# Patient Record
Sex: MALE | Race: WHITE
[De-identification: names, ages, dates, MRNs, and addresses within clinical notes are randomized per-mention and may not be internally consistent; named-entity substitution may affect disease eponyms.]

---

## 2017-03-14 ENCOUNTER — HOSPITAL ENCOUNTER (INPATIENT)
Dept: HOSPITAL 61 - ER | Age: 67
LOS: 2 days | Discharge: HOME | DRG: 291 | End: 2017-03-16
Attending: FAMILY MEDICINE | Admitting: FAMILY MEDICINE
Payer: COMMERCIAL

## 2017-03-14 VITALS — SYSTOLIC BLOOD PRESSURE: 129 MMHG | DIASTOLIC BLOOD PRESSURE: 54 MMHG

## 2017-03-14 VITALS — HEIGHT: 68 IN | WEIGHT: 230 LBS | BODY MASS INDEX: 34.86 KG/M2

## 2017-03-14 DIAGNOSIS — J45.901: ICD-10-CM

## 2017-03-14 DIAGNOSIS — J96.01: ICD-10-CM

## 2017-03-14 DIAGNOSIS — I42.9: ICD-10-CM

## 2017-03-14 DIAGNOSIS — E78.00: ICD-10-CM

## 2017-03-14 DIAGNOSIS — I11.0: ICD-10-CM

## 2017-03-14 DIAGNOSIS — E78.5: ICD-10-CM

## 2017-03-14 DIAGNOSIS — Z79.4: ICD-10-CM

## 2017-03-14 DIAGNOSIS — E11.9: ICD-10-CM

## 2017-03-14 DIAGNOSIS — I50.21: Primary | ICD-10-CM

## 2017-03-14 DIAGNOSIS — J44.1: ICD-10-CM

## 2017-03-14 LAB
ALBUMIN SERPL-MCNC: 3.9 G/DL (ref 3.4–5)
ALBUMIN/GLOB SERPL: 0.9 {RATIO} (ref 1–1.7)
ALP SERPL-CCNC: 89 U/L (ref 46–116)
ALT SERPL-CCNC: 56 U/L (ref 16–63)
ANION GAP SERPL CALC-SCNC: 7 MMOL/L (ref 6–14)
AST SERPL-CCNC: 38 U/L (ref 15–37)
BASOPHILS # BLD AUTO: 0 X10^3/UL (ref 0–0.2)
BASOPHILS NFR BLD: 0 % (ref 0–3)
BILIRUB SERPL-MCNC: 0.5 MG/DL (ref 0.2–1)
BUN SERPL-MCNC: 19 MG/DL (ref 8–26)
BUN/CREAT SERPL: 16 (ref 6–20)
CALCIUM SERPL-MCNC: 8.9 MG/DL (ref 8.5–10.1)
CHLORIDE SERPL-SCNC: 106 MMOL/L (ref 98–107)
CO2 SERPL-SCNC: 32 MMOL/L (ref 21–32)
CREAT SERPL-MCNC: 1.2 MG/DL (ref 0.7–1.3)
EOSINOPHIL NFR BLD: 3 % (ref 0–3)
ERYTHROCYTE [DISTWIDTH] IN BLOOD BY AUTOMATED COUNT: 15.3 % (ref 11.5–14.5)
GFR SERPLBLD BASED ON 1.73 SQ M-ARVRAT: 60.6 ML/MIN
GLOBULIN SER-MCNC: 4.5 G/DL (ref 2.2–3.8)
GLUCOSE SERPL-MCNC: 142 MG/DL (ref 70–99)
HCO3 BLDA-SCNC: 27 MMOL/L (ref 21–28)
HCT VFR BLD CALC: 49.5 % (ref 39–53)
HGB BLD-MCNC: 15.6 G/DL (ref 13–17.5)
INSPIRATION SETTING TIME VENT: 40
LYMPHOCYTES # BLD: 2.3 X10^3/UL (ref 1–4.8)
LYMPHOCYTES NFR BLD AUTO: 19 % (ref 24–48)
MCH RBC QN AUTO: 26 PG (ref 25–35)
MCHC RBC AUTO-ENTMCNC: 32 G/DL (ref 31–37)
MCV RBC AUTO: 84 FL (ref 79–100)
MONOCYTES NFR BLD: 8 % (ref 0–9)
NEUTROPHILS NFR BLD AUTO: 69 % (ref 31–73)
OBC FLU: (no result)
PCO2 BLDA: 48 MMHG (ref 35–46)
PH ABG: 7.36 (ref 7.35–7.45)
PLATELET # BLD AUTO: 218 X10^3/UL (ref 140–400)
PO2 BLDA: 135 MMHG (ref 65–108)
POTASSIUM SERPL-SCNC: 4.8 MMOL/L (ref 3.5–5.1)
PROT SERPL-MCNC: 8.4 G/DL (ref 6.4–8.2)
RBC # BLD AUTO: 5.91 X10^6/UL (ref 4.3–5.7)
SAO2 % BLDA: 98 % (ref 92–99)
SODIUM SERPL-SCNC: 145 MMOL/L (ref 136–145)
WBC # BLD AUTO: 12 X10^3/UL (ref 4–11)

## 2017-03-14 PROCEDURE — 80053 COMPREHEN METABOLIC PANEL: CPT

## 2017-03-14 PROCEDURE — 85007 BL SMEAR W/DIFF WBC COUNT: CPT

## 2017-03-14 PROCEDURE — 84484 ASSAY OF TROPONIN QUANT: CPT

## 2017-03-14 PROCEDURE — 96375 TX/PRO/DX INJ NEW DRUG ADDON: CPT

## 2017-03-14 PROCEDURE — 94660 CPAP INITIATION&MGMT: CPT

## 2017-03-14 PROCEDURE — 83880 ASSAY OF NATRIURETIC PEPTIDE: CPT

## 2017-03-14 PROCEDURE — 36415 COLL VENOUS BLD VENIPUNCTURE: CPT

## 2017-03-14 PROCEDURE — 87040 BLOOD CULTURE FOR BACTERIA: CPT

## 2017-03-14 PROCEDURE — 36600 WITHDRAWAL OF ARTERIAL BLOOD: CPT

## 2017-03-14 PROCEDURE — 94760 N-INVAS EAR/PLS OXIMETRY 1: CPT

## 2017-03-14 PROCEDURE — 5A09357 ASSISTANCE WITH RESPIRATORY VENTILATION, LESS THAN 24 CONSECUTIVE HOURS, CONTINUOUS POSITIVE AIRWAY PRESSURE: ICD-10-PCS | Performed by: FAMILY MEDICINE

## 2017-03-14 PROCEDURE — 80048 BASIC METABOLIC PNL TOTAL CA: CPT

## 2017-03-14 PROCEDURE — 87804 INFLUENZA ASSAY W/OPTIC: CPT

## 2017-03-14 PROCEDURE — 94640 AIRWAY INHALATION TREATMENT: CPT

## 2017-03-14 PROCEDURE — 85027 COMPLETE CBC AUTOMATED: CPT

## 2017-03-14 PROCEDURE — 93005 ELECTROCARDIOGRAM TRACING: CPT

## 2017-03-14 PROCEDURE — 82947 ASSAY GLUCOSE BLOOD QUANT: CPT

## 2017-03-14 PROCEDURE — 82805 BLOOD GASES W/O2 SATURATION: CPT

## 2017-03-14 PROCEDURE — 71010: CPT

## 2017-03-14 PROCEDURE — 93306 TTE W/DOPPLER COMPLETE: CPT

## 2017-03-14 PROCEDURE — 96374 THER/PROPH/DIAG INJ IV PUSH: CPT

## 2017-03-14 RX ADMIN — IPRATROPIUM BROMIDE AND ALBUTEROL SULFATE SCH ML: .5; 3 SOLUTION RESPIRATORY (INHALATION) at 21:16

## 2017-03-14 NOTE — ED.ADGEN
Past Medical History


Past Medical History:  Asthma, COPD, Diabetes-Type II, High Cholesterol, 

Hypertension


Past Surgical History:  Other


Additional Past Surgical Histo:  Right shoulder


Alcohol Use:  None


Drug Use:  None





Adult General


Chief Complaint


Chief Complaint:  SHORTNESS OF BREATH





HPI


HPI


Patient is a 66  year old man, history of COPD, type 2 diabetes mellitus, 

hypertension, who presents to the emergency department in respiratory failure 

via EMS. Per EMS report, patient with oxygen saturations in the 70s upon arrival

, patient was placed on a nonrebreather, and received a nebulizer treatment en 

route to the ED, with improvement of his oxygen saturation, although his work 

of breathing remains significantly increased. Patient states that he was seen 

his primary care provider's office this morning, and received a prescription 

for steroids. Oxygen saturation had been in the 90s at the time, he states that 

he has worsening symptoms that were triggered by the cold weather, and began 

feeling progressively worse throughout the afternoon into the evening. Patient 

is not previously required intubation or BiPAP usage. No cough or rhinorrhea. 

He denies any chest pain, any nausea or vomiting, any swelling extremities, 

history of DVT or PE, any recent travel or surgery, any weakness numbness or 

tingling, any drugs alcohol or cigarettes. Patient's oxygen saturation is ED 81

% on 10 L nasal cannula upon arrival to the emergency department when DuoNeb 

completed, transitioned to facemask and then to BiPAP emergently in the ED.





Review of Systems


Review of Systems


Constitutional:  Denies fever or chills. []


Eyes:  Denies change in visual acuity. []


HENT:  Denies nasal congestion or sore throat. [] 


Respiratory: Nonproductive cough, shortness of breath worsening over the past 

day.


Cardiovascular:  Denies chest pain or edema. [] 


GI:  Denies abdominal pain, nausea, vomiting, bloody stools or diarrhea. [] 


:  Denies dysuria. [] 


Musculoskeletal:  Denies back pain or joint pain. [] 


Integument:  Denies rash. [] 


Neurologic:  Denies headache, focal weakness or sensory changes. [] 


Endocrine:  Denies polyuria or polydipsia. [] 


Lymphatic:  Denies swollen glands. [] 


Psychiatric:  Denies depression or anxiety. []





Current Medications


Current Medications





 Current Medications








 Medications


  (Trade)  Dose


 Ordered  Sig/Chasity  Start Time


 Stop Time Status Last Admin


Dose Admin


 


 Albuterol/


 Ipratropium


  (Duoneb)  3 ml  1X  ONCE  3/14/17 18:15


 3/14/17 18:16 DC 3/14/17 18:13


3 ML


 


 Methylprednisolone


 Sodium Succinate


  (Solu-Medrol


 125mg Vial)  125 mg  1X  ONCE  3/14/17 18:15


 3/14/17 18:16 DC 3/14/17 18:23


125 MG











Allergies


Allergies





 Allergies








Coded Allergies Type Severity Reaction Last Updated Verified


 


  No Known Drug Allergies    10/3/14 No











Physical Exam


Physical Exam





Constitutional: Well developed, well nourished, in severe respiratory distress, 

with impending respiratory failure.


HENT: Normocephalic, atraumatic, bilateral external ears normal, oropharynx 

moist, no oral exudates, nose normal. []


Eyes: PERRLA, EOMI, conjunctiva normal, no discharge. [] 


Neck: Normal range of motion, no tenderness, supple, no stridor. [] 


Cardiovascular: Tachycardic, S1, S2, soft heart sounds, no rubs or gallops.


Lungs & Thorax: Patient with very poor air movement, a few mild expiratory 

wheezes identified. In pending respiratory failure.


Abdomen: Bowel sounds normal, soft, obese, no rebound, rigidity, no guarding no 

tenderness, no masses, no pulsatile masses. [] 


Skin: Warm, dry, no erythema, no rash. [] 


Back: No tenderness, no CVA tenderness. [] 


Extremities: No tenderness, no cyanosis, no clubbing, ROM intact, no edema. [

Negative Homans sign.] 


Neurologic: Alert and oriented X 3, normal motor function, normal sensory 

function, no focal deficits noted. []


Psychologic: Patient anxious, judgement normal, mood normal. []





Current Patient Data


Vital Signs





 Vital Signs








  Date Time  Temp Pulse Resp B/P Pulse Ox O2 Delivery O2 Flow Rate FiO2


 


3/14/17 19:09  78  162/71 100 BiPAP/CPAP  


 


3/14/17 17:53   26     








Lab Values





 Laboratory Tests








Test


  3/14/17


18:00 3/14/17


18:20


 


White Blood Count


  12.0x10^3/uL


(4.0-11.0)  H 


 


 


Red Blood Count


  5.91x10^6/uL


(4.30-5.70)  H 


 


 


Hemoglobin


  15.6g/dL


(13.0-17.5) 


 


 


Hematocrit


  49.5%


(39.0-53.0) 


 


 


Mean Corpuscular Volume 84fL ()   


 


Mean Corpuscular Hemoglobin 26pg (25-35)   


 


Mean Corpuscular Hemoglobin


Concent 32g/dL (31-37)


  


 


 


Red Cell Distribution Width


  15.3%


(11.5-14.5)  H 


 


 


Platelet Count


  218x10^3/uL


(140-400) 


 


 


Neutrophils (%) (Auto) 69% (31-73)   


 


Lymphocytes (%) (Auto) 19% (24-48)  L 


 


Monocytes (%) (Auto) 8% (0-9)   


 


Eosinophils (%) (Auto) 3% (0-3)   


 


Basophils (%) (Auto) 0% (0-3)   


 


Neutrophils # (Auto)


  8.4x10^3uL


(1.8-7.7)  H 


 


 


Lymphocytes # (Auto)


  2.3x10^3/uL


(1.0-4.8) 


 


 


Monocytes # (Auto)


  0.9x10^3/uL


(0.0-1.1) 


 


 


Eosinophils # (Auto)


  0.4x10^3/uL


(0.0-0.7) 


 


 


Basophils # (Auto)


  0.0x10^3/uL


(0.0-0.2) 


 


 


Sodium Level


  145mmol/L


(136-145) 


 


 


Potassium Level


  4.8mmol/L


(3.5-5.1) 


 


 


Chloride Level


  106mmol/L


() 


 


 


Carbon Dioxide Level


  32mmol/L


(21-32) 


 


 


Anion Gap 7 (6-14)   


 


Blood Urea Nitrogen


  19mg/dL (8-26)


  


 


 


Creatinine


  1.2mg/dL


(0.7-1.3) 


 


 


Estimated GFR


(Cockcroft-Gault) 60.6  


  


 


 


BUN/Creatinine Ratio 16 (6-20)   


 


Glucose Level


  142mg/dL


(70-99)  H 


 


 


Calcium Level


  8.9mg/dL


(8.5-10.1) 


 


 


Total Bilirubin


  0.5mg/dL


(0.2-1.0) 


 


 


Aspartate Amino Transferase


(AST) 38U/L (15-37)


H 


 


 


Alanine Aminotransferase (ALT) 56U/L (16-63)   


 


Alkaline Phosphatase


  89U/L ()


  


 


 


Troponin I Quantitative


  0.060ng/mL


(0.000-0.055) 


 


 


NT-Pro-B-Type Natriuretic


Peptide 1132pg/mL


(0-124)  H 


 


 


Total Protein


  8.4g/dL


(6.4-8.2)  H 


 


 


Albumin


  3.9g/dL


(3.4-5.0) 


 


 


Albumin/Globulin Ratio


  0.9 (1.0-1.7)


L 


 


 


Influenza Type A Antigen


  


  Negative


(NEGATIVE)


 


Influenza Type B Antigen


  


  Negative


(NEGATIVE)





 Laboratory Tests


3/14/17 18:00








 Laboratory Tests


3/14/17 18:00














EKG


EKG


EC: Sinus rhythm, heart rate 96 beats minute, ECG evaluation limited 

secondary to baseline artifact, patient with occasional PVCs noted, left axis 

deviation, with contour abnormality noted in the inferior and lateral leads, 

abnormal ECG, QTC of 459, CT of 200, QRS of 108, does not meet STEMI criteria. 

As interpreted by me. 





EC: Sinus rhythm, occasional APCs noted, with left axis deviation, 

contour normality is noted in the anterior septal and lateral leads, QTc of 464

, , QRS of 116, abnormal ECG, does not meet STEMI criteria. As 

interpreted by me.]





Radiology/Procedures


Radiology/Procedures


Chest x-ray: One view: Patient with evidence of cephalization and fluid in the 

lung fissure, consistent with congestive heart failure with pulmonary edema, 

patient with flattening of the diaphragms consistent with COPD. No infiltrates 

identified. No pneumothorax. No bony abnormalities identified. As interpreted 

by me. []





Course & Med Decision Making


Course & Med Decision Making


Pertinent Labs and Imaging studies reviewed. (See chart for details)





Patient's respiratory status improved on BiPAP, patient much more comfortable, 

breathing with the machine. Chest x-ray concerning for congestive heart failure

, COPD. Patient received DuoNeb's, steroids, and after discussion with patient, 

Lasix in the emergency department. Patient has no known history of congestive 

heart failure. Troponin also noted to be slightly elevated. ECG, laboratory and 

imaging studies along with history and examination reviewed with Dr. Eastman 

of cardiology, who recommends serial troponins, no indication for 

anticoagulation at this time, patient has already refused aspirin in the ED 

stating he has no chest pain, obtaining echocardiogram, as patient's 

presentation is consistent with a respiratory cause as opposed to an occlusive 

cardiac cause. We'll also repeat ECG in the morning. Did discuss this with 

patient, initially was declining to stay, however due to the critical nature of 

his illness, eventually agreed to stay, and to receive evaluation with 

pulmonary and cardiology, along with BiPAP and additional treatment. Patient 

tolerating BiPAP without issue as stated, oxygen saturations in the mid 90s, 

initial ABG reveals support is adequate. Findings as above discussed with Dr. Rodriguez, the patient's primary care provider, who confirms the patient's report 

that he was seen in the office this morning, and at that time had no difficulty 

during ambulatory trial, with adequate oxygen saturation and was discharged 

with prednisone. Patient was accepted to his service as a full admission to the 

cardiac telemetry floor, with consultation and continued evaluation as stated 

above, will also initiate antibiotics for additional coverage. Patient started 

on Levaquin without issue in the ED. Patient remained stable during his ED 

course with interventions as above, transfer to the floor without issue with 

bridge orders entered per discussion.





Dragon Disclaimer


Dragon Disclaimer


This electronic medical record was generated, in whole or in part, using a 

voice recognition dictation system.


Critical Care Time


 Critical care time was 25 minutes exclusive of procedures.





Departure


Impression:  


 Primary Impression:  


 Respiratory failure


 Additional Impressions:  


 CHF (congestive heart failure)


 COPD (chronic obstructive pulmonary disease)


Disposition:   ADMITTED AS INPATIENT


Admitting Physician:  MARIA ELENA Rodriguez


Condition:  IMPROVED





Problem Qualifiers








KARL KNUTSON DO Mar 14, 2017 22:01

## 2017-03-14 NOTE — ACF
Admission Forms Criteria


                                 RESPIRATORY FAILURE [facility]





Clinical Indications for Admission to Inpatient Care





                                                                    (Place 'X' 

for any and all applicable criteria):


                                                                          


Hospital admission is needed for appropriate care of the patient because of 

acute respiratory failure or insufficiency 


as indicated by ANY ONE of the following(1)(2)(3)(4)(5)(6)(7)(8):





[X]I.    Mechanical ventilation needed (acute invasive or noninvasive)


[ ]II.   Severe ventilation deficit as indicated by ANY ONE of the following (9)


        [ ]a)  Respiratory acidosis (pH less than 7.32 and partial pressure of 

carbon dioxide greater 


                than 40 mm Hg (5.3 kPa))


        [ ]b)  Partial pressure of carbon dioxide greater than 44 mm Hg (5.9 kPa

) (new)


        [ ]c)  Airflow measurements less than 25% of predicted (eg, peak 

expiratory flow rate 


                less than 100 L/minute)


        [ ]d)  Forced vital capacity less than 15 mL/kg of ideal body weight, 

or 50% decrease in 


                vital capacity from baseline


[ ]III.  Noncardiac pulmonary edema not resolving with rapid emergency 

treatment (8)


[ ]IV. Severe respiratory distress as indicated by ANY ONE of the following:


        [ ]a)  Severe tachypnea (respiratory rate greater than 30, greater than 

45 for 6-month-old, 


                greater than 60 for )


        [ ]b)  Severe hypoxemia (partial pressure of oxygen less than 50 mm Hg (

6.7 kPa) on greater


                than 50% oxygen or partial pressure of oxygen to FIO2 ratio 

less than 200)


        [ ]c)  Mental status deterioration from respiratory disease


[ ]V.  Airway obstruction or inadequate protection [A](10)(11) 











The original [employer] content created by [employer] has been revised. 


The portions of the content which have been revised are identified through the 

use of italic text or in bold, and [employer] 


has neither  reviewed nor approved the modified material. All other unmodified 

content is copyright  [employer].





Please see references footnoted in the original [employer] edition 

2016


Admission Criteria Met?:  Yes








TARSHA COLÓN Mar 14, 2017 23:33

## 2017-03-15 VITALS — SYSTOLIC BLOOD PRESSURE: 144 MMHG | DIASTOLIC BLOOD PRESSURE: 64 MMHG

## 2017-03-15 VITALS — SYSTOLIC BLOOD PRESSURE: 96 MMHG | DIASTOLIC BLOOD PRESSURE: 72 MMHG

## 2017-03-15 VITALS — DIASTOLIC BLOOD PRESSURE: 52 MMHG | SYSTOLIC BLOOD PRESSURE: 112 MMHG

## 2017-03-15 VITALS — SYSTOLIC BLOOD PRESSURE: 129 MMHG | DIASTOLIC BLOOD PRESSURE: 54 MMHG

## 2017-03-15 VITALS — SYSTOLIC BLOOD PRESSURE: 121 MMHG | DIASTOLIC BLOOD PRESSURE: 61 MMHG

## 2017-03-15 VITALS — DIASTOLIC BLOOD PRESSURE: 55 MMHG | SYSTOLIC BLOOD PRESSURE: 141 MMHG

## 2017-03-15 VITALS — SYSTOLIC BLOOD PRESSURE: 125 MMHG | DIASTOLIC BLOOD PRESSURE: 57 MMHG

## 2017-03-15 LAB
ANION GAP SERPL CALC-SCNC: 9 MMOL/L (ref 6–14)
BASOPHILS # BLD AUTO: 0 X10^3/UL (ref 0–0.2)
BASOPHILS NFR BLD: 0 % (ref 0–3)
BUN SERPL-MCNC: 23 MG/DL (ref 8–26)
CALCIUM SERPL-MCNC: 9 MG/DL (ref 8.5–10.1)
CHLORIDE SERPL-SCNC: 100 MMOL/L (ref 98–107)
CO2 SERPL-SCNC: 29 MMOL/L (ref 21–32)
CREAT SERPL-MCNC: 1.3 MG/DL (ref 0.7–1.3)
EOSINOPHIL NFR BLD: 0 % (ref 0–3)
ERYTHROCYTE [DISTWIDTH] IN BLOOD BY AUTOMATED COUNT: 15.2 % (ref 11.5–14.5)
GFR SERPLBLD BASED ON 1.73 SQ M-ARVRAT: 55.2 ML/MIN
GLUCOSE SERPL-MCNC: 271 MG/DL (ref 70–99)
HCT VFR BLD CALC: 46.5 % (ref 39–53)
HGB BLD-MCNC: 14.8 G/DL (ref 13–17.5)
LYMPHOCYTES # BLD: 0.6 X10^3/UL (ref 1–4.8)
LYMPHOCYTES NFR BLD AUTO: 5 % (ref 24–48)
MCH RBC QN AUTO: 26 PG (ref 25–35)
MCHC RBC AUTO-ENTMCNC: 32 G/DL (ref 31–37)
MCV RBC AUTO: 82 FL (ref 79–100)
MONOCYTES NFR BLD: 2 % (ref 0–9)
NEUTROPHILS NFR BLD AUTO: 93 % (ref 31–73)
PLATELET # BLD AUTO: 191 X10^3/UL (ref 140–400)
PLATELET # BLD EST: ADEQUATE 10*3/UL
POTASSIUM SERPL-SCNC: 5.3 MMOL/L (ref 3.5–5.1)
RBC # BLD AUTO: 5.68 X10^6/UL (ref 4.3–5.7)
SODIUM SERPL-SCNC: 138 MMOL/L (ref 136–145)
WBC # BLD AUTO: 12.8 X10^3/UL (ref 4–11)

## 2017-03-15 RX ADMIN — INSULIN ASPART SCH UNITS: 100 INJECTION, SOLUTION INTRAVENOUS; SUBCUTANEOUS at 17:40

## 2017-03-15 RX ADMIN — BUDESONIDE SCH MG: 0.5 SUSPENSION RESPIRATORY (INHALATION) at 19:26

## 2017-03-15 RX ADMIN — BUDESONIDE SCH MG: 0.5 SUSPENSION RESPIRATORY (INHALATION) at 07:34

## 2017-03-15 RX ADMIN — IPRATROPIUM BROMIDE AND ALBUTEROL SULFATE SCH ML: .5; 3 SOLUTION RESPIRATORY (INHALATION) at 12:18

## 2017-03-15 RX ADMIN — IPRATROPIUM BROMIDE AND ALBUTEROL SULFATE SCH ML: .5; 3 SOLUTION RESPIRATORY (INHALATION) at 19:26

## 2017-03-15 RX ADMIN — INSULIN ASPART SCH UNITS: 100 INJECTION, SOLUTION INTRAVENOUS; SUBCUTANEOUS at 09:18

## 2017-03-15 RX ADMIN — IPRATROPIUM BROMIDE AND ALBUTEROL SULFATE SCH ML: .5; 3 SOLUTION RESPIRATORY (INHALATION) at 15:12

## 2017-03-15 RX ADMIN — LOSARTAN POTASSIUM SCH MG: 50 TABLET, FILM COATED ORAL at 09:00

## 2017-03-15 RX ADMIN — IPRATROPIUM BROMIDE AND ALBUTEROL SULFATE SCH ML: .5; 3 SOLUTION RESPIRATORY (INHALATION) at 07:34

## 2017-03-15 RX ADMIN — INSULIN ASPART SCH UNITS: 100 INJECTION, SOLUTION INTRAVENOUS; SUBCUTANEOUS at 17:00

## 2017-03-15 RX ADMIN — INSULIN DETEMIR SCH UNITS: 100 INJECTION, SOLUTION SUBCUTANEOUS at 21:57

## 2017-03-15 RX ADMIN — INSULIN ASPART SCH UNITS: 100 INJECTION, SOLUTION INTRAVENOUS; SUBCUTANEOUS at 13:58

## 2017-03-15 RX ADMIN — INSULIN ASPART SCH UNITS: 100 INJECTION, SOLUTION INTRAVENOUS; SUBCUTANEOUS at 12:00

## 2017-03-15 RX ADMIN — INSULIN DETEMIR SCH UNITS: 100 INJECTION, SOLUTION SUBCUTANEOUS at 00:40

## 2017-03-15 NOTE — PDOC
PULMONARY PROGRESS NOTES


Vitals





 Vital Signs








  Date Time  Temp Pulse Resp B/P Pulse Ox O2 Delivery O2 Flow Rate FiO2


 


3/15/17 09:00  87  144/64    


 


3/15/17 07:40      Room Air  


 


3/15/17 07:35     96   


 


3/15/17 07:00 97.6  20     





 97.6       


 


3/15/17 04:19       14.0 








Labs





Laboratory Tests








Test


  3/14/17


18:00 3/14/17


18:20 3/14/17


20:30 3/14/17


22:05


 


White Blood Count


  12.0x10^3/uL


(4.0-11.0) 


  


  


 


 


Red Blood Count


  5.91x10^6/uL


(4.30-5.70) 


  


  


 


 


Hemoglobin


  15.6g/dL


(13.0-17.5) 


  


  


 


 


Hematocrit


  49.5%


(39.0-53.0) 


  


  


 


 


Mean Corpuscular Volume 84fL ()    


 


Mean Corpuscular Hemoglobin 26pg (25-35)    


 


Mean Corpuscular Hemoglobin


Concent 32g/dL (31-37) 


  


  


  


 


 


Red Cell Distribution Width


  15.3%


(11.5-14.5) 


  


  


 


 


Platelet Count


  218x10^3/uL


(140-400) 


  


  


 


 


Neutrophils (%) (Auto) 69% (31-73)    


 


Lymphocytes (%) (Auto) 19% (24-48)    


 


Monocytes (%) (Auto) 8% (0-9)    


 


Eosinophils (%) (Auto) 3% (0-3)    


 


Basophils (%) (Auto) 0% (0-3)    


 


Neutrophils # (Auto)


  8.4x10^3uL


(1.8-7.7) 


  


  


 


 


Lymphocytes # (Auto)


  2.3x10^3/uL


(1.0-4.8) 


  


  


 


 


Monocytes # (Auto)


  0.9x10^3/uL


(0.0-1.1) 


  


  


 


 


Eosinophils # (Auto)


  0.4x10^3/uL


(0.0-0.7) 


  


  


 


 


Basophils # (Auto)


  0.0x10^3/uL


(0.0-0.2) 


  


  


 


 


Sodium Level


  145mmol/L


(136-145) 


  


  


 


 


Potassium Level


  4.8mmol/L


(3.5-5.1) 


  


  


 


 


Chloride Level


  106mmol/L


() 


  


  


 


 


Carbon Dioxide Level


  32mmol/L


(21-32) 


  


  


 


 


Anion Gap 7 (6-14)    


 


Blood Urea Nitrogen 19mg/dL (8-26)    


 


Creatinine


  1.2mg/dL


(0.7-1.3) 


  


  


 


 


Estimated GFR


(Cockcroft-Gault) 60.6 


  


  


  


 


 


BUN/Creatinine Ratio 16 (6-20)    


 


Glucose Level


  142mg/dL


(70-99) 


  


  


 


 


Calcium Level


  8.9mg/dL


(8.5-10.1) 


  


  


 


 


Total Bilirubin


  0.5mg/dL


(0.2-1.0) 


  


  


 


 


Aspartate Amino Transf


(AST/SGOT) 38U/L (15-37) 


  


  


  


 


 


Alanine Aminotransferase


(ALT/SGPT) 56U/L (16-63) 


  


  


  


 


 


Alkaline Phosphatase 89U/L ()    


 


Troponin I Quantitative


  0.060ng/mL


(0.000-0.055) 


  


  


 


 


NT-Pro-B-Type Natriuretic


Peptide 1132pg/mL


(0-124) 


  


  


 


 


Total Protein


  8.4g/dL


(6.4-8.2) 


  


  


 


 


Albumin


  3.9g/dL


(3.4-5.0) 


  


  


 


 


Albumin/Globulin Ratio 0.9 (1.0-1.7)    


 


Influenza Type A Antigen


  


  Negative


(NEGATIVE) 


  


 


 


Influenza Type B Antigen


  


  Negative


(NEGATIVE) 


  


 


 


O2 Saturation   98% (92-99)  


 


Arterial Blood pH


  


  


  7.36


(7.35-7.45) 


 


 


Arterial Blood pCO2 at


Patient Temp 


  


  48mmHg (35-46) 


  


 


 


Arterial Blood pO2 at Patient


Temp 


  


  135mmHg


() 


 


 


Arterial Blood HCO3


  


  


  27mmol/L


(21-28) 


 


 


Arterial Blood Base Excess   1mmol/L (-3-3)  


 


FiO2   40.0  


 


Glucose (Fingerstick)


  


  


  


  210mg/dL


(70-99)














Test


  3/15/17


00:15 3/15/17


06:20 3/15/17


07:55 


 


 


Troponin I Quantitative


  0.538ng/mL


(0.000-0.055) 0.482ng/mL


(0.000-0.055) 


  


 


 


White Blood Count


  


  12.8x10^3/uL


(4.0-11.0) 


  


 


 


Red Blood Count


  


  5.68x10^6/uL


(4.30-5.70) 


  


 


 


Hemoglobin


  


  14.8g/dL


(13.0-17.5) 


  


 


 


Hematocrit


  


  46.5%


(39.0-53.0) 


  


 


 


Mean Corpuscular Volume  82fL ()   


 


Mean Corpuscular Hemoglobin  26pg (25-35)   


 


Mean Corpuscular Hemoglobin


Concent 


  32g/dL (31-37) 


  


  


 


 


Red Cell Distribution Width


  


  15.2%


(11.5-14.5) 


  


 


 


Platelet Count


  


  191x10^3/uL


(140-400) 


  


 


 


Neutrophils (%) (Auto)  93% (31-73)   


 


Lymphocytes (%) (Auto)  5% (24-48)   


 


Monocytes (%) (Auto)  2% (0-9)   


 


Eosinophils (%) (Auto)  0% (0-3)   


 


Basophils (%) (Auto)  0% (0-3)   


 


Neutrophils # (Auto)


  


  11.9x10^3uL


(1.8-7.7) 


  


 


 


Lymphocytes # (Auto)


  


  0.6x10^3/uL


(1.0-4.8) 


  


 


 


Monocytes # (Auto)


  


  0.3x10^3/uL


(0.0-1.1) 


  


 


 


Eosinophils # (Auto)


  


  0.0x10^3/uL


(0.0-0.7) 


  


 


 


Basophils # (Auto)


  


  0.0x10^3/uL


(0.0-0.2) 


  


 


 


Sodium Level


  


  138mmol/L


(136-145) 


  


 


 


Potassium Level


  


  5.3mmol/L


(3.5-5.1) 


  


 


 


Chloride Level


  


  100mmol/L


() 


  


 


 


Carbon Dioxide Level


  


  29mmol/L


(21-32) 


  


 


 


Anion Gap  9 (6-14)   


 


Blood Urea Nitrogen  23mg/dL (8-26)   


 


Creatinine


  


  1.3mg/dL


(0.7-1.3) 


  


 


 


Estimated GFR


(Cockcroft-Gault) 


  55.2 


  


  


 


 


Glucose Level


  


  271mg/dL


(70-99) 


  


 


 


Calcium Level


  


  9.0mg/dL


(8.5-10.1) 


  


 


 


Glucose (Fingerstick)


  


  


  277mg/dL


(70-99) 


 








Laboratory Tests








Test


  3/14/17


18:00 3/14/17


18:20 3/14/17


20:30 3/14/17


22:05


 


White Blood Count


  12.0x10^3/uL


(4.0-11.0) 


  


  


 


 


Red Blood Count


  5.91x10^6/uL


(4.30-5.70) 


  


  


 


 


Hemoglobin


  15.6g/dL


(13.0-17.5) 


  


  


 


 


Hematocrit


  49.5%


(39.0-53.0) 


  


  


 


 


Mean Corpuscular Volume 84fL ()    


 


Mean Corpuscular Hemoglobin 26pg (25-35)    


 


Mean Corpuscular Hemoglobin


Concent 32g/dL (31-37) 


  


  


  


 


 


Red Cell Distribution Width


  15.3%


(11.5-14.5) 


  


  


 


 


Platelet Count


  218x10^3/uL


(140-400) 


  


  


 


 


Neutrophils (%) (Auto) 69% (31-73)    


 


Lymphocytes (%) (Auto) 19% (24-48)    


 


Monocytes (%) (Auto) 8% (0-9)    


 


Eosinophils (%) (Auto) 3% (0-3)    


 


Basophils (%) (Auto) 0% (0-3)    


 


Neutrophils # (Auto)


  8.4x10^3uL


(1.8-7.7) 


  


  


 


 


Lymphocytes # (Auto)


  2.3x10^3/uL


(1.0-4.8) 


  


  


 


 


Monocytes # (Auto)


  0.9x10^3/uL


(0.0-1.1) 


  


  


 


 


Eosinophils # (Auto)


  0.4x10^3/uL


(0.0-0.7) 


  


  


 


 


Basophils # (Auto)


  0.0x10^3/uL


(0.0-0.2) 


  


  


 


 


Sodium Level


  145mmol/L


(136-145) 


  


  


 


 


Potassium Level


  4.8mmol/L


(3.5-5.1) 


  


  


 


 


Chloride Level


  106mmol/L


() 


  


  


 


 


Carbon Dioxide Level


  32mmol/L


(21-32) 


  


  


 


 


Anion Gap 7 (6-14)    


 


Blood Urea Nitrogen 19mg/dL (8-26)    


 


Creatinine


  1.2mg/dL


(0.7-1.3) 


  


  


 


 


Estimated GFR


(Cockcroft-Gault) 60.6 


  


  


  


 


 


BUN/Creatinine Ratio 16 (6-20)    


 


Glucose Level


  142mg/dL


(70-99) 


  


  


 


 


Calcium Level


  8.9mg/dL


(8.5-10.1) 


  


  


 


 


Total Bilirubin


  0.5mg/dL


(0.2-1.0) 


  


  


 


 


Aspartate Amino Transf


(AST/SGOT) 38U/L (15-37) 


  


  


  


 


 


Alanine Aminotransferase


(ALT/SGPT) 56U/L (16-63) 


  


  


  


 


 


Alkaline Phosphatase 89U/L ()    


 


Troponin I Quantitative


  0.060ng/mL


(0.000-0.055) 


  


  


 


 


NT-Pro-B-Type Natriuretic


Peptide 1132pg/mL


(0-124) 


  


  


 


 


Total Protein


  8.4g/dL


(6.4-8.2) 


  


  


 


 


Albumin


  3.9g/dL


(3.4-5.0) 


  


  


 


 


Albumin/Globulin Ratio 0.9 (1.0-1.7)    


 


Influenza Type A Antigen


  


  Negative


(NEGATIVE) 


  


 


 


Influenza Type B Antigen


  


  Negative


(NEGATIVE) 


  


 


 


O2 Saturation   98% (92-99)  


 


Arterial Blood pH


  


  


  7.36


(7.35-7.45) 


 


 


Arterial Blood pCO2 at


Patient Temp 


  


  48mmHg (35-46) 


  


 


 


Arterial Blood pO2 at Patient


Temp 


  


  135mmHg


() 


 


 


Arterial Blood HCO3


  


  


  27mmol/L


(21-28) 


 


 


Arterial Blood Base Excess   1mmol/L (-3-3)  


 


FiO2   40.0  


 


Glucose (Fingerstick)


  


  


  


  210mg/dL


(70-99)














Test


  3/15/17


00:15 3/15/17


06:20 3/15/17


07:55 


 


 


Troponin I Quantitative


  0.538ng/mL


(0.000-0.055) 0.482ng/mL


(0.000-0.055) 


  


 


 


White Blood Count


  


  12.8x10^3/uL


(4.0-11.0) 


  


 


 


Red Blood Count


  


  5.68x10^6/uL


(4.30-5.70) 


  


 


 


Hemoglobin


  


  14.8g/dL


(13.0-17.5) 


  


 


 


Hematocrit


  


  46.5%


(39.0-53.0) 


  


 


 


Mean Corpuscular Volume  82fL ()   


 


Mean Corpuscular Hemoglobin  26pg (25-35)   


 


Mean Corpuscular Hemoglobin


Concent 


  32g/dL (31-37) 


  


  


 


 


Red Cell Distribution Width


  


  15.2%


(11.5-14.5) 


  


 


 


Platelet Count


  


  191x10^3/uL


(140-400) 


  


 


 


Neutrophils (%) (Auto)  93% (31-73)   


 


Lymphocytes (%) (Auto)  5% (24-48)   


 


Monocytes (%) (Auto)  2% (0-9)   


 


Eosinophils (%) (Auto)  0% (0-3)   


 


Basophils (%) (Auto)  0% (0-3)   


 


Neutrophils # (Auto)


  


  11.9x10^3uL


(1.8-7.7) 


  


 


 


Lymphocytes # (Auto)


  


  0.6x10^3/uL


(1.0-4.8) 


  


 


 


Monocytes # (Auto)


  


  0.3x10^3/uL


(0.0-1.1) 


  


 


 


Eosinophils # (Auto)


  


  0.0x10^3/uL


(0.0-0.7) 


  


 


 


Basophils # (Auto)


  


  0.0x10^3/uL


(0.0-0.2) 


  


 


 


Sodium Level


  


  138mmol/L


(136-145) 


  


 


 


Potassium Level


  


  5.3mmol/L


(3.5-5.1) 


  


 


 


Chloride Level


  


  100mmol/L


() 


  


 


 


Carbon Dioxide Level


  


  29mmol/L


(21-32) 


  


 


 


Anion Gap  9 (6-14)   


 


Blood Urea Nitrogen  23mg/dL (8-26)   


 


Creatinine


  


  1.3mg/dL


(0.7-1.3) 


  


 


 


Estimated GFR


(Cockcroft-Gault) 


  55.2 


  


  


 


 


Glucose Level


  


  271mg/dL


(70-99) 


  


 


 


Calcium Level


  


  9.0mg/dL


(8.5-10.1) 


  


 


 


Glucose (Fingerstick)


  


  


  277mg/dL


(70-99) 


 








Medications





Active Scripts








 Medications  Dose


 Route/Sig Days Date Category


 


 Stiolto Respimat


 Inhal Spray


  (Tiotropium


 Br/Olodaterol


 HCl) 4 Gm


 Mist.inhal  4 Gm


 IH DAILY   3/14/17 Reported


 


 Novolog Flexpen


  (Insulin Aspart)


 100 Unit/1 Ml


 Insuln.pen  1 Unit


 SQ   3/14/17 Reported


 


 Ventolin Hfa


 Inhaler


  (Albuterol


 Sulfate) 18 Gm


 Hfa.aer.ad  2 Puff


 INH QID   3/14/17 Reported


 


 Flovent 110MCG


 Hfa (Fluticasone


 Propionate) 12 Gm


 Aer.w.adap  2 Puff


 IH BID   3/14/17 Reported


 


 Losartan


 Potassium 25 Mg


 Tablet  100 Mg


 PO DAILY   3/14/17 Reported


 


 Levemir (Insulin


 Detemir) 100


 Unit/1 Ml Vial  70 Unit


 SQ QHS   3/14/17 Reported











Impression


.


FULL CONSULT DICTATED


I THINK THIS IS NEW ONSET CHF NOT SO MUCH IS ASTHMA


WILL AWAIT ECHO REPORT


THANKS


D/W DR LEPE AND WIFE








BRITANY VALADEZ MD Mar 15, 2017 10:58

## 2017-03-15 NOTE — CARD
--------------- APPROVED REPORT --------------





EXAM: Two-dimensional and M-mode echocardiogram with Doppler and color Doppler.



Other Information 

Quality : AverageHR: 83bpm

Rhythm : Atrial Fibrillation



INDICATION

Congestive Heart Failure 

Respiratory Failure



2D DIMENSIONS 

RVDd3.5 (2.9-3.5cm)Left Atrium(2D)4.0 (1.6-4.0cm)

IVSd1.2 (0.7-1.1cm)Aortic Root(2D)2.8 (2.0-3.7cm)

LVDd5.0 (3.9-5.9cm)LVOT Diameter2.1 (1.8-2.4cm)

PWd1.0 (0.7-1.1cm)LVDs3.7 (2.5-4.0cm)

FS (%) 25.3 %SV58.4 ml

LVEF(%)45.0 (>50%)



Aortic Valve

AoV Peak Gunnar.96.7cm/sAoV VTI19.2cm

AO Peak GR.3.7mmHgLVOT Peak Gunnar.100.5cm/s

LVOT  VTI 17.72cmAO Mean GR.2mmHg

MARCIE (VMAX)3.27op8JNQ   (VTI)3.30cm2



Pulmonary Valve

PV Peak Asglsfmy446.3cm/sPV Peak Grad.5mmHg

RVOT VTI13.0cm



Tricuspid Valve

TR P. Upwwaynj430hv/sRAP [id number]

TR Peak Gr.4eiGrOJZP58naWp



 LEFT VENTRICLE 

The left ventricle is normal size. There is mild concentric left ventricular hypertrophy. Left ventri
richy systolic function is mildly impaired The Ejection Fraction is 45%. There is normal LV segmental w
all motion. Unable to assess due to abnormal heart rhythm. There is no ventricular septal defect visu
alized.



 RIGHT VENTRICLE 

The right ventricle is mildly dilated The right ventricular systolic function is mildly impaired



 ATRIA 

The left atrium size is normal. The right atrium size is normal. The interatrial septum is intact wit
h no evidence for an atrial septal defect or patent foramen ovale as noted on 2-D or Doppler imaging.




 AORTIC VALVE 

The aortic valve is trileaflet. The aortic valve is mildly thickened. Doppler and Color Flow revealed
 no significant aortic regurgitation. There is no significant aortic valvular stenosis.



 MITRAL VALVE 

The mitral valve leaflets are thickened. There is no evidence of mitral valve prolapse. There is no m
itral valve stenosis. Doppler and Color Flow revealed no mitral valve regurgitation noted.



 TRICUSPID VALVE 

The tricuspid valve is normal in structure. Doppler and Color Flow revealed trace tricuspid regurgita
tion. Unable to accurately estimate the PA pressure There is no tricuspid valve stenosis.



 PULMONIC VALVE 

The pulmonary valve is normal in structure and function. Doppler and Color Flow revealed no pulmonic 
valvular regurgitation. There is no pulmonic valvular stenosis.



 GREAT VESSELS 

The aortic root is normal in size. The ascending aorta is normal in size. The IVC is normal in size a
nd collapses >50% with inspiration.



 PERICARDIAL EFFUSION 

There is no pleural effusion. There is no evidence of significant pericardial effusion.



Critical Notification

Critical Value: No



<Conclusion>

Left ventricle systolic function is mildly impaired

The Ejection Fraction is 45%.

The right ventricular systolic function is mildly impaired

The left atrium size is normal.

The right atrium size is normal.

The aortic valve is trileaflet.

The aortic valve is mildly thickened.

The mitral valve leaflets are thickened.

Doppler and Color Flow revealed no mitral valve regurgitation noted.

Doppler and Color Flow revealed trace tricuspid regurgitation.

Unable to accurately estimate the PA pressure

The pulmonary valve is normal in structure and function.

There is no evidence of significant pericardial effusion.

## 2017-03-15 NOTE — RAD
Indication: Dyspnea today.



Technique: Upright portable chest radiograph was obtained. Comparison is from

May 15, 2015.



Findings: There is streaky opacity in the left upper and lower lung fields as

well as the right lower lung field. This is a change from prior. There is no

pleural effusion. The heart is not enlarged. There are degenerative changes in

the spine. Leads overlie the patient.



Impression: New bilateral infiltrates may represent multifocal pneumonia or

mild CHF.

## 2017-03-15 NOTE — HP
ADMIT DATE:  03/14/2017



ADMISSION DIAGNOSIS:  Shortness of breath.



HISTORY OF PRESENT ILLNESS:  This is a 66-year-old white male with longstanding

diabetes, who was in the office the morning of admission with symptoms of a mild

COPD exacerbation or asthma exacerbation.  He was not actively wheezing at the

time, but was requesting prednisone.  Since he retired, he has not really had

any significant exacerbations.  The cold weather seems to brought it on.  He has

also been exposed to some chemicals at home.  He had a 6-minute walk done in the

office, which showed he desaturated to the mid 80s.  He was placed on 3 liters

nasal cannula oxygen and corrected back to the upper 90s.  Home oxygen was

ordered for him.  He had blood drawn prior to leaving the office, but once he

walked in the cold there to the car, he needed 3 puffs on his inhaler, did have

some bronchospasm type symptoms.  He then went home and received his oxygen and

had a portable bottle.  He went to pick his wife up from work.  The perfume on

her jacket seemed to irritate his breathing.  On the way home from that, he

stopped to get gas for the car.  The cold air and gas fumes again seemed to

worsen his respiratory status and at that point he was unable to catch his

breath.  As they were driving by the fire station on the way home, he stopped

and EMT jacked him and transported him by ambulance to the Emergency Room.  At

that point, he felt rather panicky and felt like his heart was racing.  He was

denying chest pain.  His oxygen saturations were only in the 70s upon arrival

and he was placed on a nonrebreather and en route to the ED, he received a neb

treatment.  He had not started his steroid prescription that he was given at his

office appointment.  He has not had any recent travel.  He has not had any

recent swelling in his legs.  He has not had any history of pulmonary embolism. 

He has not had any prior history of heart disease.  He does not smoke and has

not been around secondhand smoke.



PAST MEDICAL HISTORY:  Asthma, COPD, type 2 diabetes, hypertension,

hyperlipidemia.



PAST SURGICAL HISTORY:  Significant for right shoulder.



FAMILY HISTORY:  Noncontributory.



SOCIAL HISTORY:  No tobacco or alcohol.  He is .



ALLERGIES:  He has no known drug allergies.



HOME MEDICATIONS:  Include albuterol inhaler p.r.n., Flovent 110 mcg 2 puffs

b.i.d., NovoLog 20-30 units t.i.d. with meals, Levemir 70 units at bedtime,

losartan 100 mg daily and Stiolto Respimat 1 inhalation b.i.d.



PHYSICAL EXAMINATION:

VITAL SIGNS:  He is afebrile, blood pressures intermittently elevated,

respiratory rate is normal and his oxygen saturations were normal on room air

after spending the night on BiPAP.

GENERAL:  He did not sleep well last night because of the BiPAP.

HEENT:  He is wearing his glasses, but otherwise unremarkable.  No sinus

congestion, pain or tenderness.  Mucous membranes are moist.  No thrush.

NECK:  Supple.

HEART:  Regular rate and rhythm.  No murmurs heard.  Normal S1 and S2.

LUNGS:  Clear to auscultation currently.

ABDOMEN:  Nondistended, nontender without hepatosplenomegaly or other masses.

MUSCULOSKELETAL:  His joints are nontender.

EXTREMITIES:  There is no clubbing, cyanosis or peripheral edema.

SKIN:  Not showing significant bruising or other lesions.

NEUROLOGIC:  He is intact.  He is alert and oriented.  His mood is normal. 

Affect is normal.



LABORATORY DATA:  Potassium is slightly high at 5.3, BUN is 23, creatinine 1.3

with an EGFR of 55, glucose has generally been in the 250 range.  His initial

troponin was elevated at ____ 0.06, bumped up to 0.53, but coming down to 0.482.

 Chest x-ray shows some borderline heart enlargement with bilateral infiltrates

suggesting pneumonia versus heart failure.  His proBNP was elevated at 1132. 

Serology tests were negative for flu influenza A and B.  Echocardiogram has been

done, but not yet read.



ASSESSMENT:

1.  New-onset congestive heart failure, suspect underlying diabetic

cardiomyopathy with demand ischemia causing his elevation in enzymes.

2.  Chronic obstructive pulmonary disease/asthma with acute exacerbation.

3.  Exertional hypoxemia.

4.  Longstanding type 2 diabetes, on insulin.

5.  Hypertension.

6.  Hyperlipidemia.



PLAN:  He is admitted.  Pulmonary and cardiac consultations have been obtained,

awaiting echo.  Steroids have increased his blood sugar.  Sliding scale insulin

is started in addition to his routine doses.  Additional recommendations per

specialists.

 



______________________________

W MARGARET LEPE MD



DR:  DONALD/luan  JOB#:  [id number] / [id number]

DD:  03/15/2017 18:35  DT:  03/15/2017 21:36

## 2017-03-15 NOTE — EKG
[facility]

               [address] [place] [address]

Test Date:    2017               Test Time:    19:04:10

Pat Name:     ANISA TYLER              Department:   

Patient ID:   [id number]           Room:         207 1

Gender:       M                        Technician:   

:          1950               Requested By: BOLA LEPE

Order Number: [id number]            Reading MD:   Elva Montemayor

                                 Measurements

Intervals                              Axis          

Rate:         79                       P:            90

AZ:           194                      QRS:          -62

QRSD:         116                      T:            92

QT:           404                                    

QTc:          464                                    

                           Interpretive Statements

SINUS RHYTHM

ATRIAL PREMATURE COMPLEX(ES)

ABNORMAL LEFT AXIS DEVIATION

LEFT ANTERIOR FASCICULAR BLOCK

QRS(T) CONTOUR ABNORMALITY

CONSISTENT WITH ANTEROSEPTAL INFARCT

PROBABLY OLD

T ABNORMALITY IN HIGH LATERAL LEADS





Electronically Signed On 3- 21:39:49 CDT by Elva Montemayor

## 2017-03-15 NOTE — EKG
[facility]

               [address] [place] [address]

Test Date:    2017               Test Time:    18:14:58

Pat Name:     ANISA TYLER              Department:   

Patient ID:   [id number]           Room:         207 1

Gender:       M                        Technician:   

:          1950               Requested By: KARL KNUTSON

Order Number: [id number]            Reading MD:   Elva Montemayor

                                 Measurements

Intervals                              Axis          

Rate:         96                       P:            90

NE:           200                      QRS:          -64

QRSD:         108                      T:            97

QT:           358                                    

QTc:          459                                    

                           Interpretive Statements

SINUS RHYTHM.

OLD ANTEROSEPTAL WALL mi.

PREMATURE VENTRICULAR CONTRACTIONS.

MISSING  LEAD V 4.

Electronically Signed On 3- 21:39:05 CDT by Elva Montemayor

## 2017-03-15 NOTE — PDOC2
CONSULT


Date of Consult


Date of Consult


DATE: 3/15/17 


TIME: 10:30





Reason for Consult


Reason for Consult:


Elevated troponins





Referring Physician


Referring Physician:


Dr. Rodriguez





Identification/Chief Complaint


Chief Complaint


SOB





History of Present Illness


Reason for Visit:


Pt presents to ED with severe SOB. He reports this began with the changing 

weather and worsened to the point of needing to come to the ED. He saw his PCP 

before coming to the hospital and was given a prescription for Prednisone but 

was not able to fill it. He denies ever feeling any chest pain or palpitations. 

Reports SOB has greatly improved





Current Problem List


Problem List


 Problems


Medical Problems:


(1) CHF (congestive heart failure)


Status: Acute  





(2) COPD (chronic obstructive pulmonary disease)


Status: Acute  





(3) Respiratory failure


Status: Acute  











Current Medications


Current Medications





 Current Medications


Albuterol/ Ipratropium (Duoneb) 3 ml 1X  ONCE NEB  Last administered on 3/14/

17at 18:13;  Start 3/14/17 at 18:15;  Stop 3/14/17 at 18:16;  Status DC


Methylprednisolone Sodium Succinate (Solu-Medrol 125mg Vial) 125 mg 1X  ONCE IV

  Last administered on 3/14/17at 18:23;  Start 3/14/17 at 18:15;  Stop 3/14/17 

at 18:16;  Status DC


Fentanyl Citrate (Fentanyl 2ml Vial) 25 mcg PRN Q15MIN  PRN IV PAIN GREATER 

THAN 3/10;  Start 3/14/17 at 19:15;  Stop 3/15/17 at 19:14


Nitroglycerin (Nitrostat) 0.4 mg PRN Q5MIN  PRN SL CHEST PAIN;  Start 3/14/17 

at 19:15


Furosemide (Lasix) 20 mg 1X  ONCE IVP  Last administered on 3/14/17at 19:27;  

Start 3/14/17 at 19:15;  Stop 3/14/17 at 19:16;  Status DC


Aspirin (Reggie Aspirin) 325 mg 1X  ONCE PO ;  Start 3/14/17 at 19:15;  Stop 3/14

/17 at 19:16;  Status DC


Ondansetron HCl (Zofran) 4 mg PRN Q8HRS  PRN IV NAUSEA/VOMITING;  Start 3/14/17 

at 20:30;  Stop 3/15/17 at 20:29


Morphine Sulfate 4 mg PRN Q2HR  PRN IV PAIN;  Start 3/14/17 at 20:30;  Stop 3/15

/17 at 20:29


Acetaminophen (Tylenol) 650 mg PRN Q4HRS  PRN PO FEVER;  Start 3/14/17 at 20:30

;  Stop 3/15/17 at 20:29


Nitroglycerin (Nitrostat) 0.4 mg PRN Q5MIN  PRN SL CHEST PAIN;  Start 3/14/17 

at 20:30;  Stop 3/14/17 at 20:31;  Status DC


Albuterol/ Ipratropium (Duoneb) 3 ml RTQID NEB  Last administered on 3/15/17at 

07:34;  Start 3/14/17 at 20:30;  Stop 3/16/17 at 20:29


Insulin Aspart (Novolog) 0-5 UNITS TIDWMEALS SQ  Last administered on 3/15/17at 

09:18;  Start 3/15/17 at 08:00


Dextrose 12.5 gm PRN Q15MIN  PRN IV SEE COMMENTS;  Start 3/14/17 at 20:30


Levofloxacin/ Dextrose 1 each 1 each PRN DAILY  PRN MC SEE COMMENTS;  Start 3/14

/17 at 20:45


Levofloxacin/ Dextrose (LEVAQUIN 750mg PREMIX) 150 ml @  100 mls/hr Q24H IV  

Last administered on 3/15/17at 00:08;  Start 3/14/17 at 21:00


Losartan Potassium (Cozaar) 100 mg DAILY PO ;  Start 3/15/17 at 09:00


Non-Formulary Medication 2 puff QID INH FOR ASTHMA;  Start 3/15/17 at 09:00;  

Status UNV


Non-Formulary Medication 2 puff BID IH ;  Start 3/15/17 at 09:00;  Status UNV


Insulin Detemir (Levemir) 70 units QHS SQ  Last administered on 3/15/17at 00:40

;  Start 3/15/17 at 00:30


Non-Formulary Medication 4 gm DAILY IH ;  Start 3/15/17 at 09:00;  Status UNV


Budesonide (Pulmicort) 0.5 mg RTBID NEB  Last administered on 3/15/17at 07:34;  

Start 3/15/17 at 08:00


Insulin Aspart (Novolog) 30 units TIDAC SQ ;  Start 3/15/17 at 11:30


Docusate Sodium (Colace) 100 mg PRN DAILY  PRN PO CONSTIPATION;  Start 3/15/17 

at 10:00





Active Scripts


Active


Reported


Stiolto Respimat Inhal Spray (Tiotropium Br/Olodaterol HCl) 4 Gm Mist.inhal 4 

Gm IH DAILY


Novolog Flexpen (Insulin Aspart) 100 Unit/1 Ml Insuln.pen 1 Unit SQ 


Ventolin Hfa Inhaler (Albuterol Sulfate) 18 Gm Hfa.aer.ad 2 Puff INH QID


Flovent 110MCG Hfa (Fluticasone Propionate) 12 Gm Aer.w.adap 2 Puff IH BID


Losartan Potassium 25 Mg Tablet 100 Mg PO DAILY


Levemir (Insulin Detemir) 100 Unit/1 Ml Vial 70 Unit SQ QHS





Allergies


Allergies:  


Coded Allergies:  


     No Known Drug Allergies (Unverified , 10/3/14)





Physical Exam


General:  Alert, Cooperative, No acute distress


Lungs:  Clear to auscultation, Normal air movement


Heart:  Normal S1, Normal S2, No murmurs


Extremities:  No clubbing, No cyanosis, Normal pulses





Vitals


VITALS





 Vital Signs








  Date Time  Temp Pulse Resp B/P Pulse Ox O2 Delivery O2 Flow Rate FiO2


 


3/15/17 09:00  87  144/64    


 


3/15/17 07:40      Room Air  


 


3/15/17 07:35     96   


 


3/15/17 07:00 97.6  20     





 97.6       


 


3/15/17 04:19       14.0 











Labs


Labs





Laboratory Tests








Test


  3/14/17


18:00 3/14/17


18:20 3/14/17


20:30 3/14/17


22:05


 


White Blood Count


  12.0x10^3/uL


(4.0-11.0) 


  


  


 


 


Red Blood Count


  5.91x10^6/uL


(4.30-5.70) 


  


  


 


 


Hemoglobin


  15.6g/dL


(13.0-17.5) 


  


  


 


 


Hematocrit


  49.5%


(39.0-53.0) 


  


  


 


 


Mean Corpuscular Volume 84fL ()    


 


Mean Corpuscular Hemoglobin 26pg (25-35)    


 


Mean Corpuscular Hemoglobin


Concent 32g/dL (31-37) 


  


  


  


 


 


Red Cell Distribution Width


  15.3%


(11.5-14.5) 


  


  


 


 


Platelet Count


  218x10^3/uL


(140-400) 


  


  


 


 


Neutrophils (%) (Auto) 69% (31-73)    


 


Lymphocytes (%) (Auto) 19% (24-48)    


 


Monocytes (%) (Auto) 8% (0-9)    


 


Eosinophils (%) (Auto) 3% (0-3)    


 


Basophils (%) (Auto) 0% (0-3)    


 


Neutrophils # (Auto)


  8.4x10^3uL


(1.8-7.7) 


  


  


 


 


Lymphocytes # (Auto)


  2.3x10^3/uL


(1.0-4.8) 


  


  


 


 


Monocytes # (Auto)


  0.9x10^3/uL


(0.0-1.1) 


  


  


 


 


Eosinophils # (Auto)


  0.4x10^3/uL


(0.0-0.7) 


  


  


 


 


Basophils # (Auto)


  0.0x10^3/uL


(0.0-0.2) 


  


  


 


 


Sodium Level


  145mmol/L


(136-145) 


  


  


 


 


Potassium Level


  4.8mmol/L


(3.5-5.1) 


  


  


 


 


Chloride Level


  106mmol/L


() 


  


  


 


 


Carbon Dioxide Level


  32mmol/L


(21-32) 


  


  


 


 


Anion Gap 7 (6-14)    


 


Blood Urea Nitrogen 19mg/dL (8-26)    


 


Creatinine


  1.2mg/dL


(0.7-1.3) 


  


  


 


 


Estimated GFR


(Cockcroft-Gault) 60.6 


  


  


  


 


 


BUN/Creatinine Ratio 16 (6-20)    


 


Glucose Level


  142mg/dL


(70-99) 


  


  


 


 


Calcium Level


  8.9mg/dL


(8.5-10.1) 


  


  


 


 


Total Bilirubin


  0.5mg/dL


(0.2-1.0) 


  


  


 


 


Aspartate Amino Transf


(AST/SGOT) 38U/L (15-37) 


  


  


  


 


 


Alanine Aminotransferase


(ALT/SGPT) 56U/L (16-63) 


  


  


  


 


 


Alkaline Phosphatase 89U/L ()    


 


Troponin I Quantitative


  0.060ng/mL


(0.000-0.055) 


  


  


 


 


NT-Pro-B-Type Natriuretic


Peptide 1132pg/mL


(0-124) 


  


  


 


 


Total Protein


  8.4g/dL


(6.4-8.2) 


  


  


 


 


Albumin


  3.9g/dL


(3.4-5.0) 


  


  


 


 


Albumin/Globulin Ratio 0.9 (1.0-1.7)    


 


Influenza Type A Antigen


  


  Negative


(NEGATIVE) 


  


 


 


Influenza Type B Antigen


  


  Negative


(NEGATIVE) 


  


 


 


O2 Saturation   98% (92-99)  


 


Arterial Blood pH


  


  


  7.36


(7.35-7.45) 


 


 


Arterial Blood pCO2 at


Patient Temp 


  


  48mmHg (35-46) 


  


 


 


Arterial Blood pO2 at Patient


Temp 


  


  135mmHg


() 


 


 


Arterial Blood HCO3


  


  


  27mmol/L


(21-28) 


 


 


Arterial Blood Base Excess   1mmol/L (-3-3)  


 


FiO2   40.0  


 


Glucose (Fingerstick)


  


  


  


  210mg/dL


(70-99)














Test


  3/15/17


00:15 3/15/17


06:20 3/15/17


07:55 


 


 


Troponin I Quantitative


  0.538ng/mL


(0.000-0.055) 0.482ng/mL


(0.000-0.055) 


  


 


 


White Blood Count


  


  12.8x10^3/uL


(4.0-11.0) 


  


 


 


Red Blood Count


  


  5.68x10^6/uL


(4.30-5.70) 


  


 


 


Hemoglobin


  


  14.8g/dL


(13.0-17.5) 


  


 


 


Hematocrit


  


  46.5%


(39.0-53.0) 


  


 


 


Mean Corpuscular Volume  82fL ()   


 


Mean Corpuscular Hemoglobin  26pg (25-35)   


 


Mean Corpuscular Hemoglobin


Concent 


  32g/dL (31-37) 


  


  


 


 


Red Cell Distribution Width


  


  15.2%


(11.5-14.5) 


  


 


 


Platelet Count


  


  191x10^3/uL


(140-400) 


  


 


 


Neutrophils (%) (Auto)  93% (31-73)   


 


Lymphocytes (%) (Auto)  5% (24-48)   


 


Monocytes (%) (Auto)  2% (0-9)   


 


Eosinophils (%) (Auto)  0% (0-3)   


 


Basophils (%) (Auto)  0% (0-3)   


 


Neutrophils # (Auto)


  


  11.9x10^3uL


(1.8-7.7) 


  


 


 


Lymphocytes # (Auto)


  


  0.6x10^3/uL


(1.0-4.8) 


  


 


 


Monocytes # (Auto)


  


  0.3x10^3/uL


(0.0-1.1) 


  


 


 


Eosinophils # (Auto)


  


  0.0x10^3/uL


(0.0-0.7) 


  


 


 


Basophils # (Auto)


  


  0.0x10^3/uL


(0.0-0.2) 


  


 


 


Sodium Level


  


  138mmol/L


(136-145) 


  


 


 


Potassium Level


  


  5.3mmol/L


(3.5-5.1) 


  


 


 


Chloride Level


  


  100mmol/L


() 


  


 


 


Carbon Dioxide Level


  


  29mmol/L


(21-32) 


  


 


 


Anion Gap  9 (6-14)   


 


Blood Urea Nitrogen  23mg/dL (8-26)   


 


Creatinine


  


  1.3mg/dL


(0.7-1.3) 


  


 


 


Estimated GFR


(Cockcroft-Gault) 


  55.2 


  


  


 


 


Glucose Level


  


  271mg/dL


(70-99) 


  


 


 


Calcium Level


  


  9.0mg/dL


(8.5-10.1) 


  


 


 


Glucose (Fingerstick)


  


  


  277mg/dL


(70-99) 


 








Laboratory Tests








Test


  3/14/17


18:00 3/14/17


18:20 3/14/17


20:30 3/14/17


22:05


 


White Blood Count


  12.0x10^3/uL


(4.0-11.0) 


  


  


 


 


Red Blood Count


  5.91x10^6/uL


(4.30-5.70) 


  


  


 


 


Hemoglobin


  15.6g/dL


(13.0-17.5) 


  


  


 


 


Hematocrit


  49.5%


(39.0-53.0) 


  


  


 


 


Mean Corpuscular Volume 84fL ()    


 


Mean Corpuscular Hemoglobin 26pg (25-35)    


 


Mean Corpuscular Hemoglobin


Concent 32g/dL (31-37) 


  


  


  


 


 


Red Cell Distribution Width


  15.3%


(11.5-14.5) 


  


  


 


 


Platelet Count


  218x10^3/uL


(140-400) 


  


  


 


 


Neutrophils (%) (Auto) 69% (31-73)    


 


Lymphocytes (%) (Auto) 19% (24-48)    


 


Monocytes (%) (Auto) 8% (0-9)    


 


Eosinophils (%) (Auto) 3% (0-3)    


 


Basophils (%) (Auto) 0% (0-3)    


 


Neutrophils # (Auto)


  8.4x10^3uL


(1.8-7.7) 


  


  


 


 


Lymphocytes # (Auto)


  2.3x10^3/uL


(1.0-4.8) 


  


  


 


 


Monocytes # (Auto)


  0.9x10^3/uL


(0.0-1.1) 


  


  


 


 


Eosinophils # (Auto)


  0.4x10^3/uL


(0.0-0.7) 


  


  


 


 


Basophils # (Auto)


  0.0x10^3/uL


(0.0-0.2) 


  


  


 


 


Sodium Level


  145mmol/L


(136-145) 


  


  


 


 


Potassium Level


  4.8mmol/L


(3.5-5.1) 


  


  


 


 


Chloride Level


  106mmol/L


() 


  


  


 


 


Carbon Dioxide Level


  32mmol/L


(21-32) 


  


  


 


 


Anion Gap 7 (6-14)    


 


Blood Urea Nitrogen 19mg/dL (8-26)    


 


Creatinine


  1.2mg/dL


(0.7-1.3) 


  


  


 


 


Estimated GFR


(Cockcroft-Gault) 60.6 


  


  


  


 


 


BUN/Creatinine Ratio 16 (6-20)    


 


Glucose Level


  142mg/dL


(70-99) 


  


  


 


 


Calcium Level


  8.9mg/dL


(8.5-10.1) 


  


  


 


 


Total Bilirubin


  0.5mg/dL


(0.2-1.0) 


  


  


 


 


Aspartate Amino Transf


(AST/SGOT) 38U/L (15-37) 


  


  


  


 


 


Alanine Aminotransferase


(ALT/SGPT) 56U/L (16-63) 


  


  


  


 


 


Alkaline Phosphatase 89U/L ()    


 


Troponin I Quantitative


  0.060ng/mL


(0.000-0.055) 


  


  


 


 


NT-Pro-B-Type Natriuretic


Peptide 1132pg/mL


(0-124) 


  


  


 


 


Total Protein


  8.4g/dL


(6.4-8.2) 


  


  


 


 


Albumin


  3.9g/dL


(3.4-5.0) 


  


  


 


 


Albumin/Globulin Ratio 0.9 (1.0-1.7)    


 


Influenza Type A Antigen


  


  Negative


(NEGATIVE) 


  


 


 


Influenza Type B Antigen


  


  Negative


(NEGATIVE) 


  


 


 


O2 Saturation   98% (92-99)  


 


Arterial Blood pH


  


  


  7.36


(7.35-7.45) 


 


 


Arterial Blood pCO2 at


Patient Temp 


  


  48mmHg (35-46) 


  


 


 


Arterial Blood pO2 at Patient


Temp 


  


  135mmHg


() 


 


 


Arterial Blood HCO3


  


  


  27mmol/L


(21-28) 


 


 


Arterial Blood Base Excess   1mmol/L (-3-3)  


 


FiO2   40.0  


 


Glucose (Fingerstick)


  


  


  


  210mg/dL


(70-99)














Test


  3/15/17


00:15 3/15/17


06:20 3/15/17


07:55 


 


 


Troponin I Quantitative


  0.538ng/mL


(0.000-0.055) 0.482ng/mL


(0.000-0.055) 


  


 


 


White Blood Count


  


  12.8x10^3/uL


(4.0-11.0) 


  


 


 


Red Blood Count


  


  5.68x10^6/uL


(4.30-5.70) 


  


 


 


Hemoglobin


  


  14.8g/dL


(13.0-17.5) 


  


 


 


Hematocrit


  


  46.5%


(39.0-53.0) 


  


 


 


Mean Corpuscular Volume  82fL ()   


 


Mean Corpuscular Hemoglobin  26pg (25-35)   


 


Mean Corpuscular Hemoglobin


Concent 


  32g/dL (31-37) 


  


  


 


 


Red Cell Distribution Width


  


  15.2%


(11.5-14.5) 


  


 


 


Platelet Count


  


  191x10^3/uL


(140-400) 


  


 


 


Neutrophils (%) (Auto)  93% (31-73)   


 


Lymphocytes (%) (Auto)  5% (24-48)   


 


Monocytes (%) (Auto)  2% (0-9)   


 


Eosinophils (%) (Auto)  0% (0-3)   


 


Basophils (%) (Auto)  0% (0-3)   


 


Neutrophils # (Auto)


  


  11.9x10^3uL


(1.8-7.7) 


  


 


 


Lymphocytes # (Auto)


  


  0.6x10^3/uL


(1.0-4.8) 


  


 


 


Monocytes # (Auto)


  


  0.3x10^3/uL


(0.0-1.1) 


  


 


 


Eosinophils # (Auto)


  


  0.0x10^3/uL


(0.0-0.7) 


  


 


 


Basophils # (Auto)


  


  0.0x10^3/uL


(0.0-0.2) 


  


 


 


Sodium Level


  


  138mmol/L


(136-145) 


  


 


 


Potassium Level


  


  5.3mmol/L


(3.5-5.1) 


  


 


 


Chloride Level


  


  100mmol/L


() 


  


 


 


Carbon Dioxide Level


  


  29mmol/L


(21-32) 


  


 


 


Anion Gap  9 (6-14)   


 


Blood Urea Nitrogen  23mg/dL (8-26)   


 


Creatinine


  


  1.3mg/dL


(0.7-1.3) 


  


 


 


Estimated GFR


(Cockcroft-Gault) 


  55.2 


  


  


 


 


Glucose Level


  


  271mg/dL


(70-99) 


  


 


 


Calcium Level


  


  9.0mg/dL


(8.5-10.1) 


  


 


 


Glucose (Fingerstick)


  


  


  277mg/dL


(70-99) 


 











Assessment/Plan


Assessment/Plan


Pt experienced severe SOB without c/o chest pain, ECG showed no evidence of 

Acute MI with tachycardia. Recommend echocardiogram for further workup due to 

elevated troponin. 





To me the elevated troponin is probably secondary to ischemia from profound 

hypoxia when he came in.





Thank you for asking me to participate in the care of this pt.








URSULA CHU MD Mar 15, 2017 10:38

## 2017-03-16 VITALS — SYSTOLIC BLOOD PRESSURE: 129 MMHG | DIASTOLIC BLOOD PRESSURE: 75 MMHG

## 2017-03-16 VITALS — DIASTOLIC BLOOD PRESSURE: 69 MMHG | SYSTOLIC BLOOD PRESSURE: 142 MMHG

## 2017-03-16 VITALS — SYSTOLIC BLOOD PRESSURE: 114 MMHG | DIASTOLIC BLOOD PRESSURE: 63 MMHG

## 2017-03-16 LAB
ANION GAP SERPL CALC-SCNC: 8 MMOL/L (ref 6–14)
BUN SERPL-MCNC: 29 MG/DL (ref 8–26)
CALCIUM SERPL-MCNC: 8.9 MG/DL (ref 8.5–10.1)
CHLORIDE SERPL-SCNC: 103 MMOL/L (ref 98–107)
CO2 SERPL-SCNC: 30 MMOL/L (ref 21–32)
CREAT SERPL-MCNC: 1.4 MG/DL (ref 0.7–1.3)
GFR SERPLBLD BASED ON 1.73 SQ M-ARVRAT: 50.7 ML/MIN
GLUCOSE SERPL-MCNC: 166 MG/DL (ref 70–99)
POTASSIUM SERPL-SCNC: 4.2 MMOL/L (ref 3.5–5.1)
SODIUM SERPL-SCNC: 141 MMOL/L (ref 136–145)

## 2017-03-16 RX ADMIN — INSULIN ASPART SCH UNITS: 100 INJECTION, SOLUTION INTRAVENOUS; SUBCUTANEOUS at 07:30

## 2017-03-16 RX ADMIN — BUDESONIDE SCH MG: 0.5 SUSPENSION RESPIRATORY (INHALATION) at 07:35

## 2017-03-16 RX ADMIN — INSULIN ASPART SCH UNITS: 100 INJECTION, SOLUTION INTRAVENOUS; SUBCUTANEOUS at 13:08

## 2017-03-16 RX ADMIN — IPRATROPIUM BROMIDE AND ALBUTEROL SULFATE SCH ML: .5; 3 SOLUTION RESPIRATORY (INHALATION) at 07:35

## 2017-03-16 RX ADMIN — IPRATROPIUM BROMIDE AND ALBUTEROL SULFATE SCH ML: .5; 3 SOLUTION RESPIRATORY (INHALATION) at 11:12

## 2017-03-16 RX ADMIN — INSULIN ASPART SCH UNITS: 100 INJECTION, SOLUTION INTRAVENOUS; SUBCUTANEOUS at 08:00

## 2017-03-16 RX ADMIN — LOSARTAN POTASSIUM SCH MG: 50 TABLET, FILM COATED ORAL at 09:47

## 2017-03-16 RX ADMIN — IPRATROPIUM BROMIDE AND ALBUTEROL SULFATE SCH ML: .5; 3 SOLUTION RESPIRATORY (INHALATION) at 16:13

## 2017-03-16 RX ADMIN — INSULIN ASPART SCH UNITS: 100 INJECTION, SOLUTION INTRAVENOUS; SUBCUTANEOUS at 12:00

## 2017-03-16 NOTE — PDOC
PROGRESS NOTES


Subjective


Subjective


Pt s/e and reports he is doing well. Denies any chest pain or tightness, 

reports continued SOB but it is improving.





Objective


Objective





 Vital Signs








  Date Time  Temp Pulse Resp B/P Pulse Ox O2 Delivery O2 Flow Rate FiO2


 


3/16/17 11:22 97.9 72 17 114/63 97 Nasal Cannula 2.0 





 97.9       














 Intake and Output 


 


 3/16/17





 07:00


 


Intake Total 1270 ml


 


Output Total 1425 ml


 


Balance -155 ml


 


 


 


Intake Oral 1120 ml


 


IV Total 150 ml


 


Output Urine Total 1425 ml











Physical Exam


Heart:  Regular rate, Normal S1, Normal S2, No murmurs


Extremities:  No clubbing, No cyanosis, No edema


General:  Alert, Cooperative, No acute distress


Lungs:  Clear to auscultation


Neck:  Supple, No JVD





Assessment


Assessment





(2) COPD (chronic obstructive pulmonary disease)


Status: Acute  





(3) Respiratory failure


Status: Acute  








Plan


Plan of Care


Pt to d/c home, discussed f/u appointment as an outpatient in approx 4 weeks





Comment


Review of Relevant


I have reviewed the following items elfego (where applicable) has been applied.


Labs





Laboratory Tests








Test


  3/14/17


18:00 3/14/17


18:20 3/14/17


20:30 3/14/17


22:05


 


White Blood Count


  12.0x10^3/uL


(4.0-11.0) 


  


  


 


 


Red Blood Count


  5.91x10^6/uL


(4.30-5.70) 


  


  


 


 


Hemoglobin


  15.6g/dL


(13.0-17.5) 


  


  


 


 


Hematocrit


  49.5%


(39.0-53.0) 


  


  


 


 


Mean Corpuscular Volume 84fL ()    


 


Mean Corpuscular Hemoglobin 26pg (25-35)    


 


Mean Corpuscular Hemoglobin


Concent 32g/dL (31-37) 


  


  


  


 


 


Red Cell Distribution Width


  15.3%


(11.5-14.5) 


  


  


 


 


Platelet Count


  218x10^3/uL


(140-400) 


  


  


 


 


Neutrophils (%) (Auto) 69% (31-73)    


 


Lymphocytes (%) (Auto) 19% (24-48)    


 


Monocytes (%) (Auto) 8% (0-9)    


 


Eosinophils (%) (Auto) 3% (0-3)    


 


Basophils (%) (Auto) 0% (0-3)    


 


Neutrophils # (Auto)


  8.4x10^3uL


(1.8-7.7) 


  


  


 


 


Lymphocytes # (Auto)


  2.3x10^3/uL


(1.0-4.8) 


  


  


 


 


Monocytes # (Auto)


  0.9x10^3/uL


(0.0-1.1) 


  


  


 


 


Eosinophils # (Auto)


  0.4x10^3/uL


(0.0-0.7) 


  


  


 


 


Basophils # (Auto)


  0.0x10^3/uL


(0.0-0.2) 


  


  


 


 


Sodium Level


  145mmol/L


(136-145) 


  


  


 


 


Potassium Level


  4.8mmol/L


(3.5-5.1) 


  


  


 


 


Chloride Level


  106mmol/L


() 


  


  


 


 


Carbon Dioxide Level


  32mmol/L


(21-32) 


  


  


 


 


Anion Gap 7 (6-14)    


 


Blood Urea Nitrogen 19mg/dL (8-26)    


 


Creatinine


  1.2mg/dL


(0.7-1.3) 


  


  


 


 


Estimated GFR


(Cockcroft-Gault) 60.6 


  


  


  


 


 


BUN/Creatinine Ratio 16 (6-20)    


 


Glucose Level


  142mg/dL


(70-99) 


  


  


 


 


Calcium Level


  8.9mg/dL


(8.5-10.1) 


  


  


 


 


Total Bilirubin


  0.5mg/dL


(0.2-1.0) 


  


  


 


 


Aspartate Amino Transf


(AST/SGOT) 38U/L (15-37) 


  


  


  


 


 


Alanine Aminotransferase


(ALT/SGPT) 56U/L (16-63) 


  


  


  


 


 


Alkaline Phosphatase 89U/L ()    


 


Troponin I Quantitative


  0.060ng/mL


(0.000-0.055) 


  


  


 


 


NT-Pro-B-Type Natriuretic


Peptide 1132pg/mL


(0-124) 


  


  


 


 


Total Protein


  8.4g/dL


(6.4-8.2) 


  


  


 


 


Albumin


  3.9g/dL


(3.4-5.0) 


  


  


 


 


Albumin/Globulin Ratio 0.9 (1.0-1.7)    


 


Influenza Type A Antigen


  


  Negative


(NEGATIVE) 


  


 


 


Influenza Type B Antigen


  


  Negative


(NEGATIVE) 


  


 


 


O2 Saturation   98% (92-99)  


 


Arterial Blood pH


  


  


  7.36


(7.35-7.45) 


 


 


Arterial Blood pCO2 at


Patient Temp 


  


  48mmHg (35-46) 


  


 


 


Arterial Blood pO2 at Patient


Temp 


  


  135mmHg


() 


 


 


Arterial Blood HCO3


  


  


  27mmol/L


(21-28) 


 


 


Arterial Blood Base Excess   1mmol/L (-3-3)  


 


FiO2   40.0  


 


Glucose (Fingerstick)


  


  


  


  210mg/dL


(70-99)














Test


  3/15/17


00:15 3/15/17


06:20 3/15/17


07:55 3/15/17


11:49


 


Troponin I Quantitative


  0.538ng/mL


(0.000-0.055) 0.482ng/mL


(0.000-0.055) 


  


 


 


White Blood Count


  


  12.8x10^3/uL


(4.0-11.0) 


  


 


 


Red Blood Count


  


  5.68x10^6/uL


(4.30-5.70) 


  


 


 


Hemoglobin


  


  14.8g/dL


(13.0-17.5) 


  


 


 


Hematocrit


  


  46.5%


(39.0-53.0) 


  


 


 


Mean Corpuscular Volume  82fL ()   


 


Mean Corpuscular Hemoglobin  26pg (25-35)   


 


Mean Corpuscular Hemoglobin


Concent 


  32g/dL (31-37) 


  


  


 


 


Red Cell Distribution Width


  


  15.2%


(11.5-14.5) 


  


 


 


Platelet Count


  


  191x10^3/uL


(140-400) 


  


 


 


Neutrophils (%) (Auto)  93% (31-73)   


 


Lymphocytes (%) (Auto)  5% (24-48)   


 


Monocytes (%) (Auto)  2% (0-9)   


 


Eosinophils (%) (Auto)  0% (0-3)   


 


Basophils (%) (Auto)  0% (0-3)   


 


Neutrophils # (Auto)


  


  11.9x10^3uL


(1.8-7.7) 


  


 


 


Lymphocytes # (Auto)


  


  0.6x10^3/uL


(1.0-4.8) 


  


 


 


Monocytes # (Auto)


  


  0.3x10^3/uL


(0.0-1.1) 


  


 


 


Eosinophils # (Auto)


  


  0.0x10^3/uL


(0.0-0.7) 


  


 


 


Basophils # (Auto)


  


  0.0x10^3/uL


(0.0-0.2) 


  


 


 


Segmented Neutrophils %  87% (35-66)   


 


Band Neutrophils %  9% (0-9)   


 


Lymphocytes %  3% (24-48)   


 


Monocytes %  1% (0-10)   


 


Platelet Estimate


  


  Adequate


(ADEQUATE) 


  


 


 


Platelet Clumps, EDTA  Present   


 


Large Platelets     


 


Giant Platelets  Present   


 


Sodium Level


  


  138mmol/L


(136-145) 


  


 


 


Potassium Level


  


  5.3mmol/L


(3.5-5.1) 


  


 


 


Chloride Level


  


  100mmol/L


() 


  


 


 


Carbon Dioxide Level


  


  29mmol/L


(21-32) 


  


 


 


Anion Gap  9 (6-14)   


 


Blood Urea Nitrogen  23mg/dL (8-26)   


 


Creatinine


  


  1.3mg/dL


(0.7-1.3) 


  


 


 


Estimated GFR


(Cockcroft-Gault) 


  55.2 


  


  


 


 


Glucose Level


  


  271mg/dL


(70-99) 


  


 


 


Calcium Level


  


  9.0mg/dL


(8.5-10.1) 


  


 


 


Glucose (Fingerstick)


  


  


  277mg/dL


(70-99) 256mg/dL


(70-99)














Test


  3/15/17


16:49 3/15/17


20:58 3/16/17


03:52 3/16/17


08:22


 


Glucose (Fingerstick)


  221mg/dL


(70-99) 178mg/dL


(70-99) 


  149mg/dL


(70-99)


 


Sodium Level


  


  


  141mmol/L


(136-145) 


 


 


Potassium Level


  


  


  4.2mmol/L


(3.5-5.1) 


 


 


Chloride Level


  


  


  103mmol/L


() 


 


 


Carbon Dioxide Level


  


  


  30mmol/L


(21-32) 


 


 


Anion Gap   8 (6-14)  


 


Blood Urea Nitrogen   29mg/dL (8-26)  


 


Creatinine


  


  


  1.4mg/dL


(0.7-1.3) 


 


 


Estimated GFR


(Cockcroft-Gault) 


  


  50.7 


  


 


 


Glucose Level


  


  


  166mg/dL


(70-99) 


 


 


Calcium Level


  


  


  8.9mg/dL


(8.5-10.1) 


 














Test


  3/16/17


11:26 


  


  


 


 


Glucose (Fingerstick)


  172mg/dL


(70-99) 


  


  


 








Laboratory Tests








Test


  3/15/17


16:49 3/15/17


20:58 3/16/17


03:52 3/16/17


08:22


 


Glucose (Fingerstick)


  221mg/dL


(70-99) 178mg/dL


(70-99) 


  149mg/dL


(70-99)


 


Sodium Level


  


  


  141mmol/L


(136-145) 


 


 


Potassium Level


  


  


  4.2mmol/L


(3.5-5.1) 


 


 


Chloride Level


  


  


  103mmol/L


() 


 


 


Carbon Dioxide Level


  


  


  30mmol/L


(21-32) 


 


 


Anion Gap   8 (6-14)  


 


Blood Urea Nitrogen   29mg/dL (8-26)  


 


Creatinine


  


  


  1.4mg/dL


(0.7-1.3) 


 


 


Estimated GFR


(Cockcroft-Gault) 


  


  50.7 


  


 


 


Glucose Level


  


  


  166mg/dL


(70-99) 


 


 


Calcium Level


  


  


  8.9mg/dL


(8.5-10.1) 


 














Test


  3/16/17


11:26 


  


  


 


 


Glucose (Fingerstick)


  172mg/dL


(70-99) 


  


  


 








Microbiology


3/14/17 Blood Culture - Preliminary, Resulted


          NO GROWTH AFTER 1 DAY


Medications





 Current Medications


Albuterol/ Ipratropium (Duoneb) 3 ml 1X  ONCE NEB  Last administered on 3/14/

17at 18:13;  Start 3/14/17 at 18:15;  Stop 3/14/17 at 18:16;  Status DC


Methylprednisolone Sodium Succinate (Solu-Medrol 125mg Vial) 125 mg 1X  ONCE IV

  Last administered on 3/14/17at 18:23;  Start 3/14/17 at 18:15;  Stop 3/14/17 

at 18:16;  Status DC


Fentanyl Citrate (Fentanyl 2ml Vial) 25 mcg PRN Q15MIN  PRN IV PAIN GREATER 

THAN 3/10;  Start 3/14/17 at 19:15;  Stop 3/15/17 at 19:14;  Status DC


Nitroglycerin (Nitrostat) 0.4 mg PRN Q5MIN  PRN SL CHEST PAIN;  Start 3/14/17 

at 19:15


Furosemide (Lasix) 20 mg 1X  ONCE IVP  Last administered on 3/14/17at 19:27;  

Start 3/14/17 at 19:15;  Stop 3/14/17 at 19:16;  Status DC


Aspirin (Reggie Aspirin) 325 mg 1X  ONCE PO ;  Start 3/14/17 at 19:15;  Stop 3/14

/17 at 19:16;  Status DC


Ondansetron HCl (Zofran) 4 mg PRN Q8HRS  PRN IV NAUSEA/VOMITING;  Start 3/14/17 

at 20:30;  Stop 3/15/17 at 20:29;  Status DC


Morphine Sulfate 4 mg PRN Q2HR  PRN IV PAIN;  Start 3/14/17 at 20:30;  Stop 3/15

/17 at 20:29;  Status DC


Acetaminophen (Tylenol) 650 mg PRN Q4HRS  PRN PO FEVER;  Start 3/14/17 at 20:30

;  Stop 3/15/17 at 20:29;  Status DC


Nitroglycerin (Nitrostat) 0.4 mg PRN Q5MIN  PRN SL CHEST PAIN;  Start 3/14/17 

at 20:30;  Stop 3/14/17 at 20:31;  Status DC


Albuterol/ Ipratropium (Duoneb) 3 ml RTQID NEB  Last administered on 3/16/17at 

11:12;  Start 3/14/17 at 20:30;  Stop 3/16/17 at 20:29


Insulin Aspart (Novolog) 0-5 UNITS TIDWMEALS SQ  Last administered on 3/15/17at 

09:18;  Start 3/15/17 at 08:00


Dextrose 12.5 gm PRN Q15MIN  PRN IV SEE COMMENTS;  Start 3/14/17 at 20:30


Levofloxacin/ Dextrose 1 each 1 each PRN DAILY  PRN MC SEE COMMENTS;  Start 3/14

/17 at 20:45;  Stop 3/15/17 at 15:17;  Status DC


Levofloxacin/ Dextrose (LEVAQUIN 750mg PREMIX) 150 ml @  100 mls/hr Q24H IV  

Last administered on 3/15/17at 00:08;  Start 3/14/17 at 21:00;  Stop 3/15/17 at 

15:16;  Status DC


Losartan Potassium (Cozaar) 100 mg DAILY PO  Last administered on 3/16/17at 09:

47;  Start 3/15/17 at 09:00


Non-Formulary Medication 2 puff QID INH FOR ASTHMA;  Start 3/15/17 at 09:00;  

Status UNV


Non-Formulary Medication 2 puff BID IH ;  Start 3/15/17 at 09:00;  Status UNV


Insulin Detemir (Levemir) 70 units QHS SQ  Last administered on 3/15/17at 21:57

;  Start 3/15/17 at 00:30


Non-Formulary Medication 4 gm DAILY IH ;  Start 3/15/17 at 09:00;  Status UNV


Budesonide (Pulmicort) 0.5 mg RTBID NEB  Last administered on 3/16/17at 07:35;  

Start 3/15/17 at 08:00


Insulin Aspart (Novolog) 30 units TIDAC SQ  Last administered on 3/16/17at 13:08

;  Start 3/15/17 at 11:30


Docusate Sodium 100 mg 100 mg PRN DAILY  PRN PO CONSTIPATION;  Start 3/15/17 at 

10:00


Levofloxacin/ Dextrose (LEVAQUIN 750mg PREMIX) 150 ml @  100 mls/hr Q24H IV  

Last administered on 3/15/17at 21:38;  Start 3/15/17 at 22:00


Throat Lozenges (Cepacol Sore Throat Lozenge) 1 juan PRN Q2HRS  PRN PO SORE 

THROAT;  Start 3/15/17 at 21:15





Active Scripts


Active


Reported


Stiolto Respimat Inhal Spray (Tiotropium Br/Olodaterol HCl) 4 Gm Mist.inhal 4 

Gm IH DAILY


Novolog Flexpen (Insulin Aspart) 100 Unit/1 Ml Insuln.pen 1 Unit SQ 


Ventolin Hfa Inhaler (Albuterol Sulfate) 18 Gm Hfa.aer.ad 2 Puff INH QID


Flovent 110MCG Hfa (Fluticasone Propionate) 12 Gm Aer.w.adap 2 Puff IH BID


Losartan Potassium 25 Mg Tablet 100 Mg PO DAILY


Levemir (Insulin Detemir) 100 Unit/1 Ml Vial 70 Unit SQ QHS


Vitals/I & O





 Vital Sign - Last 24 Hours








 3/15/17 3/15/17 3/15/17 3/15/17





 15:12 15:26 19:00 19:28


 


Temp  97.8 97.9 





  97.8 97.9 


 


Pulse  87 70 


 


Resp  20 24 


 


B/P  121/61 125/57 


 


Pulse Ox 98 97 99 98


 


O2 Delivery  Aerosol Mask Nasal Cannula Nasal Cannula


 


O2 Flow Rate   2.0 2.0


 


    





    





 3/15/17 3/15/17 3/15/17 3/16/17





 19:28 20:00 23:39 03:07


 


Temp   97.4 97.7





   97.4 97.7


 


Pulse   71 61


 


Resp   20 20


 


B/P   112/52 142/69


 


Pulse Ox 98  100 97


 


O2 Delivery Nasal Cannula Room Air Nasal Cannula Nasal Cannula


 


O2 Flow Rate 2.0  2.0 2.0


 


    





    





 3/16/17 3/16/17 3/16/17 3/16/17





 07:38 09:47 11:14 11:22


 


Temp    97.9





    97.9


 


Pulse  61  72


 


Resp    17


 


B/P  142/69  114/63


 


Pulse Ox 97  96 97


 


O2 Delivery Nasal Cannula  Nasal Cannula Nasal Cannula


 


O2 Flow Rate 2.0  2.0 2.0














 Intake and Output   


 


 3/15/17 3/15/17 3/16/17





 15:00 23:00 07:00


 


Intake Total  800 ml 470 ml


 


Output Total 400 ml 650 ml 375 ml


 


Balance -400 ml 150 ml 95 ml














URSULA CHU MD Mar 16, 2017 14:02

## 2017-03-16 NOTE — PDOC
PULMONARY PROGRESS NOTES


Vitals





 Vital Signs








  Date Time  Temp Pulse Resp B/P Pulse Ox O2 Delivery O2 Flow Rate FiO2


 


3/16/17 09:47  61  142/69    


 


3/16/17 07:38     97 Nasal Cannula 2.0 


 


3/16/17 03:07 97.7  20     





 97.7       








Labs





Laboratory Tests








Test


  3/14/17


18:00 3/14/17


18:20 3/14/17


20:30 3/14/17


22:05


 


White Blood Count


  12.0x10^3/uL


(4.0-11.0) 


  


  


 


 


Red Blood Count


  5.91x10^6/uL


(4.30-5.70) 


  


  


 


 


Hemoglobin


  15.6g/dL


(13.0-17.5) 


  


  


 


 


Hematocrit


  49.5%


(39.0-53.0) 


  


  


 


 


Mean Corpuscular Volume 84fL ()    


 


Mean Corpuscular Hemoglobin 26pg (25-35)    


 


Mean Corpuscular Hemoglobin


Concent 32g/dL (31-37) 


  


  


  


 


 


Red Cell Distribution Width


  15.3%


(11.5-14.5) 


  


  


 


 


Platelet Count


  218x10^3/uL


(140-400) 


  


  


 


 


Neutrophils (%) (Auto) 69% (31-73)    


 


Lymphocytes (%) (Auto) 19% (24-48)    


 


Monocytes (%) (Auto) 8% (0-9)    


 


Eosinophils (%) (Auto) 3% (0-3)    


 


Basophils (%) (Auto) 0% (0-3)    


 


Neutrophils # (Auto)


  8.4x10^3uL


(1.8-7.7) 


  


  


 


 


Lymphocytes # (Auto)


  2.3x10^3/uL


(1.0-4.8) 


  


  


 


 


Monocytes # (Auto)


  0.9x10^3/uL


(0.0-1.1) 


  


  


 


 


Eosinophils # (Auto)


  0.4x10^3/uL


(0.0-0.7) 


  


  


 


 


Basophils # (Auto)


  0.0x10^3/uL


(0.0-0.2) 


  


  


 


 


Sodium Level


  145mmol/L


(136-145) 


  


  


 


 


Potassium Level


  4.8mmol/L


(3.5-5.1) 


  


  


 


 


Chloride Level


  106mmol/L


() 


  


  


 


 


Carbon Dioxide Level


  32mmol/L


(21-32) 


  


  


 


 


Anion Gap 7 (6-14)    


 


Blood Urea Nitrogen 19mg/dL (8-26)    


 


Creatinine


  1.2mg/dL


(0.7-1.3) 


  


  


 


 


Estimated GFR


(Cockcroft-Gault) 60.6 


  


  


  


 


 


BUN/Creatinine Ratio 16 (6-20)    


 


Glucose Level


  142mg/dL


(70-99) 


  


  


 


 


Calcium Level


  8.9mg/dL


(8.5-10.1) 


  


  


 


 


Total Bilirubin


  0.5mg/dL


(0.2-1.0) 


  


  


 


 


Aspartate Amino Transf


(AST/SGOT) 38U/L (15-37) 


  


  


  


 


 


Alanine Aminotransferase


(ALT/SGPT) 56U/L (16-63) 


  


  


  


 


 


Alkaline Phosphatase 89U/L ()    


 


Troponin I Quantitative


  0.060ng/mL


(0.000-0.055) 


  


  


 


 


NT-Pro-B-Type Natriuretic


Peptide 1132pg/mL


(0-124) 


  


  


 


 


Total Protein


  8.4g/dL


(6.4-8.2) 


  


  


 


 


Albumin


  3.9g/dL


(3.4-5.0) 


  


  


 


 


Albumin/Globulin Ratio 0.9 (1.0-1.7)    


 


Influenza Type A Antigen


  


  Negative


(NEGATIVE) 


  


 


 


Influenza Type B Antigen


  


  Negative


(NEGATIVE) 


  


 


 


O2 Saturation   98% (92-99)  


 


Arterial Blood pH


  


  


  7.36


(7.35-7.45) 


 


 


Arterial Blood pCO2 at


Patient Temp 


  


  48mmHg (35-46) 


  


 


 


Arterial Blood pO2 at Patient


Temp 


  


  135mmHg


() 


 


 


Arterial Blood HCO3


  


  


  27mmol/L


(21-28) 


 


 


Arterial Blood Base Excess   1mmol/L (-3-3)  


 


FiO2   40.0  


 


Glucose (Fingerstick)


  


  


  


  210mg/dL


(70-99)














Test


  3/15/17


00:15 3/15/17


06:20 3/15/17


07:55 3/15/17


11:49


 


Troponin I Quantitative


  0.538ng/mL


(0.000-0.055) 0.482ng/mL


(0.000-0.055) 


  


 


 


White Blood Count


  


  12.8x10^3/uL


(4.0-11.0) 


  


 


 


Red Blood Count


  


  5.68x10^6/uL


(4.30-5.70) 


  


 


 


Hemoglobin


  


  14.8g/dL


(13.0-17.5) 


  


 


 


Hematocrit


  


  46.5%


(39.0-53.0) 


  


 


 


Mean Corpuscular Volume  82fL ()   


 


Mean Corpuscular Hemoglobin  26pg (25-35)   


 


Mean Corpuscular Hemoglobin


Concent 


  32g/dL (31-37) 


  


  


 


 


Red Cell Distribution Width


  


  15.2%


(11.5-14.5) 


  


 


 


Platelet Count


  


  191x10^3/uL


(140-400) 


  


 


 


Neutrophils (%) (Auto)  93% (31-73)   


 


Lymphocytes (%) (Auto)  5% (24-48)   


 


Monocytes (%) (Auto)  2% (0-9)   


 


Eosinophils (%) (Auto)  0% (0-3)   


 


Basophils (%) (Auto)  0% (0-3)   


 


Neutrophils # (Auto)


  


  11.9x10^3uL


(1.8-7.7) 


  


 


 


Lymphocytes # (Auto)


  


  0.6x10^3/uL


(1.0-4.8) 


  


 


 


Monocytes # (Auto)


  


  0.3x10^3/uL


(0.0-1.1) 


  


 


 


Eosinophils # (Auto)


  


  0.0x10^3/uL


(0.0-0.7) 


  


 


 


Basophils # (Auto)


  


  0.0x10^3/uL


(0.0-0.2) 


  


 


 


Segmented Neutrophils %  87% (35-66)   


 


Band Neutrophils %  9% (0-9)   


 


Lymphocytes %  3% (24-48)   


 


Monocytes %  1% (0-10)   


 


Platelet Estimate


  


  Adequate


(ADEQUATE) 


  


 


 


Platelet Clumps, EDTA  Present   


 


Large Platelets     


 


Giant Platelets  Present   


 


Sodium Level


  


  138mmol/L


(136-145) 


  


 


 


Potassium Level


  


  5.3mmol/L


(3.5-5.1) 


  


 


 


Chloride Level


  


  100mmol/L


() 


  


 


 


Carbon Dioxide Level


  


  29mmol/L


(21-32) 


  


 


 


Anion Gap  9 (6-14)   


 


Blood Urea Nitrogen  23mg/dL (8-26)   


 


Creatinine


  


  1.3mg/dL


(0.7-1.3) 


  


 


 


Estimated GFR


(Cockcroft-Gault) 


  55.2 


  


  


 


 


Glucose Level


  


  271mg/dL


(70-99) 


  


 


 


Calcium Level


  


  9.0mg/dL


(8.5-10.1) 


  


 


 


Glucose (Fingerstick)


  


  


  277mg/dL


(70-99) 256mg/dL


(70-99)














Test


  3/15/17


16:49 3/15/17


20:58 3/16/17


03:52 3/16/17


08:22


 


Glucose (Fingerstick)


  221mg/dL


(70-99) 178mg/dL


(70-99) 


  149mg/dL


(70-99)


 


Sodium Level


  


  


  141mmol/L


(136-145) 


 


 


Potassium Level


  


  


  4.2mmol/L


(3.5-5.1) 


 


 


Chloride Level


  


  


  103mmol/L


() 


 


 


Carbon Dioxide Level


  


  


  30mmol/L


(21-32) 


 


 


Anion Gap   8 (6-14)  


 


Blood Urea Nitrogen   29mg/dL (8-26)  


 


Creatinine


  


  


  1.4mg/dL


(0.7-1.3) 


 


 


Estimated GFR


(Cockcroft-Gault) 


  


  50.7 


  


 


 


Glucose Level


  


  


  166mg/dL


(70-99) 


 


 


Calcium Level


  


  


  8.9mg/dL


(8.5-10.1) 


 








Laboratory Tests








Test


  3/15/17


11:49 3/15/17


16:49 3/15/17


20:58 3/16/17


03:52


 


Glucose (Fingerstick)


  256mg/dL


(70-99) 221mg/dL


(70-99) 178mg/dL


(70-99) 


 


 


Sodium Level


  


  


  


  141mmol/L


(136-145)


 


Potassium Level


  


  


  


  4.2mmol/L


(3.5-5.1)


 


Chloride Level


  


  


  


  103mmol/L


()


 


Carbon Dioxide Level


  


  


  


  30mmol/L


(21-32)


 


Anion Gap    8 (6-14) 


 


Blood Urea Nitrogen    29mg/dL (8-26) 


 


Creatinine


  


  


  


  1.4mg/dL


(0.7-1.3)


 


Estimated GFR


(Cockcroft-Gault) 


  


  


  50.7 


 


 


Glucose Level


  


  


  


  166mg/dL


(70-99)


 


Calcium Level


  


  


  


  8.9mg/dL


(8.5-10.1)














Test


  3/16/17


08:22 


  


  


 


 


Glucose (Fingerstick)


  149mg/dL


(70-99) 


  


  


 








Medications





Active Scripts








 Medications  Dose


 Route/Sig Days Date Category


 


 Stiolto Respimat


 Inhal Spray


  (Tiotropium


 Br/Olodaterol


 HCl) 4 Gm


 Mist.inhal  4 Gm


 IH DAILY   3/14/17 Reported


 


 Novolog Flexpen


  (Insulin Aspart)


 100 Unit/1 Ml


 Insuln.pen  1 Unit


 SQ   3/14/17 Reported


 


 Ventolin Hfa


 Inhaler


  (Albuterol


 Sulfate) 18 Gm


 Hfa.aer.ad  2 Puff


 INH QID   3/14/17 Reported


 


 Flovent 110MCG


 Hfa (Fluticasone


 Propionate) 12 Gm


 Aer.w.adap  2 Puff


 IH BID   3/14/17 Reported


 


 Losartan


 Potassium 25 Mg


 Tablet  100 Mg


 PO DAILY   3/14/17 Reported


 


 Levemir (Insulin


 Detemir) 100


 Unit/1 Ml Vial  70 Unit


 SQ QHS   3/14/17 Reported











Impression


.





1.  Acute respiratory failure, multifactorial secondary to acute suspect


systolic heart failure, possibly diastolic and mild acute exacerbation of


chronic obstructive pulmonary disease.


2.  Acute exacerbation of chronic obstructive pulmonary disease.


3.  Diabetes.


4.  Hypertension.


5.  Elevated troponin level.











echo





Left ventricle systolic function is mildly impaired


The Ejection Fraction is 45%.


The right ventricular systolic function is mildly impaired


The left atrium size is normal.


The right atrium size is normal.


The aortic valve is trileaflet.


The aortic valve is mildly thickened.


The mitral valve leaflets are thickened.


Doppler and Color Flow revealed no mitral valve regurgitation noted.


Doppler and Color Flow revealed trace tricuspid regurgitation.


Unable to accurately estimate the PA pressure


The pulmonary valve is normal in structure and function.


There is no evidence of significant pericardial effusion.





Plan


.


PLAN:


1.  Continue oxygen supplementation.


2.  Echocardiogram pending.


3.  Diurese.


4.  No need for antibiotics or steroids.


5.  Nebulized treatments.


6.  Continue home oxygen.








BRITANY VALADEZ MD Mar 16, 2017 11:07

## 2017-03-16 NOTE — CONS
DATE OF CONSULTATION:  03/15/2017



ATTENDING PHYSICIAN:  Dr. Freddie Rodriguez.



REASON FOR CONSULTATION:  The patient seen in pulmonary consultation at the

request of Dr. Rodriguez for acute respiratory failure requiring noninvasive

ventilation.



HISTORY OF PRESENT ILLNESS:  The patient is a 66-year-old male with a history of

COPD with an asthma component and normally utilizes Ventolin on a p.r.n. basis. 

He is currently nonsmoking, was actually seen in the office yesterday with Dr. Rodriguez.  He was evaluated 6-minute walk revealed that he required oxygen

supplementation.  The patient went home and had increasing shortness of breath. 

He was exposed to some perfume that his wife was wearing in addition, he was

exposed to some fumes from pumping gas.  He had increasing shortness of breath,

was admitted.  Chest x-ray was obtained.  I reviewed the x-ray, which revealed

vascular congestion.  The patient currently denies chest pain or pressure.  He

has never had congestive heart failure.  An echocardiogram is currently pending.



In the past week or so, he has had a cough, mostly nonproductive, wheezing and

increasing shortness of breath.  No pedal edema, but some paroxysmal nocturnal

dyspnea.



I queried the patient about the possibility of obstructive sleep apnea.  He

denies any excessive daytime sleepiness.  He does not know, if he snores.  He

denies ____ his sleep.



PAST MEDICAL HISTORY:  COPD with an asthma component, type 2 diabetes,

hypertension.



PAST SURGICAL HISTORY:  Right shoulder surgery.



REVIEW OF SYSTEMS:  As indicated above, otherwise, a 10-point system was

reviewed and negative.



HOME MEDICATIONS:  List was reviewed.



CURRENT MEDICATION:  List was reviewed.



ALLERGIES:  No known drug allergies.



FAMILY HISTORY:  Noncontributory.



PHYSICAL EXAMINATION:

GENERAL:  The patient was in obese individual with a body mass seems index of

31.4.

VITAL SIGNS:  He is afebrile, currently on 2 liters of oxygen supplementation

off BiPAP.

HEENT:  Eyes, the sclerae were nonicteric.

NECK:  Jugular venous distention could not be assessed secondary to body

habitus.

CHEST:  Full expansion.

LUNGS:  Adequate airway flow, no wheezes, rales or rhonchi.

CARDIOVASCULAR:  Regular rate and rhythm with S1, S2, no S3.

ABDOMEN:  Soft and tender, obese.

EXTREMITIES:  No clubbing, cyanosis, some edema.

NEUROLOGIC:  The patient was awake, alert, following commands.  A detailed neuro

exam was not performed.



LABORATORY DATA:  Arterial blood gas was noted.  White count was slightly

elevated.  Hemoglobin and hematocrit noted.  Electrolytes were noted.  BUN and

creatinine were normal.  Troponin level was elevated.  BNP was elevated.  Chest

x-ray revealed increased vascular congestion.



IMPRESSION:

1.  Acute respiratory failure, multifactorial secondary to acute suspect

systolic heart failure, possibly diastolic and mild acute exacerbation of

chronic obstructive pulmonary disease.

2.  Acute exacerbation of chronic obstructive pulmonary disease.

3.  Diabetes.

4.  Hypertension.

5.  Elevated troponin level.



PLAN:

1.  Continue oxygen supplementation.

2.  Echocardiogram pending.

3.  Diurese.

4.  No need for antibiotics or steroids.

5.  Nebulized treatments.

6.  Continue home oxygen.



I do appreciate the privilege in sharing the patient's care.

 



______________________________

BRITANY VALADEZ MD



DR:  [email or web address]/luan  JOB#:  [id number] / [id number]

DD:  03/15/2017 15:14  DT:  03/16/2017 02:52

## 2017-04-17 ENCOUNTER — HOSPITAL ENCOUNTER (INPATIENT)
Dept: HOSPITAL 61 - ER | Age: 67
LOS: 6 days | Discharge: HOME | DRG: 248 | End: 2017-04-23
Attending: FAMILY MEDICINE | Admitting: FAMILY MEDICINE
Payer: COMMERCIAL

## 2017-04-17 VITALS — DIASTOLIC BLOOD PRESSURE: 80 MMHG | SYSTOLIC BLOOD PRESSURE: 155 MMHG

## 2017-04-17 VITALS — HEIGHT: 69 IN | BODY MASS INDEX: 33.77 KG/M2 | WEIGHT: 228 LBS

## 2017-04-17 VITALS — SYSTOLIC BLOOD PRESSURE: 161 MMHG | DIASTOLIC BLOOD PRESSURE: 78 MMHG

## 2017-04-17 VITALS — SYSTOLIC BLOOD PRESSURE: 154 MMHG | DIASTOLIC BLOOD PRESSURE: 91 MMHG

## 2017-04-17 VITALS — DIASTOLIC BLOOD PRESSURE: 79 MMHG | SYSTOLIC BLOOD PRESSURE: 166 MMHG

## 2017-04-17 VITALS — SYSTOLIC BLOOD PRESSURE: 143 MMHG | DIASTOLIC BLOOD PRESSURE: 91 MMHG

## 2017-04-17 VITALS — SYSTOLIC BLOOD PRESSURE: 147 MMHG | DIASTOLIC BLOOD PRESSURE: 45 MMHG

## 2017-04-17 VITALS — SYSTOLIC BLOOD PRESSURE: 154 MMHG | DIASTOLIC BLOOD PRESSURE: 76 MMHG

## 2017-04-17 VITALS — SYSTOLIC BLOOD PRESSURE: 147 MMHG | DIASTOLIC BLOOD PRESSURE: 92 MMHG

## 2017-04-17 VITALS — SYSTOLIC BLOOD PRESSURE: 154 MMHG | DIASTOLIC BLOOD PRESSURE: 94 MMHG

## 2017-04-17 VITALS — DIASTOLIC BLOOD PRESSURE: 79 MMHG | SYSTOLIC BLOOD PRESSURE: 153 MMHG

## 2017-04-17 DIAGNOSIS — M19.90: ICD-10-CM

## 2017-04-17 DIAGNOSIS — J96.21: ICD-10-CM

## 2017-04-17 DIAGNOSIS — I50.23: ICD-10-CM

## 2017-04-17 DIAGNOSIS — Z79.4: ICD-10-CM

## 2017-04-17 DIAGNOSIS — I48.91: ICD-10-CM

## 2017-04-17 DIAGNOSIS — K76.1: ICD-10-CM

## 2017-04-17 DIAGNOSIS — I21.4: Primary | ICD-10-CM

## 2017-04-17 DIAGNOSIS — N18.9: ICD-10-CM

## 2017-04-17 DIAGNOSIS — E78.5: ICD-10-CM

## 2017-04-17 DIAGNOSIS — J44.1: ICD-10-CM

## 2017-04-17 DIAGNOSIS — D72.829: ICD-10-CM

## 2017-04-17 DIAGNOSIS — I13.0: ICD-10-CM

## 2017-04-17 DIAGNOSIS — Z99.81: ICD-10-CM

## 2017-04-17 DIAGNOSIS — I50.43: ICD-10-CM

## 2017-04-17 DIAGNOSIS — I25.5: ICD-10-CM

## 2017-04-17 DIAGNOSIS — Z95.5: ICD-10-CM

## 2017-04-17 DIAGNOSIS — I27.2: ICD-10-CM

## 2017-04-17 DIAGNOSIS — E11.22: ICD-10-CM

## 2017-04-17 DIAGNOSIS — Z87.891: ICD-10-CM

## 2017-04-17 DIAGNOSIS — I25.10: ICD-10-CM

## 2017-04-17 DIAGNOSIS — R91.1: ICD-10-CM

## 2017-04-17 DIAGNOSIS — N17.9: ICD-10-CM

## 2017-04-17 LAB
ALBUMIN SERPL-MCNC: 3.8 G/DL (ref 3.4–5)
ALBUMIN/GLOB SERPL: 1 {RATIO} (ref 1–1.7)
ALP SERPL-CCNC: 86 U/L (ref 46–116)
ALT SERPL-CCNC: 52 U/L (ref 16–63)
ANION GAP SERPL CALC-SCNC: 11 MMOL/L (ref 6–14)
AST SERPL-CCNC: 40 U/L (ref 15–37)
BACTERIA #/AREA URNS HPF: 0 /HPF
BASOPHILS # BLD AUTO: 0.1 X10^3/UL (ref 0–0.2)
BASOPHILS NFR BLD: 0 % (ref 0–3)
BILIRUB SERPL-MCNC: 0.8 MG/DL (ref 0.2–1)
BILIRUB UR QL STRIP: NEGATIVE
BUN SERPL-MCNC: 22 MG/DL (ref 8–26)
BUN/CREAT SERPL: 17 (ref 6–20)
CALCIUM SERPL-MCNC: 8.5 MG/DL (ref 8.5–10.1)
CHLORIDE SERPL-SCNC: 103 MMOL/L (ref 98–107)
CK SERPL-CCNC: 560 U/L (ref 39–308)
CKMB INDEX: 5 % (ref 0–4)
CKMB MASS: 28 NG/ML (ref 0–3.6)
CO2 SERPL-SCNC: 28 MMOL/L (ref 21–32)
CREAT SERPL-MCNC: 1.3 MG/DL (ref 0.7–1.3)
EOSINOPHIL NFR BLD: 4 % (ref 0–3)
ERYTHROCYTE [DISTWIDTH] IN BLOOD BY AUTOMATED COUNT: 15.1 % (ref 11.5–14.5)
GFR SERPLBLD BASED ON 1.73 SQ M-ARVRAT: 55.2 ML/MIN
GLOBULIN SER-MCNC: 3.7 G/DL (ref 2.2–3.8)
GLUCOSE SERPL-MCNC: 257 MG/DL (ref 70–99)
GLUCOSE UR STRIP-MCNC: 100 MG/DL
HCO3 BLDA-SCNC: 27 MMOL/L (ref 21–28)
HCT VFR BLD CALC: 41.9 % (ref 39–53)
HGB BLD-MCNC: 13.1 G/DL (ref 13–17.5)
INSPIRATION SETTING TIME VENT: 35
LYMPHOCYTES # BLD: 2.7 X10^3/UL (ref 1–4.8)
LYMPHOCYTES NFR BLD AUTO: 18 % (ref 24–48)
MCH RBC QN AUTO: 26 PG (ref 25–35)
MCHC RBC AUTO-ENTMCNC: 31 G/DL (ref 31–37)
MCV RBC AUTO: 84 FL (ref 79–100)
MONOCYTES NFR BLD: 12 % (ref 0–9)
NEUTROPHILS NFR BLD AUTO: 67 % (ref 31–73)
NITRITE UR QL STRIP: NEGATIVE
PCO2 BLDA: 49 MMHG (ref 35–46)
PH ABG: 7.36 (ref 7.35–7.45)
PH UR STRIP: 5 [PH]
PLATELET # BLD AUTO: 252 X10^3/UL (ref 140–400)
PO2 BLDA: 91 MMHG (ref 65–108)
POTASSIUM SERPL-SCNC: 4.9 MMOL/L (ref 3.5–5.1)
PROT SERPL-MCNC: 7.5 G/DL (ref 6.4–8.2)
PROT UR STRIP-MCNC: 30 MG/DL
RBC # BLD AUTO: 4.99 X10^6/UL (ref 4.3–5.7)
RBC #/AREA URNS HPF: 0 /HPF (ref 0–2)
SAO2 % BLDA: 96 % (ref 92–99)
SODIUM SERPL-SCNC: 142 MMOL/L (ref 136–145)
SP GR UR STRIP: 1.02
SQUAMOUS #/AREA URNS LPF: (no result) /LPF
UROBILINOGEN UR-MCNC: 0.2 MG/DL
WBC # BLD AUTO: 15 X10^3/UL (ref 4–11)
WBC #/AREA URNS HPF: (no result) /HPF (ref 0–4)

## 2017-04-17 PROCEDURE — 80048 BASIC METABOLIC PNL TOTAL CA: CPT

## 2017-04-17 PROCEDURE — 82947 ASSAY GLUCOSE BLOOD QUANT: CPT

## 2017-04-17 PROCEDURE — 92928 PRQ TCAT PLMT NTRAC ST 1 LES: CPT

## 2017-04-17 PROCEDURE — 85610 PROTHROMBIN TIME: CPT

## 2017-04-17 PROCEDURE — 93005 ELECTROCARDIOGRAM TRACING: CPT

## 2017-04-17 PROCEDURE — 84484 ASSAY OF TROPONIN QUANT: CPT

## 2017-04-17 PROCEDURE — 33968 REMOVE AORTIC ASSIST DEVICE: CPT

## 2017-04-17 PROCEDURE — 83605 ASSAY OF LACTIC ACID: CPT

## 2017-04-17 PROCEDURE — 96376 TX/PRO/DX INJ SAME DRUG ADON: CPT

## 2017-04-17 PROCEDURE — 82553 CREATINE MB FRACTION: CPT

## 2017-04-17 PROCEDURE — C1725 CATH, TRANSLUMIN NON-LASER: HCPCS

## 2017-04-17 PROCEDURE — C1773 RET DEV, INSERTABLE: HCPCS

## 2017-04-17 PROCEDURE — 85027 COMPLETE CBC AUTOMATED: CPT

## 2017-04-17 PROCEDURE — C1769 GUIDE WIRE: HCPCS

## 2017-04-17 PROCEDURE — 80061 LIPID PANEL: CPT

## 2017-04-17 PROCEDURE — 93460 R&L HRT ART/VENTRICLE ANGIO: CPT

## 2017-04-17 PROCEDURE — 93880 EXTRACRANIAL BILAT STUDY: CPT

## 2017-04-17 PROCEDURE — 93970 EXTREMITY STUDY: CPT

## 2017-04-17 PROCEDURE — 87040 BLOOD CULTURE FOR BACTERIA: CPT

## 2017-04-17 PROCEDURE — C1771 REP DEV, URINARY, W/SLING: HCPCS

## 2017-04-17 PROCEDURE — 85347 COAGULATION TIME ACTIVATED: CPT

## 2017-04-17 PROCEDURE — 81001 URINALYSIS AUTO W/SCOPE: CPT

## 2017-04-17 PROCEDURE — 93308 TTE F-UP OR LMTD: CPT

## 2017-04-17 PROCEDURE — C1892 INTRO/SHEATH,FIXED,PEEL-AWAY: HCPCS

## 2017-04-17 PROCEDURE — 83036 HEMOGLOBIN GLYCOSYLATED A1C: CPT

## 2017-04-17 PROCEDURE — 33967 INSERT I-AORT PERCUT DEVICE: CPT

## 2017-04-17 PROCEDURE — A9505 TL201 THALLIUM: HCPCS

## 2017-04-17 PROCEDURE — 71010: CPT

## 2017-04-17 PROCEDURE — 94660 CPAP INITIATION&MGMT: CPT

## 2017-04-17 PROCEDURE — 87641 MR-STAPH DNA AMP PROBE: CPT

## 2017-04-17 PROCEDURE — 71250 CT THORAX DX C-: CPT

## 2017-04-17 PROCEDURE — G0269 OCCLUSIVE DEVICE IN VEIN ART: HCPCS

## 2017-04-17 PROCEDURE — 78452 HT MUSCLE IMAGE SPECT MULT: CPT

## 2017-04-17 PROCEDURE — 94640 AIRWAY INHALATION TREATMENT: CPT

## 2017-04-17 PROCEDURE — 85730 THROMBOPLASTIN TIME PARTIAL: CPT

## 2017-04-17 PROCEDURE — 93306 TTE W/DOPPLER COMPLETE: CPT

## 2017-04-17 PROCEDURE — 94250: CPT

## 2017-04-17 PROCEDURE — 94010 BREATHING CAPACITY TEST: CPT

## 2017-04-17 PROCEDURE — 5A09457 ASSISTANCE WITH RESPIRATORY VENTILATION, 24-96 CONSECUTIVE HOURS, CONTINUOUS POSITIVE AIRWAY PRESSURE: ICD-10-PCS | Performed by: FAMILY MEDICINE

## 2017-04-17 PROCEDURE — 96374 THER/PROPH/DIAG INJ IV PUSH: CPT

## 2017-04-17 PROCEDURE — 36600 WITHDRAWAL OF ARTERIAL BLOOD: CPT

## 2017-04-17 PROCEDURE — 96361 HYDRATE IV INFUSION ADD-ON: CPT

## 2017-04-17 PROCEDURE — 82805 BLOOD GASES W/O2 SATURATION: CPT

## 2017-04-17 PROCEDURE — 80053 COMPREHEN METABOLIC PANEL: CPT

## 2017-04-17 PROCEDURE — C1887 CATHETER, GUIDING: HCPCS

## 2017-04-17 PROCEDURE — 83880 ASSAY OF NATRIURETIC PEPTIDE: CPT

## 2017-04-17 PROCEDURE — 36415 COLL VENOUS BLD VENIPUNCTURE: CPT

## 2017-04-17 PROCEDURE — C1876 STENT, NON-COA/NON-COV W/DEL: HCPCS

## 2017-04-17 PROCEDURE — 94760 N-INVAS EAR/PLS OXIMETRY 1: CPT

## 2017-04-17 RX ADMIN — FUROSEMIDE SCH MG: 80 TABLET ORAL at 21:59

## 2017-04-17 RX ADMIN — ACETAMINOPHEN PRN MG: 325 TABLET, FILM COATED ORAL at 13:12

## 2017-04-17 RX ADMIN — FUROSEMIDE SCH MG: 20 TABLET ORAL at 10:04

## 2017-04-17 RX ADMIN — NITROGLYCERIN PRN MLS/HR: 20 INJECTION INTRAVENOUS at 05:15

## 2017-04-17 RX ADMIN — INSULIN DETEMIR SCH UNITS: 100 INJECTION, SOLUTION SUBCUTANEOUS at 22:03

## 2017-04-17 RX ADMIN — INSULIN ASPART SCH UNITS: 100 INJECTION, SOLUTION INTRAVENOUS; SUBCUTANEOUS at 17:00

## 2017-04-17 RX ADMIN — IPRATROPIUM BROMIDE AND ALBUTEROL SULFATE SCH ML: .5; 3 SOLUTION RESPIRATORY (INHALATION) at 19:42

## 2017-04-17 RX ADMIN — ENOXAPARIN SODIUM SCH MG: 80 INJECTION SUBCUTANEOUS at 16:14

## 2017-04-17 RX ADMIN — ACETAMINOPHEN PRN MG: 325 TABLET, FILM COATED ORAL at 20:35

## 2017-04-17 RX ADMIN — INSULIN ASPART SCH UNITS: 100 INJECTION, SOLUTION INTRAVENOUS; SUBCUTANEOUS at 17:50

## 2017-04-17 RX ADMIN — INSULIN ASPART SCH UNITS: 100 INJECTION, SOLUTION INTRAVENOUS; SUBCUTANEOUS at 11:56

## 2017-04-17 RX ADMIN — NITROGLYCERIN PRN MLS/HR: 20 INJECTION INTRAVENOUS at 11:51

## 2017-04-17 RX ADMIN — INSULIN ASPART SCH UNITS: 100 INJECTION, SOLUTION INTRAVENOUS; SUBCUTANEOUS at 12:00

## 2017-04-17 NOTE — CARD
--------------- APPROVED REPORT --------------





EXAM: Two-dimensional and M-mode echocardiogram with Doppler and color Doppler.



Other Information 

Quality : GoodHR: 90bpm

Rhythm : Irregular



INDICATION

For left ventricular ejection fraction evaluation only



2D DIMENSIONS 

IVSd1.0 (0.7-1.1cm)LVDd5.4 (3.9-5.9cm)

PWd1.0 (0.7-1.1cm)LVDs2.7 (2.5-4.0cm)

FS (%) 50.5 %.9 ml

LVEF(%)45.0 (>50%)



 LEFT VENTRICLE 

The left ventricle is normal size. There is normal left ventricular wall thickness. Left ventricle sy
stolic function is mildly impaired. The Ejection Fraction is 45 %. There is mild global hypokinesis o
f the left ventricle. Limited echo, left ventricular diastolic function was not assessed.



 RIGHT VENTRICLE 

The RV was not assessed.



 ATRIA 

The left and right atria were not assessed.



 GREAT VESSELS 

The aortic root is normal in size. The ascending aorta is normal in size.



 PERICARDIAL EFFUSION 

There is no evidence of significant pericardial effusion.



Critical Notification

Critical Value: No



<Conclusion>

Left ventricle systolic function is mildly impaired.

The Ejection Fraction is 45 %.

There is mild global hypokinesis of the left ventricle.

Limited echo, left ventricular diastolic function was not assessed.

## 2017-04-17 NOTE — EKG
[facility]

               [address] [place] [address]

Test Date:    2017               Test Time:    05:12:40

Pat Name:     ANISA TYLER              Department:   

Patient ID:   [id number]           Room:         114 1

Gender:       M                        Technician:   

:          [id number]               Requested By: CON VILLATORO

Order Number: [id number]            Reading MD:   Elva Montemayor

                                 Measurements

Intervals                              Axis          

Rate:         97                       P:            90

NJ:           210                      QRS:          -49

QRSD:         116                      T:            92

QT:           348                                    

QTc:          446                                    

                           Interpretive Statements

SINUS RHYTHM

PROLONGED [place] INTERVAL

ABNORMAL LEFT AXIS DEVIATION

QRS(T) CONTOUR ABNORMALITY

CONSISTENT WITH ANTEROSEPTAL INFARCT

PROBABLY OLD

T ABNORMALITY IN HIGH LATERAL LEADS



Electronically Signed On 2017 12:53:32 CDT by Elva Montemayor

## 2017-04-17 NOTE — PDOC
PULMONARY PROGRESS NOTES


Vitals





 Vital Signs








  Date Time  Temp Pulse Resp B/P Pulse Ox O2 Delivery O2 Flow Rate FiO2


 


4/17/17 13:00  83 22 147/92 97  5.0 


 


4/17/17 12:00 98.0     Nasal Cannula  





 98.0       








Labs





Laboratory Tests








Test


  4/17/17


05:05 4/17/17


05:35 4/17/17


06:20 4/17/17


09:05


 


White Blood Count


  15.0x10^3/uL


(4.0-11.0) 


  


  


 


 


Red Blood Count


  4.99x10^6/uL


(4.30-5.70) 


  


  


 


 


Hemoglobin


  13.1g/dL


(13.0-17.5) 


  


  


 


 


Hematocrit


  41.9%


(39.0-53.0) 


  


  


 


 


Mean Corpuscular Volume 84fL ()    


 


Mean Corpuscular Hemoglobin 26pg (25-35)    


 


Mean Corpuscular Hemoglobin


Concent 31g/dL (31-37) 


  


  


  


 


 


Red Cell Distribution Width


  15.1%


(11.5-14.5) 


  


  


 


 


Platelet Count


  252x10^3/uL


(140-400) 


  


  


 


 


Neutrophils (%) (Auto) 67% (31-73)    


 


Lymphocytes (%) (Auto) 18% (24-48)    


 


Monocytes (%) (Auto) 12% (0-9)    


 


Eosinophils (%) (Auto) 4% (0-3)    


 


Basophils (%) (Auto) 0% (0-3)    


 


Neutrophils # (Auto)


  10.0x10^3uL


(1.8-7.7) 


  


  


 


 


Lymphocytes # (Auto)


  2.7x10^3/uL


(1.0-4.8) 


  


  


 


 


Monocytes # (Auto)


  1.7x10^3/uL


(0.0-1.1) 


  


  


 


 


Eosinophils # (Auto)


  0.5x10^3/uL


(0.0-0.7) 


  


  


 


 


Basophils # (Auto)


  0.1x10^3/uL


(0.0-0.2) 


  


  


 


 


Sodium Level


  142mmol/L


(136-145) 


  


  


 


 


Potassium Level


  4.9mmol/L


(3.5-5.1) 


  


  


 


 


Chloride Level


  103mmol/L


() 


  


  


 


 


Carbon Dioxide Level


  28mmol/L


(21-32) 


  


  


 


 


Anion Gap 11 (6-14)    


 


Blood Urea Nitrogen 22mg/dL (8-26)    


 


Creatinine


  1.3mg/dL


(0.7-1.3) 


  


  


 


 


Estimated GFR


(Cockcroft-Gault) 55.2 


  


  


  


 


 


BUN/Creatinine Ratio 17 (6-20)    


 


Glucose Level


  257mg/dL


(70-99) 


  


  


 


 


Lactic Acid Level


  3.9mmol/L


(0.4-2.0) 


  


  2.2mmol/L


(0.4-2.0)


 


Calcium Level


  8.5mg/dL


(8.5-10.1) 


  


  


 


 


Total Bilirubin


  0.8mg/dL


(0.2-1.0) 


  


  


 


 


Aspartate Amino Transf


(AST/SGOT) 40U/L (15-37) 


  


  


  


 


 


Alanine Aminotransferase


(ALT/SGPT) 52U/L (16-63) 


  


  


  


 


 


Alkaline Phosphatase 86U/L ()    


 


Creatine Kinase


  560U/L


() 


  


  


 


 


Creatine Kinase MB (Mass)


  28.0ng/mL


(0.0-3.6) 


  


  


 


 


Creatine Kinase MB Relative


Index 5.0% (0-4) 


  


  


  


 


 


Troponin I Quantitative


  1.437ng/mL


(0.000-0.055) 


  


  


 


 


NT-Pro-B-Type Natriuretic


Peptide 2255pg/mL


(0-124) 


  


  


 


 


Total Protein


  7.5g/dL


(6.4-8.2) 


  


  


 


 


Albumin


  3.8g/dL


(3.4-5.0) 


  


  


 


 


Albumin/Globulin Ratio 1.0 (1.0-1.7)    


 


Urine Collection Type  Unknown   


 


Urine Color  Yellow   


 


Urine Clarity  Clear   


 


Urine pH  5.0   


 


Urine Specific Gravity  1.025   


 


Urine Protein


  


  30mg/dL


(NEG-TRACE) 


  


 


 


Urine Glucose (UA)  100mg/dL (NEG)   


 


Urine Ketones (Stick)


  


  Negativemg/dL


(NEG) 


  


 


 


Urine Blood  Negative (NEG)   


 


Urine Nitrite  Negative (NEG)   


 


Urine Bilirubin  Negative (NEG)   


 


Urine Urobilinogen Dipstick


  


  0.2mg/dL (0.2


mg/dL) 


  


 


 


Urine Leukocyte Esterase  Negative (NEG)   


 


Urine RBC  0/HPF (0-2)   


 


Urine WBC  Occ/HPF (0-4)   


 


Urine Squamous Epithelial


Cells 


  Few/LPF 


  


  


 


 


Urine Bacteria  0/HPF (0-FEW)   


 


Urine Hyaline Casts  Occasional/HPF   


 


Urine Mucus  Mod/LPF   


 


O2 Saturation   96% (92-99)  


 


Arterial Blood pH


  


  


  7.36


(7.35-7.45) 


 


 


Arterial Blood pCO2 at


Patient Temp 


  


  49mmHg (35-46) 


  


 


 


Arterial Blood pO2 at Patient


Temp 


  


  91mmHg


() 


 


 


Arterial Blood HCO3


  


  


  27mmol/L


(21-28) 


 


 


Arterial Blood Base Excess   1mmol/L (-3-3)  


 


FiO2   35.0  














Test


  4/17/17


10:50 4/17/17


11:49 


  


 


 


Troponin I Quantitative


  4.505ng/mL


(0.000-0.055) 


  


  


 


 


Glucose (Fingerstick)


  


  157mg/dL


(70-99) 


  


 








Laboratory Tests








Test


  4/17/17


05:05 4/17/17


05:35 4/17/17


06:20 4/17/17


09:05


 


White Blood Count


  15.0x10^3/uL


(4.0-11.0) 


  


  


 


 


Red Blood Count


  4.99x10^6/uL


(4.30-5.70) 


  


  


 


 


Hemoglobin


  13.1g/dL


(13.0-17.5) 


  


  


 


 


Hematocrit


  41.9%


(39.0-53.0) 


  


  


 


 


Mean Corpuscular Volume 84fL ()    


 


Mean Corpuscular Hemoglobin 26pg (25-35)    


 


Mean Corpuscular Hemoglobin


Concent 31g/dL (31-37) 


  


  


  


 


 


Red Cell Distribution Width


  15.1%


(11.5-14.5) 


  


  


 


 


Platelet Count


  252x10^3/uL


(140-400) 


  


  


 


 


Neutrophils (%) (Auto) 67% (31-73)    


 


Lymphocytes (%) (Auto) 18% (24-48)    


 


Monocytes (%) (Auto) 12% (0-9)    


 


Eosinophils (%) (Auto) 4% (0-3)    


 


Basophils (%) (Auto) 0% (0-3)    


 


Neutrophils # (Auto)


  10.0x10^3uL


(1.8-7.7) 


  


  


 


 


Lymphocytes # (Auto)


  2.7x10^3/uL


(1.0-4.8) 


  


  


 


 


Monocytes # (Auto)


  1.7x10^3/uL


(0.0-1.1) 


  


  


 


 


Eosinophils # (Auto)


  0.5x10^3/uL


(0.0-0.7) 


  


  


 


 


Basophils # (Auto)


  0.1x10^3/uL


(0.0-0.2) 


  


  


 


 


Sodium Level


  142mmol/L


(136-145) 


  


  


 


 


Potassium Level


  4.9mmol/L


(3.5-5.1) 


  


  


 


 


Chloride Level


  103mmol/L


() 


  


  


 


 


Carbon Dioxide Level


  28mmol/L


(21-32) 


  


  


 


 


Anion Gap 11 (6-14)    


 


Blood Urea Nitrogen 22mg/dL (8-26)    


 


Creatinine


  1.3mg/dL


(0.7-1.3) 


  


  


 


 


Estimated GFR


(Cockcroft-Gault) 55.2 


  


  


  


 


 


BUN/Creatinine Ratio 17 (6-20)    


 


Glucose Level


  257mg/dL


(70-99) 


  


  


 


 


Lactic Acid Level


  3.9mmol/L


(0.4-2.0) 


  


  2.2mmol/L


(0.4-2.0)


 


Calcium Level


  8.5mg/dL


(8.5-10.1) 


  


  


 


 


Total Bilirubin


  0.8mg/dL


(0.2-1.0) 


  


  


 


 


Aspartate Amino Transf


(AST/SGOT) 40U/L (15-37) 


  


  


  


 


 


Alanine Aminotransferase


(ALT/SGPT) 52U/L (16-63) 


  


  


  


 


 


Alkaline Phosphatase 86U/L ()    


 


Creatine Kinase


  560U/L


() 


  


  


 


 


Creatine Kinase MB (Mass)


  28.0ng/mL


(0.0-3.6) 


  


  


 


 


Creatine Kinase MB Relative


Index 5.0% (0-4) 


  


  


  


 


 


Troponin I Quantitative


  1.437ng/mL


(0.000-0.055) 


  


  


 


 


NT-Pro-B-Type Natriuretic


Peptide 2255pg/mL


(0-124) 


  


  


 


 


Total Protein


  7.5g/dL


(6.4-8.2) 


  


  


 


 


Albumin


  3.8g/dL


(3.4-5.0) 


  


  


 


 


Albumin/Globulin Ratio 1.0 (1.0-1.7)    


 


Urine Collection Type  Unknown   


 


Urine Color  Yellow   


 


Urine Clarity  Clear   


 


Urine pH  5.0   


 


Urine Specific Gravity  1.025   


 


Urine Protein


  


  30mg/dL


(NEG-TRACE) 


  


 


 


Urine Glucose (UA)  100mg/dL (NEG)   


 


Urine Ketones (Stick)


  


  Negativemg/dL


(NEG) 


  


 


 


Urine Blood  Negative (NEG)   


 


Urine Nitrite  Negative (NEG)   


 


Urine Bilirubin  Negative (NEG)   


 


Urine Urobilinogen Dipstick


  


  0.2mg/dL (0.2


mg/dL) 


  


 


 


Urine Leukocyte Esterase  Negative (NEG)   


 


Urine RBC  0/HPF (0-2)   


 


Urine WBC  Occ/HPF (0-4)   


 


Urine Squamous Epithelial


Cells 


  Few/LPF 


  


  


 


 


Urine Bacteria  0/HPF (0-FEW)   


 


Urine Hyaline Casts  Occasional/HPF   


 


Urine Mucus  Mod/LPF   


 


O2 Saturation   96% (92-99)  


 


Arterial Blood pH


  


  


  7.36


(7.35-7.45) 


 


 


Arterial Blood pCO2 at


Patient Temp 


  


  49mmHg (35-46) 


  


 


 


Arterial Blood pO2 at Patient


Temp 


  


  91mmHg


() 


 


 


Arterial Blood HCO3


  


  


  27mmol/L


(21-28) 


 


 


Arterial Blood Base Excess   1mmol/L (-3-3)  


 


FiO2   35.0  














Test


  4/17/17


10:50 4/17/17


11:49 


  


 


 


Troponin I Quantitative


  4.505ng/mL


(0.000-0.055) 


  


  


 


 


Glucose (Fingerstick)


  


  157mg/dL


(70-99) 


  


 








Medications





Active Scripts








 Medications  Dose


 Route/Sig Days Date Category


 


 Stiolto Respimat


 Inhal Spray


  (Tiotropium


 Br/Olodaterol


 HCl) 4 Gm


 Mist.inhal  4 Gm


 IH DAILY   3/14/17 Reported


 


 Novolog Flexpen


  (Insulin Aspart)


 100 Unit/1 Ml


 Insuln.pen  1 Unit


 SQ   3/14/17 Reported


 


 Ventolin Hfa


 Inhaler


  (Albuterol


 Sulfate) 18 Gm


 Hfa.aer.ad  2 Puff


 INH QID   3/14/17 Reported


 


 Flovent 110MCG


 Hfa (Fluticasone


 Propionate) 12 Gm


 Aer.w.adap  2 Puff


 IH BID   3/14/17 Reported


 


 Losartan


 Potassium 25 Mg


 Tablet  100 Mg


 PO DAILY   3/14/17 Reported


 


 Levemir (Insulin


 Detemir) 100


 Unit/1 Ml Vial  70 Unit


 SQ QHS   3/14/17 Reported











Impression


.


FULL CONSULT DICTATED


THANKS


A/C RESP FAILURE


A/C CHF


OK TO TRANSFER








BRITANY VALADEZ MD Apr 17, 2017 14:17

## 2017-04-17 NOTE — PHYS DOC
Past Medical History


Past Medical History:  Asthma, COPD, Diabetes-Type II, High Cholesterol, 

Hypertension


Past Surgical History:  Other


Additional Past Surgical Histo:  Right shoulder


Alcohol Use:  None


Drug Use:  None





Adult General


Chief Complaint


Chief Complaint:  SHORTNESS OF BREATH





HPI


HPI


Patient is a 66  year old male who presents with complaint of severe shortness 

of breath. Patient was brought to emergency department by EMS. Patient found to 

have low oxygen saturation in the lower 70s and was started on CPAP therapy. 

The patient has history of COPD and CHF. Patient states that his symptoms 

started worsening yesterday. Patient is currently speaking in one-word 

sentences and is gasping to breathe. Patient denies any fevers. Patient does 

admit to worsening swelling in his legs and abdomen over the past few days. 

Patient follows a Dr. Rodriguez for primary care. Patient denies any chest pain or 

abdominal pain currently.





Review of Systems


Review of Systems





Constitutional: Denies fever or chills []


Eyes: Denies change in visual acuity, redness, or eye pain []


HENT: Denies nasal congestion or sore throat []


Respiratory: Shortness of breath []


Cardiovascular: Edema, denies chest pain []


GI: Denies abdominal pain, nausea, vomiting, bloody stools or diarrhea []


: Denies dysuria or hematuria []


Musculoskeletal: Denies back pain or joint pain []


Integument: Denies rash or skin lesions []


Neurologic: Denies headache, focal weakness or sensory changes []





Current Medications


Current Medications





 Current Medications








 Medications


  (Trade)  Dose


 Ordered  Sig/Chasity  Start Time


 Stop Time Status Last Admin


Dose Admin


 


 Albuterol/


 Ipratropium


  (Duoneb)  6 ml  1X  ONCE  4/17/17 05:30


 4/17/17 05:31 DC 4/17/17 05:17


6 ML


 


 Nitroglycerin/


 Dextrose


  (Nitroglycerin


 Drip)  250 ml @ 0


 mls/hr  CONT  PRN  4/17/17 05:15


    4/17/17 05:15


12 MLS/HR











Allergies


Allergies





 Allergies








Coded Allergies Type Severity Reaction Last Updated Verified


 


  No Known Drug Allergies    10/3/14 No











Physical Exam


Physical Exam





Constitutional: Alert, afebrile, appears in severe respiratory distress. []


HENT: Normocephalic, atraumatic, bilateral external ears normal, oropharynx 

moist, cyanosis of lips and tongue, no oral exudates, nose normal. []


Eyes: PERRLA, EOMI, conjunctiva normal, no discharge. [] 


Neck: Normal range of motion, no tenderness, supple, no stridor. [] 


Cardiovascular: Tachycardia, regular rhythm, no murmur []


Lungs & Thorax: Accessory muscle usage present, severe restriction of air 

movement bilaterally, rales bilaterally, faint expiratory wheezes []


Abdomen: Bowel sounds normal, soft, no tenderness, no masses, no pulsatile 

masses. [] 


Skin: Cool, mottled appearance, no rash. [] 


Back: No tenderness, no CVA tenderness. [] 


Extremities: No tenderness, no cyanosis, no clubbing, ROM intact, 2+ pitting 

edema in the bilateral lower extremities. [] 


Neurologic: Alert and oriented X 3, normal motor function, normal sensory 

function, no focal deficits noted. []





Current Patient Data


Vital Signs





 Vital Signs








  Date Time  Temp Pulse Resp B/P Pulse Ox O2 Delivery O2 Flow Rate FiO2


 


4/17/17 05:27     100 BiPAP/CPAP  


 


4/17/17 05:19 98.1 92 26 194/80   5 





 98.1       








Lab Values





 Laboratory Tests








Test


  4/17/17


05:05


 


White Blood Count


  15.0x10^3/uL


(4.0-11.0)  H


 


Red Blood Count


  4.99x10^6/uL


(4.30-5.70)


 


Hemoglobin


  13.1g/dL


(13.0-17.5)


 


Hematocrit


  41.9%


(39.0-53.0)


 


Mean Corpuscular Volume 84fL ()  


 


Mean Corpuscular Hemoglobin 26pg (25-35)  


 


Mean Corpuscular Hemoglobin


Concent 31g/dL (31-37)


 


 


Red Cell Distribution Width


  15.1%


(11.5-14.5)  H


 


Platelet Count


  252x10^3/uL


(140-400)


 


Neutrophils (%) (Auto) 67% (31-73)  


 


Lymphocytes (%) (Auto) 18% (24-48)  L


 


Monocytes (%) (Auto) 12% (0-9)  H


 


Eosinophils (%) (Auto) 4% (0-3)  H


 


Basophils (%) (Auto) 0% (0-3)  


 


Neutrophils # (Auto)


  10.0x10^3uL


(1.8-7.7)  H


 


Lymphocytes # (Auto)


  2.7x10^3/uL


(1.0-4.8)


 


Monocytes # (Auto)


  1.7x10^3/uL


(0.0-1.1)  H


 


Eosinophils # (Auto)


  0.5x10^3/uL


(0.0-0.7)


 


Basophils # (Auto)


  0.1x10^3/uL


(0.0-0.2)


 


Sodium Level


  142mmol/L


(136-145)


 


Potassium Level


  4.9mmol/L


(3.5-5.1)


 


Chloride Level


  103mmol/L


()


 


Carbon Dioxide Level


  28mmol/L


(21-32)


 


Anion Gap 11 (6-14)  


 


Blood Urea Nitrogen


  22mg/dL (8-26)


 


 


Creatinine


  1.3mg/dL


(0.7-1.3)


 


Estimated GFR


(Cockcroft-Gault) 55.2  


 


 


BUN/Creatinine Ratio 17 (6-20)  


 


Glucose Level


  257mg/dL


(70-99)  H


 


Lactic Acid Level


  3.9mmol/L


(0.4-2.0)  H


 


Calcium Level


  8.5mg/dL


(8.5-10.1)


 


Total Bilirubin


  0.8mg/dL


(0.2-1.0)


 


Aspartate Amino Transferase


(AST) 40U/L (15-37)


H


 


Alanine Aminotransferase (ALT) 52U/L (16-63)  


 


Alkaline Phosphatase


  86U/L ()


 


 


Creatine Kinase


  560U/L


()  H


 


Creatine Kinase MB (Mass)


  28.0ng/mL


(0.0-3.6)  H


 


Creatine Kinase MB Relative


Index 5.0% (0-4)  H


 


 


Troponin I Quantitative


  1.437ng/mL


(0.000-0.055)


 


NT-Pro-B-Type Natriuretic


Peptide 2255pg/mL


(0-124)  H


 


Total Protein


  7.5g/dL


(6.4-8.2)


 


Albumin


  3.8g/dL


(3.4-5.0)


 


Albumin/Globulin Ratio 1.0 (1.0-1.7)  





 Laboratory Tests


4/17/17 05:05








 Laboratory Tests


4/17/17 05:05














EKG


EKG


Interpreted by me: Heart rate 97, sinus tachycardia, normal intervals, left 

axis deviation, no acute ST/T-wave abnormalities present []





Radiology/Procedures


Radiology/Procedures


One view AP chest x-ray interpreted by me: Pulmonary edema present, small 

bilateral pleural effusions, cardiomegaly []





Course & Med Decision Making


Course & Med Decision Making


Pertinent Labs and Imaging studies reviewed. (See chart for details)





The patient was transitioned to BiPAP therapy immediately upon arrival in the 

emergency department. Due to presence of elevated blood pressure and edema, the 

patient appears to be in acute congestive heart failure exacerbation. Patient's 

chest x-ray confirms this with the presence of pulmonary edema.  After 

initiation of BiPAP and nitroglycerin drip, the patient is breathing much 

easier and patient's heart rate has improved. Patient also found have elevated 

troponin level and elevated lactic acid level likely due to prolonged hypoxia 

from patient's heart failure. This is expected to improve with the patient's 

current treatment.The patient will be admitted to the hospital for further 

treatment. I spoke with Dr. Boland who was on-call for Dr. Rodriguez and she accepted 

care patient in hospital. I also spoke with Dr. Eastman of cardiology who 

agreed to follow patient in hospital.








Critical care time excluding procedures: 45 minutes





Dragon Disclaimer


Dragon Disclaimer


This electronic medical record was generated, in whole or in part, using a 

voice recognition dictation system.





Departure


Departure


Impression:  


 Primary Impression:  


 Acute on chronic respiratory failure


 Additional Impressions:  


 Acute on chronic diastolic CHF (congestive heart failure)


 COPD exacerbation


 Cardiac ischemia


Disposition:  09 ADMITTED AS INPATIENT


Admitting Physician:  Erickson Phillips


Condition:  GUARDED


Referrals:  


SHA RODRIGUEZ MD (PCP)





Problem Qualifiers








 Primary Impression:  


 Acute on chronic respiratory failure


 Respiratory failure complication:  hypoxia  Qualified Code:  J96.21 - Acute 

and chronic respiratory failure with hypoxia





CON VILLATORO MD Apr 17, 2017 05:47

## 2017-04-17 NOTE — PDOC2
CONSULT


Date of Consult


Date of Consult


DATE: 4/17/17 


TIME: 12:17





Reason for Consult


Reason for Consult:


Shortness of breathe





Identification/Chief Complaint


Chief Complaint


Cant' breathe





Source


Source:  Chart review, Patient





History of Present Illness


Reason for Visit:


65 yo  male presented to ED with progressively shortening of breath 

over the past 2 days. O2 sat was in the low 70s on presentation. Patient 

complained of worsening edema in legs and abdomen as well. PMH significant for 

COPD, CHF, and HTN. Patient mentions that he was recently in the hospital for 

the same symptoms about 4 weeks ago. EKG showed sinus tachycardia and no acute 

ST wave abnormalities. Chest xray in the ED showed pulmonary edema. Patient was 

placed on cpap, given lasix, and admitted.





Since admission, patient's breathing has improved 60%. Admits to continued 

cough and improving edema. Patient admits to periodic heart palpitations and 

cramping muscular like chest pain as well that he attributes to the continued 

coughing. He denies any headaches.





Past Medical History


Cardiovascular:  CHF, HTN


Pulmonary:  COPD


GI:  No pertinent hx


Heme/Onc:  No pertinent hx


Hepatobiliary:  No pertinent hx


Psych:  No pertinent hx


Musculoskeletal:  Osteoarthritis


Rheumatologic:  No pertinent hx


Infectious disease:  No pertinent hx


ENT:  No pertinent hx


Renal/:  No pertinent hx


Endocrine:  Diabetes


Dermatology:  No pertinent hx





Past Surgical History


Past Surgical History:  Other (Rotator Cuff)





Family History


Family History:  Heart Disease





Social History


Quit


ALCOHOL:  none


Drugs:  None





Current Problem List


Problem List


 Problems


Medical Problems:


(1) Acute on chronic diastolic CHF (congestive heart failure)


Status: Acute  





(2) Acute on chronic respiratory failure


Status: Acute  





(3) Cardiac ischemia


Status: Acute  





(4) COPD exacerbation


Status: Acute  











Current Medications


Current Medications





 Current Medications


Nitroglycerin/ Dextrose (Nitroglycerin Drip) 250 ml @ 0 mls/hr CONT  PRN IV SEE 

I/O RECORD Last administered on 4/17/17at 05:15;  Start 4/17/17 at 05:15


Albuterol/ Ipratropium (Duoneb) 6 ml 1X  ONCE NEB  Last administered on 4/17/ 17at 05:17;  Start 4/17/17 at 05:30;  Stop 4/17/17 at 05:31;  Status DC


Furosemide (Lasix) 60 mg 1X  ONCE IVP  Last administered on 4/17/17at 06:21;  

Start 4/17/17 at 06:30;  Stop 4/17/17 at 06:31;  Status DC


Ondansetron HCl 4 mg 4 mg PRN Q8HRS  PRN IV NAUSEA/VOMITING;  Start 4/17/17 at 

06:15;  Stop 4/18/17 at 06:14


Sodium Chloride (Iv Sodium Chloride 0.9% 1000ml Bag) 1,000 ml @  30 mls/hr Q24H 

IV  Last administered on 4/17/17at 06:21;  Start 4/17/17 at 06:30;  Stop 4/18/ 17 at 06:29


Acetaminophen (Tylenol) 650 mg PRN Q4HRS  PRN PO FEVER;  Start 4/17/17 at 06:15

;  Stop 4/18/17 at 06:14


Albuterol/ Ipratropium (Duoneb) 3 ml RTQID NEB ;  Start 4/17/17 at 08:00;  Stop 

4/18/17 at 07:59


Fluticasone Propionate (Flonase) 2 spray DAILY NS ;  Start 4/18/17 at 09:00


Non-Formulary Medication 4 gm DAILY IH ;  Start 4/18/17 at 09:00;  Status UNV


Insulin Aspart (Novolog) 25 units TIDAC SQ ;  Start 4/17/17 at 11:30;  Stop 4/17 /17 at 11:55;  Status DC


Losartan Potassium (Cozaar) 25 mg DAILY PO ;  Start 4/18/17 at 09:00;  Stop 4/18 /17 at 09:00;  Status DC


Albuterol Sulfate (Ventolin Neb Soln) 2.5 mg PRN Q4HRS  PRN NEB SHORTNESS OF 

BREATH;  Start 4/17/17 at 09:15


Insulin Detemir (Levemir) 70 units QHS SQ ;  Start 4/17/17 at 21:00


Losartan Potassium (Cozaar) 25 mg DAILY PO  Last administered on 4/17/17at 10:04

;  Start 4/17/17 at 09:15


Furosemide (Lasix) 20 mg DAILY PO  Last administered on 4/17/17at 10:04;  Start 

4/17/17 at 09:15


Insulin Aspart (Novolog) 0-7 UNITS TIDWMEALS SQ ;  Start 4/17/17 at 12:00


Dextrose (Dextrose 50%-Water Syringe) 12.5 gm PRN Q15MIN  PRN IV SEE COMMENTS;  

Start 4/17/17 at 09:15


Insulin Aspart (Novolog) 15 units TIDAC SQ  Last administered on 4/17/17at 12:00

;  Start 4/17/17 at 12:00





Active Scripts


Active


Reported


Stiolto Respimat Inhal Spray (Tiotropium Br/Olodaterol HCl) 4 Gm Mist.inhal 4 

Gm IH DAILY


Novolog Flexpen (Insulin Aspart) 100 Unit/1 Ml Insuln.pen 1 Unit SQ 


Ventolin Hfa Inhaler (Albuterol Sulfate) 18 Gm Hfa.aer.ad 2 Puff INH QID


Flovent 110MCG Hfa (Fluticasone Propionate) 12 Gm Aer.w.adap 2 Puff IH BID


Losartan Potassium 25 Mg Tablet 100 Mg PO DAILY


Levemir (Insulin Detemir) 100 Unit/1 Ml Vial 70 Unit SQ QHS





Allergies


Allergies:  


Coded Allergies:  


     No Known Drug Allergies (Unverified , 10/3/14)





Physical Exam


General:  Alert, Oriented X3, Cooperative, No acute distress


HEENT:  Atraumatic, EOMI


Heart:  Regular rate, Other (irregularly irregular)


Abdomen:  Normal bowel sounds, No tenderness


Extremities:  No clubbing, Other (3+ pitting edema on R lower extremity, 2+ 

pitting edema on L lower extremity)


Skin:  No rashes, No significant lesion


Neuro:  Normal speech


Psych/Mental Status:  Mental status NL, Mood NL





Vitals


VITALS





 Vital Signs








  Date Time  Temp Pulse Resp B/P Pulse Ox O2 Delivery O2 Flow Rate FiO2


 


4/17/17 11:00  82 22 154/91 96 Nasal Cannula 5.0 


 


4/17/17 07:00 97.8       





 97.8       











Labs


Labs





Laboratory Tests








Test


  4/17/17


05:05 4/17/17


05:35 4/17/17


06:20 4/17/17


09:05


 


White Blood Count


  15.0x10^3/uL


(4.0-11.0) 


  


  


 


 


Red Blood Count


  4.99x10^6/uL


(4.30-5.70) 


  


  


 


 


Hemoglobin


  13.1g/dL


(13.0-17.5) 


  


  


 


 


Hematocrit


  41.9%


(39.0-53.0) 


  


  


 


 


Mean Corpuscular Volume 84fL ()    


 


Mean Corpuscular Hemoglobin 26pg (25-35)    


 


Mean Corpuscular Hemoglobin


Concent 31g/dL (31-37) 


  


  


  


 


 


Red Cell Distribution Width


  15.1%


(11.5-14.5) 


  


  


 


 


Platelet Count


  252x10^3/uL


(140-400) 


  


  


 


 


Neutrophils (%) (Auto) 67% (31-73)    


 


Lymphocytes (%) (Auto) 18% (24-48)    


 


Monocytes (%) (Auto) 12% (0-9)    


 


Eosinophils (%) (Auto) 4% (0-3)    


 


Basophils (%) (Auto) 0% (0-3)    


 


Neutrophils # (Auto)


  10.0x10^3uL


(1.8-7.7) 


  


  


 


 


Lymphocytes # (Auto)


  2.7x10^3/uL


(1.0-4.8) 


  


  


 


 


Monocytes # (Auto)


  1.7x10^3/uL


(0.0-1.1) 


  


  


 


 


Eosinophils # (Auto)


  0.5x10^3/uL


(0.0-0.7) 


  


  


 


 


Basophils # (Auto)


  0.1x10^3/uL


(0.0-0.2) 


  


  


 


 


Sodium Level


  142mmol/L


(136-145) 


  


  


 


 


Potassium Level


  4.9mmol/L


(3.5-5.1) 


  


  


 


 


Chloride Level


  103mmol/L


() 


  


  


 


 


Carbon Dioxide Level


  28mmol/L


(21-32) 


  


  


 


 


Anion Gap 11 (6-14)    


 


Blood Urea Nitrogen 22mg/dL (8-26)    


 


Creatinine


  1.3mg/dL


(0.7-1.3) 


  


  


 


 


Estimated GFR


(Cockcroft-Gault) 55.2 


  


  


  


 


 


BUN/Creatinine Ratio 17 (6-20)    


 


Glucose Level


  257mg/dL


(70-99) 


  


  


 


 


Lactic Acid Level


  3.9mmol/L


(0.4-2.0) 


  


  2.2mmol/L


(0.4-2.0)


 


Calcium Level


  8.5mg/dL


(8.5-10.1) 


  


  


 


 


Total Bilirubin


  0.8mg/dL


(0.2-1.0) 


  


  


 


 


Aspartate Amino Transf


(AST/SGOT) 40U/L (15-37) 


  


  


  


 


 


Alanine Aminotransferase


(ALT/SGPT) 52U/L (16-63) 


  


  


  


 


 


Alkaline Phosphatase 86U/L ()    


 


Creatine Kinase


  560U/L


() 


  


  


 


 


Creatine Kinase MB (Mass)


  28.0ng/mL


(0.0-3.6) 


  


  


 


 


Creatine Kinase MB Relative


Index 5.0% (0-4) 


  


  


  


 


 


Troponin I Quantitative


  1.437ng/mL


(0.000-0.055) 


  


  


 


 


NT-Pro-B-Type Natriuretic


Peptide 2255pg/mL


(0-124) 


  


  


 


 


Total Protein


  7.5g/dL


(6.4-8.2) 


  


  


 


 


Albumin


  3.8g/dL


(3.4-5.0) 


  


  


 


 


Albumin/Globulin Ratio 1.0 (1.0-1.7)    


 


Urine Collection Type  Unknown   


 


Urine Color  Yellow   


 


Urine Clarity  Clear   


 


Urine pH  5.0   


 


Urine Specific Gravity  1.025   


 


Urine Protein


  


  30mg/dL


(NEG-TRACE) 


  


 


 


Urine Glucose (UA)  100mg/dL (NEG)   


 


Urine Ketones (Stick)


  


  Negativemg/dL


(NEG) 


  


 


 


Urine Blood  Negative (NEG)   


 


Urine Nitrite  Negative (NEG)   


 


Urine Bilirubin  Negative (NEG)   


 


Urine Urobilinogen Dipstick


  


  0.2mg/dL (0.2


mg/dL) 


  


 


 


Urine Leukocyte Esterase  Negative (NEG)   


 


Urine RBC  0/HPF (0-2)   


 


Urine WBC  Occ/HPF (0-4)   


 


Urine Squamous Epithelial


Cells 


  Few/LPF 


  


  


 


 


Urine Bacteria  0/HPF (0-FEW)   


 


Urine Hyaline Casts  Occasional/HPF   


 


Urine Mucus  Mod/LPF   


 


O2 Saturation   96% (92-99)  


 


Arterial Blood pH


  


  


  7.36


(7.35-7.45) 


 


 


Arterial Blood pCO2 at


Patient Temp 


  


  49mmHg (35-46) 


  


 


 


Arterial Blood pO2 at Patient


Temp 


  


  91mmHg


() 


 


 


Arterial Blood HCO3


  


  


  27mmol/L


(21-28) 


 


 


Arterial Blood Base Excess   1mmol/L (-3-3)  


 


FiO2   35.0  














Test


  4/17/17


10:50 4/17/17


11:49 


  


 


 


Troponin I Quantitative


  4.505ng/mL


(0.000-0.055) 


  


  


 


 


Glucose (Fingerstick)


  


  157mg/dL


(70-99) 


  


 








Laboratory Tests








Test


  4/17/17


05:05 4/17/17


05:35 4/17/17


06:20 4/17/17


09:05


 


White Blood Count


  15.0x10^3/uL


(4.0-11.0) 


  


  


 


 


Red Blood Count


  4.99x10^6/uL


(4.30-5.70) 


  


  


 


 


Hemoglobin


  13.1g/dL


(13.0-17.5) 


  


  


 


 


Hematocrit


  41.9%


(39.0-53.0) 


  


  


 


 


Mean Corpuscular Volume 84fL ()    


 


Mean Corpuscular Hemoglobin 26pg (25-35)    


 


Mean Corpuscular Hemoglobin


Concent 31g/dL (31-37) 


  


  


  


 


 


Red Cell Distribution Width


  15.1%


(11.5-14.5) 


  


  


 


 


Platelet Count


  252x10^3/uL


(140-400) 


  


  


 


 


Neutrophils (%) (Auto) 67% (31-73)    


 


Lymphocytes (%) (Auto) 18% (24-48)    


 


Monocytes (%) (Auto) 12% (0-9)    


 


Eosinophils (%) (Auto) 4% (0-3)    


 


Basophils (%) (Auto) 0% (0-3)    


 


Neutrophils # (Auto)


  10.0x10^3uL


(1.8-7.7) 


  


  


 


 


Lymphocytes # (Auto)


  2.7x10^3/uL


(1.0-4.8) 


  


  


 


 


Monocytes # (Auto)


  1.7x10^3/uL


(0.0-1.1) 


  


  


 


 


Eosinophils # (Auto)


  0.5x10^3/uL


(0.0-0.7) 


  


  


 


 


Basophils # (Auto)


  0.1x10^3/uL


(0.0-0.2) 


  


  


 


 


Sodium Level


  142mmol/L


(136-145) 


  


  


 


 


Potassium Level


  4.9mmol/L


(3.5-5.1) 


  


  


 


 


Chloride Level


  103mmol/L


() 


  


  


 


 


Carbon Dioxide Level


  28mmol/L


(21-32) 


  


  


 


 


Anion Gap 11 (6-14)    


 


Blood Urea Nitrogen 22mg/dL (8-26)    


 


Creatinine


  1.3mg/dL


(0.7-1.3) 


  


  


 


 


Estimated GFR


(Cockcroft-Gault) 55.2 


  


  


  


 


 


BUN/Creatinine Ratio 17 (6-20)    


 


Glucose Level


  257mg/dL


(70-99) 


  


  


 


 


Lactic Acid Level


  3.9mmol/L


(0.4-2.0) 


  


  2.2mmol/L


(0.4-2.0)


 


Calcium Level


  8.5mg/dL


(8.5-10.1) 


  


  


 


 


Total Bilirubin


  0.8mg/dL


(0.2-1.0) 


  


  


 


 


Aspartate Amino Transf


(AST/SGOT) 40U/L (15-37) 


  


  


  


 


 


Alanine Aminotransferase


(ALT/SGPT) 52U/L (16-63) 


  


  


  


 


 


Alkaline Phosphatase 86U/L ()    


 


Creatine Kinase


  560U/L


() 


  


  


 


 


Creatine Kinase MB (Mass)


  28.0ng/mL


(0.0-3.6) 


  


  


 


 


Creatine Kinase MB Relative


Index 5.0% (0-4) 


  


  


  


 


 


Troponin I Quantitative


  1.437ng/mL


(0.000-0.055) 


  


  


 


 


NT-Pro-B-Type Natriuretic


Peptide 2255pg/mL


(0-124) 


  


  


 


 


Total Protein


  7.5g/dL


(6.4-8.2) 


  


  


 


 


Albumin


  3.8g/dL


(3.4-5.0) 


  


  


 


 


Albumin/Globulin Ratio 1.0 (1.0-1.7)    


 


Urine Collection Type  Unknown   


 


Urine Color  Yellow   


 


Urine Clarity  Clear   


 


Urine pH  5.0   


 


Urine Specific Gravity  1.025   


 


Urine Protein


  


  30mg/dL


(NEG-TRACE) 


  


 


 


Urine Glucose (UA)  100mg/dL (NEG)   


 


Urine Ketones (Stick)


  


  Negativemg/dL


(NEG) 


  


 


 


Urine Blood  Negative (NEG)   


 


Urine Nitrite  Negative (NEG)   


 


Urine Bilirubin  Negative (NEG)   


 


Urine Urobilinogen Dipstick


  


  0.2mg/dL (0.2


mg/dL) 


  


 


 


Urine Leukocyte Esterase  Negative (NEG)   


 


Urine RBC  0/HPF (0-2)   


 


Urine WBC  Occ/HPF (0-4)   


 


Urine Squamous Epithelial


Cells 


  Few/LPF 


  


  


 


 


Urine Bacteria  0/HPF (0-FEW)   


 


Urine Hyaline Casts  Occasional/HPF   


 


Urine Mucus  Mod/LPF   


 


O2 Saturation   96% (92-99)  


 


Arterial Blood pH


  


  


  7.36


(7.35-7.45) 


 


 


Arterial Blood pCO2 at


Patient Temp 


  


  49mmHg (35-46) 


  


 


 


Arterial Blood pO2 at Patient


Temp 


  


  91mmHg


() 


 


 


Arterial Blood HCO3


  


  


  27mmol/L


(21-28) 


 


 


Arterial Blood Base Excess   1mmol/L (-3-3)  


 


FiO2   35.0  














Test


  4/17/17


10:50 4/17/17


11:49 


  


 


 


Troponin I Quantitative


  4.505ng/mL


(0.000-0.055) 


  


  


 


 


Glucose (Fingerstick)


  


  157mg/dL


(70-99) 


  


 











Assessment/Plan


Assessment/Plan


Assessment


Anasarca


Acute exacerbation of COPD, CHF


HTN, controlled


A fib, new onset  





Plan:


1. Limited echo ordered - determine if any changes since last admission


- March 2017 LV EF of 45% with functioning atrial and mitral valves and mild LV 

dysfunction


2. Move from ICU to cardiac floor 


3. Cath on Wednesday prospectively 


- BUN and Cr at baseline, troponins elevated 1.437, BNP elevated 2255, CK 

elevated 5609, AST/ALT wnl


- Kidneys and liver seem not to be involved 


4. Cardizem drip for afib





Thank you for allowing me the pleasure of taking care of this patient!








URSULA CHU MD Apr 17, 2017 12:33

## 2017-04-17 NOTE — ACF
Admission Forms Criteria


                                 RESPIRATORY FAILURE [facility]





Clinical Indications for Admission to Inpatient Care





                                                                    (Place 'X' 

for any and all applicable criteria):


                                                                          


Hospital admission is needed for appropriate care of the patient because of 

acute respiratory failure or insufficiency 


as indicated by ANY ONE of the following(1)(2)(3)(4)(5)(6)(7)(8):





[X]I.    Mechanical ventilation needed (acute invasive or noninvasive)


[ ]II.   Severe ventilation deficit as indicated by ANY ONE of the following (9)


        [ ]a)  Respiratory acidosis (pH less than 7.32 and partial pressure of 

carbon dioxide greater 


                than 40 mm Hg (5.3 kPa))


        [ ]b)  Partial pressure of carbon dioxide greater than 44 mm Hg (5.9 kPa

) (new)


        [ ]c)  Airflow measurements less than 25% of predicted (eg, peak 

expiratory flow rate 


                less than 100 L/minute)


        [ ]d)  Forced vital capacity less than 15 mL/kg of ideal body weight, 

or 50% decrease in 


                vital capacity from baseline


[ ]III.  Noncardiac pulmonary edema not resolving with rapid emergency 

treatment (8)


[ ]IV. Severe respiratory distress as indicated by ANY ONE of the following:


        [ ]a)  Severe tachypnea (respiratory rate greater than 30, greater than 

45 for 6-month-old, 


                greater than 60 for )


        [ ]b)  Severe hypoxemia (partial pressure of oxygen less than 50 mm Hg (

6.7 kPa) on greater


                than 50% oxygen or partial pressure of oxygen to FIO2 ratio 

less than 200)


        [ ]c)  Mental status deterioration from respiratory disease


[ ]V.  Airway obstruction or inadequate protection [A](10)(11) 











The original [employer] content created by [employer] has been revised. 


The portions of the content which have been revised are identified through the 

use of italic text or in bold, and [employer] 


has neither  reviewed nor approved the modified material. All other unmodified 

content is copyright  [employer].





Please see references footnoted in the original [employer] edition 

2016


Admission Criteria Met?:  Yes








JACQUELINE LIN 2017 07:09

## 2017-04-17 NOTE — RAD
Portable chest, 4/17/2017:



History: Respiratory failure



Comparison is made to a study from 3/14/2017. The heart is within normal

limits in size. The pulmonary vascularity is prominent. There is linear

scarring in the left base. No pulmonary consolidation is seen. There is no

evidence of pleural fluid. Mild spurring is present in the spine.



IMPRESSION: Borderline vascular congestion.

## 2017-04-17 NOTE — PDOC1
History and Physical


Date of Admission


Date of Admission


DATE: 4/17/17





Identification/Chief Complaint


Chief Complaint


Couldn't breathe


Problems:  





Source


Source:  Patient





History of Present Illness


History of Present Illness


Pt states that he has had increased difficulty breathing over the past couple 

of days.  He was going to try and wait, but got significantly worse over night 

and had to call EMS.  States that he could tell he was retaining fluids.





Past Medical History


Cardiovascular:  CHF, HTN


Pulmonary:  COPD


GI:  No pertinent hx


Heme/Onc:  No pertinent hx


Hepatobiliary:  No pertinent hx


Psych:  No pertinent hx


Musculoskeletal:  Osteoarthritis


Rheumatologic:  No pertinent hx


Infectious disease:  No pertinent hx


ENT:  No pertinent hx


Renal/:  No pertinent hx


Endocrine:  Diabetes


Dermatology:  No pertinent hx





Past Surgical History


Past Surgical History:  Other (Rotator Cuff)





Family History


Family History:  Heart Disease





Social History


Smoke:  Quit


ALCOHOL:  none


Drugs:  None





Current Problem List


Problem List


 Problems


Medical Problems:


(1) Acute on chronic diastolic CHF (congestive heart failure)


Status: Acute  





(2) Acute on chronic respiratory failure


Status: Acute  





(3) Cardiac ischemia


Status: Acute  





(4) COPD exacerbation


Status: Acute  








Problems:  





Current Medications


Current Medications





 Current Medications


Nitroglycerin/ Dextrose (Nitroglycerin Drip) 250 ml @ 0 mls/hr CONT  PRN IV SEE 

I/O RECORD Last administered on 4/17/17at 05:15;  Start 4/17/17 at 05:15


Albuterol/ Ipratropium (Duoneb) 6 ml 1X  ONCE NEB  Last administered on 4/17/ 17at 05:17;  Start 4/17/17 at 05:30;  Stop 4/17/17 at 05:31;  Status DC


Furosemide (Lasix) 60 mg 1X  ONCE IVP  Last administered on 4/17/17at 06:21;  

Start 4/17/17 at 06:30;  Stop 4/17/17 at 06:31;  Status DC


Ondansetron HCl 4 mg 4 mg PRN Q8HRS  PRN IV NAUSEA/VOMITING;  Start 4/17/17 at 

06:15;  Stop 4/18/17 at 06:14


Sodium Chloride (Iv Sodium Chloride 0.9% 1000ml Bag) 1,000 ml @  30 mls/hr Q24H 

IV  Last administered on 4/17/17at 06:21;  Start 4/17/17 at 06:30;  Stop 4/18/ 17 at 06:29


Acetaminophen (Tylenol) 650 mg PRN Q4HRS  PRN PO FEVER;  Start 4/17/17 at 06:15

;  Stop 4/18/17 at 06:14


Albuterol/ Ipratropium (Duoneb) 3 ml RTQID NEB ;  Start 4/17/17 at 08:00;  Stop 

4/18/17 at 07:59





Active Scripts


Active


Reported


Stiolto Respimat Inhal Spray (Tiotropium Br/Olodaterol HCl) 4 Gm Mist.inhal 4 

Gm IH DAILY


Novolog Flexpen (Insulin Aspart) 100 Unit/1 Ml Insuln.pen 1 Unit SQ 


Ventolin Hfa Inhaler (Albuterol Sulfate) 18 Gm Hfa.aer.ad 2 Puff INH QID


Flovent 110MCG Hfa (Fluticasone Propionate) 12 Gm Aer.w.adap 2 Puff IH BID


Losartan Potassium 25 Mg Tablet 100 Mg PO DAILY


Levemir (Insulin Detemir) 100 Unit/1 Ml Vial 70 Unit SQ QHS





Allergies


Allergies:  


Coded Allergies:  


     No Known Drug Allergies (Unverified , 10/3/14)





ROS


General:  No: Chills, Night Sweats


PSYCHOLOGICAL ROS:  No: Anxiety, Depression


Eyes:  No Decreased vision, No Eye Pain


HEENT:  No: Nasal congestion, Sore Throat


ALLERGY AND IMMUNOLOGY:  No: Hives, Post Nasal Drip


Hematological and Lymphatic:  No: Bleeding Problems, Blood Clots


Respiratory:  YES: Orthopnea, Shortness of breath, 


   No: Cough, Sputum Changes


Cardiovascular:  yes Edema, 


   No Chest Pain


Gastrointestinal:  Yes Abdominal Pain, 


   No Constipation, No Diarrhea, No Nausea, No Vomiting


Genitourinary:  No Dysuria, No Urgency


Musculoskeletal:  No Joint Pain, No Muscle Pain


Neurological:  No Impaired Coord/balance, No Memory Loss, No Numbness/Tingling


Skin:  No Rash, No Skin Lesion Changes





Physical Exam


General:  Alert, Oriented X3, Cooperative, mild distress, Other (speaking in 4-

5 word sentences before catching breath)


HEENT:  Atraumatic, PERRLA, EOMI, Mucous membr. moist/pink


Lungs:  Clear to auscultation, Normal air movement


Heart:  RRR, no rubs, no gallops, no murmurs


Abdomen:  Normal bowel sounds, Other (distended)


Extremities:  No clubbing, No cyanosis, Other (1+ edema LE bilaterally, worse 

on right)


Skin:  No rashes, No breakdown, No significant lesion


Neuro:  Normal tone, Sensation intact, Cranial nerves 3-12 NL


Psych/Mental Status:  Mental status NL, Mood NL





Vitals


Vitals





 Vital Signs








  Date Time  Temp Pulse Resp B/P Pulse Ox O2 Delivery O2 Flow Rate FiO2


 


4/17/17 06:20  74 16 151/69 99 BiPAP/CPAP  


 


4/17/17 05:19 98.1      5 





 98.1       











Labs


Labs





Laboratory Tests








Test


  4/17/17


05:05 4/17/17


05:35 4/17/17


06:20


 


White Blood Count


  15.0x10^3/uL


(4.0-11.0) 


  


 


 


Red Blood Count


  4.99x10^6/uL


(4.30-5.70) 


  


 


 


Hemoglobin


  13.1g/dL


(13.0-17.5) 


  


 


 


Hematocrit


  41.9%


(39.0-53.0) 


  


 


 


Mean Corpuscular Volume 84fL ()   


 


Mean Corpuscular Hemoglobin 26pg (25-35)   


 


Mean Corpuscular Hemoglobin


Concent 31g/dL (31-37) 


  


  


 


 


Red Cell Distribution Width


  15.1%


(11.5-14.5) 


  


 


 


Platelet Count


  252x10^3/uL


(140-400) 


  


 


 


Neutrophils (%) (Auto) 67% (31-73)   


 


Lymphocytes (%) (Auto) 18% (24-48)   


 


Monocytes (%) (Auto) 12% (0-9)   


 


Eosinophils (%) (Auto) 4% (0-3)   


 


Basophils (%) (Auto) 0% (0-3)   


 


Neutrophils # (Auto)


  10.0x10^3uL


(1.8-7.7) 


  


 


 


Lymphocytes # (Auto)


  2.7x10^3/uL


(1.0-4.8) 


  


 


 


Monocytes # (Auto)


  1.7x10^3/uL


(0.0-1.1) 


  


 


 


Eosinophils # (Auto)


  0.5x10^3/uL


(0.0-0.7) 


  


 


 


Basophils # (Auto)


  0.1x10^3/uL


(0.0-0.2) 


  


 


 


Sodium Level


  142mmol/L


(136-145) 


  


 


 


Potassium Level


  4.9mmol/L


(3.5-5.1) 


  


 


 


Chloride Level


  103mmol/L


() 


  


 


 


Carbon Dioxide Level


  28mmol/L


(21-32) 


  


 


 


Anion Gap 11 (6-14)   


 


Blood Urea Nitrogen 22mg/dL (8-26)   


 


Creatinine


  1.3mg/dL


(0.7-1.3) 


  


 


 


Estimated GFR


(Cockcroft-Gault) 55.2 


  


  


 


 


BUN/Creatinine Ratio 17 (6-20)   


 


Glucose Level


  257mg/dL


(70-99) 


  


 


 


Lactic Acid Level


  3.9mmol/L


(0.4-2.0) 


  


 


 


Calcium Level


  8.5mg/dL


(8.5-10.1) 


  


 


 


Total Bilirubin


  0.8mg/dL


(0.2-1.0) 


  


 


 


Aspartate Amino Transf


(AST/SGOT) 40U/L (15-37) 


  


  


 


 


Alanine Aminotransferase


(ALT/SGPT) 52U/L (16-63) 


  


  


 


 


Alkaline Phosphatase 86U/L ()   


 


Creatine Kinase


  560U/L


() 


  


 


 


Creatine Kinase MB (Mass)


  28.0ng/mL


(0.0-3.6) 


  


 


 


Creatine Kinase MB Relative


Index 5.0% (0-4) 


  


  


 


 


Troponin I Quantitative


  1.437ng/mL


(0.000-0.055) 


  


 


 


NT-Pro-B-Type Natriuretic


Peptide 2255pg/mL


(0-124) 


  


 


 


Total Protein


  7.5g/dL


(6.4-8.2) 


  


 


 


Albumin


  3.8g/dL


(3.4-5.0) 


  


 


 


Albumin/Globulin Ratio 1.0 (1.0-1.7)   


 


Urine Collection Type  Unknown  


 


Urine Color  Yellow  


 


Urine Clarity  Clear  


 


Urine pH  5.0  


 


Urine Specific Gravity  1.025  


 


Urine Protein


  


  30mg/dL


(NEG-TRACE) 


 


 


Urine Glucose (UA)  100mg/dL (NEG)  


 


Urine Ketones (Stick)


  


  Negativemg/dL


(NEG) 


 


 


Urine Blood  Negative (NEG)  


 


Urine Nitrite  Negative (NEG)  


 


Urine Bilirubin  Negative (NEG)  


 


Urine Urobilinogen Dipstick


  


  0.2mg/dL (0.2


mg/dL) 


 


 


Urine Leukocyte Esterase  Negative (NEG)  


 


Urine RBC  0/HPF (0-2)  


 


Urine WBC  Occ/HPF (0-4)  


 


Urine Squamous Epithelial


Cells 


  Few/LPF 


  


 


 


Urine Bacteria  0/HPF (0-FEW)  


 


Urine Hyaline Casts  Occasional/HPF  


 


Urine Mucus  Mod/LPF  


 


O2 Saturation   96% (92-99) 


 


Arterial Blood pH


  


  


  7.36


(7.35-7.45)


 


Arterial Blood pCO2 at


Patient Temp 


  


  49mmHg (35-46) 


 


 


Arterial Blood pO2 at Patient


Temp 


  


  91mmHg


()


 


Arterial Blood HCO3


  


  


  27mmol/L


(21-28)


 


Arterial Blood Base Excess   1mmol/L (-3-3) 


 


FiO2   35.0 








Laboratory Tests








Test


  4/17/17


05:05 4/17/17


05:35 4/17/17


06:20


 


White Blood Count


  15.0x10^3/uL


(4.0-11.0) 


  


 


 


Red Blood Count


  4.99x10^6/uL


(4.30-5.70) 


  


 


 


Hemoglobin


  13.1g/dL


(13.0-17.5) 


  


 


 


Hematocrit


  41.9%


(39.0-53.0) 


  


 


 


Mean Corpuscular Volume 84fL ()   


 


Mean Corpuscular Hemoglobin 26pg (25-35)   


 


Mean Corpuscular Hemoglobin


Concent 31g/dL (31-37) 


  


  


 


 


Red Cell Distribution Width


  15.1%


(11.5-14.5) 


  


 


 


Platelet Count


  252x10^3/uL


(140-400) 


  


 


 


Neutrophils (%) (Auto) 67% (31-73)   


 


Lymphocytes (%) (Auto) 18% (24-48)   


 


Monocytes (%) (Auto) 12% (0-9)   


 


Eosinophils (%) (Auto) 4% (0-3)   


 


Basophils (%) (Auto) 0% (0-3)   


 


Neutrophils # (Auto)


  10.0x10^3uL


(1.8-7.7) 


  


 


 


Lymphocytes # (Auto)


  2.7x10^3/uL


(1.0-4.8) 


  


 


 


Monocytes # (Auto)


  1.7x10^3/uL


(0.0-1.1) 


  


 


 


Eosinophils # (Auto)


  0.5x10^3/uL


(0.0-0.7) 


  


 


 


Basophils # (Auto)


  0.1x10^3/uL


(0.0-0.2) 


  


 


 


Sodium Level


  142mmol/L


(136-145) 


  


 


 


Potassium Level


  4.9mmol/L


(3.5-5.1) 


  


 


 


Chloride Level


  103mmol/L


() 


  


 


 


Carbon Dioxide Level


  28mmol/L


(21-32) 


  


 


 


Anion Gap 11 (6-14)   


 


Blood Urea Nitrogen 22mg/dL (8-26)   


 


Creatinine


  1.3mg/dL


(0.7-1.3) 


  


 


 


Estimated GFR


(Cockcroft-Gault) 55.2 


  


  


 


 


BUN/Creatinine Ratio 17 (6-20)   


 


Glucose Level


  257mg/dL


(70-99) 


  


 


 


Lactic Acid Level


  3.9mmol/L


(0.4-2.0) 


  


 


 


Calcium Level


  8.5mg/dL


(8.5-10.1) 


  


 


 


Total Bilirubin


  0.8mg/dL


(0.2-1.0) 


  


 


 


Aspartate Amino Transf


(AST/SGOT) 40U/L (15-37) 


  


  


 


 


Alanine Aminotransferase


(ALT/SGPT) 52U/L (16-63) 


  


  


 


 


Alkaline Phosphatase 86U/L ()   


 


Creatine Kinase


  560U/L


() 


  


 


 


Creatine Kinase MB (Mass)


  28.0ng/mL


(0.0-3.6) 


  


 


 


Creatine Kinase MB Relative


Index 5.0% (0-4) 


  


  


 


 


Troponin I Quantitative


  1.437ng/mL


(0.000-0.055) 


  


 


 


NT-Pro-B-Type Natriuretic


Peptide 2255pg/mL


(0-124) 


  


 


 


Total Protein


  7.5g/dL


(6.4-8.2) 


  


 


 


Albumin


  3.8g/dL


(3.4-5.0) 


  


 


 


Albumin/Globulin Ratio 1.0 (1.0-1.7)   


 


Urine Collection Type  Unknown  


 


Urine Color  Yellow  


 


Urine Clarity  Clear  


 


Urine pH  5.0  


 


Urine Specific Gravity  1.025  


 


Urine Protein


  


  30mg/dL


(NEG-TRACE) 


 


 


Urine Glucose (UA)  100mg/dL (NEG)  


 


Urine Ketones (Stick)


  


  Negativemg/dL


(NEG) 


 


 


Urine Blood  Negative (NEG)  


 


Urine Nitrite  Negative (NEG)  


 


Urine Bilirubin  Negative (NEG)  


 


Urine Urobilinogen Dipstick


  


  0.2mg/dL (0.2


mg/dL) 


 


 


Urine Leukocyte Esterase  Negative (NEG)  


 


Urine RBC  0/HPF (0-2)  


 


Urine WBC  Occ/HPF (0-4)  


 


Urine Squamous Epithelial


Cells 


  Few/LPF 


  


 


 


Urine Bacteria  0/HPF (0-FEW)  


 


Urine Hyaline Casts  Occasional/HPF  


 


Urine Mucus  Mod/LPF  


 


O2 Saturation   96% (92-99) 


 


Arterial Blood pH


  


  


  7.36


(7.35-7.45)


 


Arterial Blood pCO2 at


Patient Temp 


  


  49mmHg (35-46) 


 


 


Arterial Blood pO2 at Patient


Temp 


  


  91mmHg


()


 


Arterial Blood HCO3


  


  


  27mmol/L


(21-28)


 


Arterial Blood Base Excess   1mmol/L (-3-3) 


 


FiO2   35.0 











VTE Prophylaxis Ordered


VTE Prophylaxis Devices:  Yes


VTE Pharmacological Prophylaxi:  No





Assessment/Plan


Assessment/Plan


Pt is a 65yo CM admitted for acute on chronic respiratory failure





1)Acute on Chronic Respiratory failure- 2/2 CHF exacerbation, improved with 

Lasix.  Pt off Bipap.  Cardiology and Pulmonology consulted.  Pt started on 

Lasix 20mg qday





2)Accelerated HTN- pt was on Nitro gtt, improved.  Will restart pt's Losartan 

100mg qday





3)Elevated troponin- likely 2/2 above, but more elevated than last admission.  

Pt has risk factors, do not see recent cardiac workup.  Cardiology following.  

Pt without chest pain





4)DM2- HbA1C pending.  Pt continued on home insulin of Levemir 70 units QHS, 

Novolog 25units QAC and SSI.  If pt remains NPO, will need to adjust





5)Leukocytosis- pt afebrile and not having symptoms of infection.  CTM





6)Elevated AST- likely 2/2 hepatic congestion, CTM








TAWNY NORIEGA MD Apr 17, 2017 09:18

## 2017-04-18 VITALS — SYSTOLIC BLOOD PRESSURE: 120 MMHG | DIASTOLIC BLOOD PRESSURE: 71 MMHG

## 2017-04-18 VITALS — DIASTOLIC BLOOD PRESSURE: 62 MMHG | SYSTOLIC BLOOD PRESSURE: 145 MMHG

## 2017-04-18 VITALS — DIASTOLIC BLOOD PRESSURE: 84 MMHG | SYSTOLIC BLOOD PRESSURE: 153 MMHG

## 2017-04-18 VITALS — DIASTOLIC BLOOD PRESSURE: 51 MMHG | SYSTOLIC BLOOD PRESSURE: 119 MMHG

## 2017-04-18 VITALS — DIASTOLIC BLOOD PRESSURE: 71 MMHG | SYSTOLIC BLOOD PRESSURE: 130 MMHG

## 2017-04-18 VITALS — SYSTOLIC BLOOD PRESSURE: 132 MMHG | DIASTOLIC BLOOD PRESSURE: 74 MMHG

## 2017-04-18 LAB
ANION GAP SERPL CALC-SCNC: 5 MMOL/L (ref 6–14)
BASOPHILS # BLD AUTO: 0 X10^3/UL (ref 0–0.2)
BASOPHILS NFR BLD: 0 % (ref 0–3)
BUN SERPL-MCNC: 25 MG/DL (ref 8–26)
CALCIUM SERPL-MCNC: 9.2 MG/DL (ref 8.5–10.1)
CHLORIDE SERPL-SCNC: 97 MMOL/L (ref 98–107)
CO2 SERPL-SCNC: 39 MMOL/L (ref 21–32)
CREAT SERPL-MCNC: 1.4 MG/DL (ref 0.7–1.3)
EOSINOPHIL NFR BLD: 2 % (ref 0–3)
ERYTHROCYTE [DISTWIDTH] IN BLOOD BY AUTOMATED COUNT: 15.2 % (ref 11.5–14.5)
GFR SERPLBLD BASED ON 1.73 SQ M-ARVRAT: 50.7 ML/MIN
GLUCOSE SERPL-MCNC: 153 MG/DL (ref 70–99)
HCT VFR BLD CALC: 43.7 % (ref 39–53)
HGB BLD-MCNC: 14.3 G/DL (ref 13–17.5)
LYMPHOCYTES # BLD: 1.1 X10^3/UL (ref 1–4.8)
LYMPHOCYTES NFR BLD AUTO: 9 % (ref 24–48)
MCH RBC QN AUTO: 26 PG (ref 25–35)
MCHC RBC AUTO-ENTMCNC: 33 G/DL (ref 31–37)
MCV RBC AUTO: 80 FL (ref 79–100)
MONOCYTES NFR BLD: 10 % (ref 0–9)
NEUTROPHILS NFR BLD AUTO: 79 % (ref 31–73)
PLATELET # BLD AUTO: 222 X10^3/UL (ref 140–400)
POTASSIUM SERPL-SCNC: 3.8 MMOL/L (ref 3.5–5.1)
RBC # BLD AUTO: 5.46 X10^6/UL (ref 4.3–5.7)
SODIUM SERPL-SCNC: 141 MMOL/L (ref 136–145)
WBC # BLD AUTO: 11.2 X10^3/UL (ref 4–11)

## 2017-04-18 RX ADMIN — FLUTICASONE PROPIONATE SCH SPRAY: 50 SPRAY, METERED NASAL at 09:53

## 2017-04-18 RX ADMIN — INSULIN ASPART SCH UNITS: 100 INJECTION, SOLUTION INTRAVENOUS; SUBCUTANEOUS at 13:13

## 2017-04-18 RX ADMIN — FUROSEMIDE SCH MG: 80 TABLET ORAL at 08:28

## 2017-04-18 RX ADMIN — INSULIN ASPART SCH UNITS: 100 INJECTION, SOLUTION INTRAVENOUS; SUBCUTANEOUS at 13:14

## 2017-04-18 RX ADMIN — INSULIN DETEMIR SCH UNITS: 100 INJECTION, SOLUTION SUBCUTANEOUS at 21:20

## 2017-04-18 RX ADMIN — IPRATROPIUM BROMIDE AND ALBUTEROL SULFATE SCH ML: .5; 3 SOLUTION RESPIRATORY (INHALATION) at 07:39

## 2017-04-18 RX ADMIN — IPRATROPIUM BROMIDE AND ALBUTEROL SULFATE SCH ML: .5; 3 SOLUTION RESPIRATORY (INHALATION) at 11:42

## 2017-04-18 RX ADMIN — INSULIN ASPART SCH UNITS: 100 INJECTION, SOLUTION INTRAVENOUS; SUBCUTANEOUS at 08:27

## 2017-04-18 RX ADMIN — ENOXAPARIN SODIUM SCH MG: 80 INJECTION SUBCUTANEOUS at 17:00

## 2017-04-18 RX ADMIN — INSULIN ASPART SCH UNITS: 100 INJECTION, SOLUTION INTRAVENOUS; SUBCUTANEOUS at 17:42

## 2017-04-18 RX ADMIN — LOSARTAN POTASSIUM SCH MG: 50 TABLET ORAL at 08:29

## 2017-04-18 RX ADMIN — ACETYLCYSTEINE SCH MG: 200 INHALANT RESPIRATORY (INHALATION) at 21:13

## 2017-04-18 RX ADMIN — IPRATROPIUM BROMIDE AND ALBUTEROL SULFATE SCH ML: .5; 3 SOLUTION RESPIRATORY (INHALATION) at 15:51

## 2017-04-18 RX ADMIN — FUROSEMIDE SCH MG: 80 TABLET ORAL at 21:13

## 2017-04-18 RX ADMIN — IPRATROPIUM BROMIDE AND ALBUTEROL SULFATE SCH ML: .5; 3 SOLUTION RESPIRATORY (INHALATION) at 20:59

## 2017-04-18 RX ADMIN — Medication SCH EA: at 21:00

## 2017-04-18 RX ADMIN — INSULIN ASPART SCH UNITS: 100 INJECTION, SOLUTION INTRAVENOUS; SUBCUTANEOUS at 08:00

## 2017-04-18 RX ADMIN — ENOXAPARIN SODIUM SCH MG: 80 INJECTION SUBCUTANEOUS at 06:08

## 2017-04-18 RX ADMIN — FUROSEMIDE SCH MG: 20 TABLET ORAL at 09:00

## 2017-04-18 RX ADMIN — INSULIN ASPART SCH UNITS: 100 INJECTION, SOLUTION INTRAVENOUS; SUBCUTANEOUS at 17:00

## 2017-04-18 NOTE — PDOC
PROGRESS NOTES


Subjective


Subjective


Patient's SOB much improved. He complains of continued yessica horses that 

sometimes resolve only if he hits the affected the muscle with his fist. He 

denies any chest pain. Edema is much improved on the R lower extremity and 

resolved on the L lower extremity.





Objective


Objective





 Vital Signs








  Date Time  Temp Pulse Resp B/P Pulse Ox O2 Delivery O2 Flow Rate FiO2


 


4/18/17 11:44     97 Nasal Cannula 3.0 


 


4/18/17 10:24 98.1 78 22 120/71    





 98.1       














 Intake and Output 


 


 4/18/17





 07:00


 


Intake Total 1702 ml


 


Output Total 5675 ml


 


Balance -3973 ml


 


 


 


Intake Oral 1702 ml


 


Output Urine Total 5675 ml











Physical Exam


Abdomen:  Soft


Heart:  Regular rate, Normal S1, Normal S2


Extremities:  Other (R lower extermity 1-2+ pitting edema )


General:  Alert, Oriented X3, Cooperative, No acute distress


HEENT:  Atraumatic, EOMI


Lungs:  Clear to auscultation


Neuro:  Normal speech, Normal tone


Psych/Mental Status:  Mental status NL, Mood NL


Skin:  No rashes





Assessment


Assessment


 Problems


Medical Problems:


(1) Acute on chronic diastolic CHF (congestive heart failure)


Status: Acute  





(2) Acute on chronic respiratory failure


Status: Acute  





(3) Cardiac ischemia


Status: Acute  





(4) COPD exacerbation


Status: Acute  








Plan


Plan of Care


1. Limited echo ordered - no change in EF since March 2017 (EF remains at 45%)


- March 2017 LV EF of 45% with functioning atrial and mitral valves and mild LV 

dysfunction


2. Cath tomorrow at 1pm 


- BUN and Cr at baseline, troponins elevated 1.437, BNP elevated 2255, CK 

elevated 5609, AST/ALT wnl


- Kidneys and liver seem not to be involved 


4. Cardizem drip for afib





Thank you for allowing me the pleasure of taking care of this patient.





Comment


Review of Relevant


I have reviewed the following items elfego (where applicable) has been applied.


Labs





Laboratory Tests








Test


  4/17/17


05:05 4/17/17


05:35 4/17/17


06:20 4/17/17


07:10


 


White Blood Count


  15.0x10^3/uL


(4.0-11.0) 


  


  


 


 


Red Blood Count


  4.99x10^6/uL


(4.30-5.70) 


  


  


 


 


Hemoglobin


  13.1g/dL


(13.0-17.5) 


  


  


 


 


Hematocrit


  41.9%


(39.0-53.0) 


  


  


 


 


Mean Corpuscular Volume 84fL ()    


 


Mean Corpuscular Hemoglobin 26pg (25-35)    


 


Mean Corpuscular Hemoglobin


Concent 31g/dL (31-37) 


  


  


  


 


 


Red Cell Distribution Width


  15.1%


(11.5-14.5) 


  


  


 


 


Platelet Count


  252x10^3/uL


(140-400) 


  


  


 


 


Neutrophils (%) (Auto) 67% (31-73)    


 


Lymphocytes (%) (Auto) 18% (24-48)    


 


Monocytes (%) (Auto) 12% (0-9)    


 


Eosinophils (%) (Auto) 4% (0-3)    


 


Basophils (%) (Auto) 0% (0-3)    


 


Neutrophils # (Auto)


  10.0x10^3uL


(1.8-7.7) 


  


  


 


 


Lymphocytes # (Auto)


  2.7x10^3/uL


(1.0-4.8) 


  


  


 


 


Monocytes # (Auto)


  1.7x10^3/uL


(0.0-1.1) 


  


  


 


 


Eosinophils # (Auto)


  0.5x10^3/uL


(0.0-0.7) 


  


  


 


 


Basophils # (Auto)


  0.1x10^3/uL


(0.0-0.2) 


  


  


 


 


Sodium Level


  142mmol/L


(136-145) 


  


  


 


 


Potassium Level


  4.9mmol/L


(3.5-5.1) 


  


  


 


 


Chloride Level


  103mmol/L


() 


  


  


 


 


Carbon Dioxide Level


  28mmol/L


(21-32) 


  


  


 


 


Anion Gap 11 (6-14)    


 


Blood Urea Nitrogen 22mg/dL (8-26)    


 


Creatinine


  1.3mg/dL


(0.7-1.3) 


  


  


 


 


Estimated GFR


(Cockcroft-Gault) 55.2 


  


  


  


 


 


BUN/Creatinine Ratio 17 (6-20)    


 


Glucose Level


  257mg/dL


(70-99) 


  


  


 


 


Lactic Acid Level


  3.9mmol/L


(0.4-2.0) 


  


  


 


 


Calcium Level


  8.5mg/dL


(8.5-10.1) 


  


  


 


 


Total Bilirubin


  0.8mg/dL


(0.2-1.0) 


  


  


 


 


Aspartate Amino Transf


(AST/SGOT) 40U/L (15-37) 


  


  


  


 


 


Alanine Aminotransferase


(ALT/SGPT) 52U/L (16-63) 


  


  


  


 


 


Alkaline Phosphatase 86U/L ()    


 


Creatine Kinase


  560U/L


() 


  


  


 


 


Creatine Kinase MB (Mass)


  28.0ng/mL


(0.0-3.6) 


  


  


 


 


Creatine Kinase MB Relative


Index 5.0% (0-4) 


  


  


  


 


 


Troponin I Quantitative


  1.437ng/mL


(0.000-0.055) 


  


  


 


 


NT-Pro-B-Type Natriuretic


Peptide 2255pg/mL


(0-124) 


  


  


 


 


Total Protein


  7.5g/dL


(6.4-8.2) 


  


  


 


 


Albumin


  3.8g/dL


(3.4-5.0) 


  


  


 


 


Albumin/Globulin Ratio 1.0 (1.0-1.7)    


 


Urine Collection Type  Unknown   


 


Urine Color  Yellow   


 


Urine Clarity  Clear   


 


Urine pH  5.0   


 


Urine Specific Gravity  1.025   


 


Urine Protein


  


  30mg/dL


(NEG-TRACE) 


  


 


 


Urine Glucose (UA)  100mg/dL (NEG)   


 


Urine Ketones (Stick)


  


  Negativemg/dL


(NEG) 


  


 


 


Urine Blood  Negative (NEG)   


 


Urine Nitrite  Negative (NEG)   


 


Urine Bilirubin  Negative (NEG)   


 


Urine Urobilinogen Dipstick


  


  0.2mg/dL (0.2


mg/dL) 


  


 


 


Urine Leukocyte Esterase  Negative (NEG)   


 


Urine RBC  0/HPF (0-2)   


 


Urine WBC  Occ/HPF (0-4)   


 


Urine Squamous Epithelial


Cells 


  Few/LPF 


  


  


 


 


Urine Bacteria  0/HPF (0-FEW)   


 


Urine Hyaline Casts  Occasional/HPF   


 


Urine Mucus  Mod/LPF   


 


O2 Saturation   96% (92-99)  


 


Arterial Blood pH


  


  


  7.36


(7.35-7.45) 


 


 


Arterial Blood pCO2 at


Patient Temp 


  


  49mmHg (35-46) 


  


 


 


Arterial Blood pO2 at Patient


Temp 


  


  91mmHg


() 


 


 


Arterial Blood HCO3


  


  


  27mmol/L


(21-28) 


 


 


Arterial Blood Base Excess   1mmol/L (-3-3)  


 


FiO2   35.0  


 


Nasal Screen MRSA (PCR)


  


  


  


  Negative


(Negative)














Test


  4/17/17


09:05 4/17/17


10:50 4/17/17


11:49 4/17/17


13:50


 


Lactic Acid Level


  2.2mmol/L


(0.4-2.0) 


  


  0.9mmol/L


(0.4-2.0)


 


Troponin I Quantitative


  


  4.505ng/mL


(0.000-0.055) 


  


 


 


Glucose (Fingerstick)


  


  


  157mg/dL


(70-99) 


 














Test


  4/17/17


17:45 4/17/17


17:50 4/17/17


21:27 4/18/17


04:27


 


Glucose (Fingerstick)


  131mg/dL


(70-99) 


  195mg/dL


(70-99) 


 


 


Troponin I Quantitative


  


  6.405ng/mL


(0.000-0.055) 


  5.920ng/mL


(0.000-0.055)


 


White Blood Count


  


  


  


  11.2x10^3/uL


(4.0-11.0)


 


Red Blood Count


  


  


  


  5.46x10^6/uL


(4.30-5.70)


 


Hemoglobin


  


  


  


  14.3g/dL


(13.0-17.5)


 


Hematocrit


  


  


  


  43.7%


(39.0-53.0)


 


Mean Corpuscular Volume    80fL () 


 


Mean Corpuscular Hemoglobin    26pg (25-35) 


 


Mean Corpuscular Hemoglobin


Concent 


  


  


  33g/dL (31-37) 


 


 


Red Cell Distribution Width


  


  


  


  15.2%


(11.5-14.5)


 


Platelet Count


  


  


  


  222x10^3/uL


(140-400)


 


Neutrophils (%) (Auto)    79% (31-73) 


 


Lymphocytes (%) (Auto)    9% (24-48) 


 


Monocytes (%) (Auto)    10% (0-9) 


 


Eosinophils (%) (Auto)    2% (0-3) 


 


Basophils (%) (Auto)    0% (0-3) 


 


Neutrophils # (Auto)


  


  


  


  8.8x10^3uL


(1.8-7.7)


 


Lymphocytes # (Auto)


  


  


  


  1.1x10^3/uL


(1.0-4.8)


 


Monocytes # (Auto)


  


  


  


  1.1x10^3/uL


(0.0-1.1)


 


Eosinophils # (Auto)


  


  


  


  0.2x10^3/uL


(0.0-0.7)


 


Basophils # (Auto)


  


  


  


  0.0x10^3/uL


(0.0-0.2)


 


Sodium Level


  


  


  


  141mmol/L


(136-145)


 


Potassium Level


  


  


  


  3.8mmol/L


(3.5-5.1)


 


Chloride Level


  


  


  


  97mmol/L


()


 


Carbon Dioxide Level


  


  


  


  39mmol/L


(21-32)


 


Anion Gap    5 (6-14) 


 


Blood Urea Nitrogen    25mg/dL (8-26) 


 


Creatinine


  


  


  


  1.4mg/dL


(0.7-1.3)


 


Estimated GFR


(Cockcroft-Gault) 


  


  


  50.7 


 


 


Glucose Level


  


  


  


  153mg/dL


(70-99)


 


Calcium Level


  


  


  


  9.2mg/dL


(8.5-10.1)














Test


  4/18/17


07:42 4/18/17


11:33 


  


 


 


Glucose (Fingerstick)


  134mg/dL


(70-99) 197mg/dL


(70-99) 


  


 








Laboratory Tests








Test


  4/17/17


13:50 4/17/17


17:45 4/17/17


17:50 4/17/17


21:27


 


Lactic Acid Level


  0.9mmol/L


(0.4-2.0) 


  


  


 


 


Glucose (Fingerstick)


  


  131mg/dL


(70-99) 


  195mg/dL


(70-99)


 


Troponin I Quantitative


  


  


  6.405ng/mL


(0.000-0.055) 


 














Test


  4/18/17


04:27 4/18/17


07:42 4/18/17


11:33 


 


 


White Blood Count


  11.2x10^3/uL


(4.0-11.0) 


  


  


 


 


Red Blood Count


  5.46x10^6/uL


(4.30-5.70) 


  


  


 


 


Hemoglobin


  14.3g/dL


(13.0-17.5) 


  


  


 


 


Hematocrit


  43.7%


(39.0-53.0) 


  


  


 


 


Mean Corpuscular Volume 80fL ()    


 


Mean Corpuscular Hemoglobin 26pg (25-35)    


 


Mean Corpuscular Hemoglobin


Concent 33g/dL (31-37) 


  


  


  


 


 


Red Cell Distribution Width


  15.2%


(11.5-14.5) 


  


  


 


 


Platelet Count


  222x10^3/uL


(140-400) 


  


  


 


 


Neutrophils (%) (Auto) 79% (31-73)    


 


Lymphocytes (%) (Auto) 9% (24-48)    


 


Monocytes (%) (Auto) 10% (0-9)    


 


Eosinophils (%) (Auto) 2% (0-3)    


 


Basophils (%) (Auto) 0% (0-3)    


 


Neutrophils # (Auto)


  8.8x10^3uL


(1.8-7.7) 


  


  


 


 


Lymphocytes # (Auto)


  1.1x10^3/uL


(1.0-4.8) 


  


  


 


 


Monocytes # (Auto)


  1.1x10^3/uL


(0.0-1.1) 


  


  


 


 


Eosinophils # (Auto)


  0.2x10^3/uL


(0.0-0.7) 


  


  


 


 


Basophils # (Auto)


  0.0x10^3/uL


(0.0-0.2) 


  


  


 


 


Sodium Level


  141mmol/L


(136-145) 


  


  


 


 


Potassium Level


  3.8mmol/L


(3.5-5.1) 


  


  


 


 


Chloride Level


  97mmol/L


() 


  


  


 


 


Carbon Dioxide Level


  39mmol/L


(21-32) 


  


  


 


 


Anion Gap 5 (6-14)    


 


Blood Urea Nitrogen 25mg/dL (8-26)    


 


Creatinine


  1.4mg/dL


(0.7-1.3) 


  


  


 


 


Estimated GFR


(Cockcroft-Gault) 50.7 


  


  


  


 


 


Glucose Level


  153mg/dL


(70-99) 


  


  


 


 


Calcium Level


  9.2mg/dL


(8.5-10.1) 


  


  


 


 


Troponin I Quantitative


  5.920ng/mL


(0.000-0.055) 


  


  


 


 


Glucose (Fingerstick)


  


  134mg/dL


(70-99) 197mg/dL


(70-99) 


 








Microbiology


4/17/17 Blood Culture - Preliminary, Resulted


          NO GROWTH AFTER 1 DAY


Medications





 Current Medications


Nitroglycerin/ Dextrose (Nitroglycerin Drip) 250 ml @ 0 mls/hr CONT  PRN IV SEE 

I/O RECORD Last administered on 4/17/17at 05:15;  Start 4/17/17 at 05:15


Albuterol/ Ipratropium (Duoneb) 6 ml 1X  ONCE NEB  Last administered on 4/17/ 17at 05:17;  Start 4/17/17 at 05:30;  Stop 4/17/17 at 05:31;  Status DC


Furosemide (Lasix) 60 mg 1X  ONCE IVP  Last administered on 4/17/17at 06:21;  

Start 4/17/17 at 06:30;  Stop 4/17/17 at 06:31;  Status DC


Ondansetron HCl 4 mg 4 mg PRN Q8HRS  PRN IV NAUSEA/VOMITING;  Start 4/17/17 at 

06:15;  Stop 4/18/17 at 06:14;  Status DC


Sodium Chloride (Iv Sodium Chloride 0.9% 1000ml Bag) 1,000 ml @  30 mls/hr Q24H 

IV  Last administered on 4/17/17at 06:21;  Start 4/17/17 at 06:30;  Stop 4/18/ 17 at 06:29;  Status DC


Acetaminophen (Tylenol) 650 mg PRN Q4HRS  PRN PO FEVER Last administered on 4/17 /17at 20:35;  Start 4/17/17 at 06:15;  Stop 4/18/17 at 06:14;  Status DC


Albuterol/ Ipratropium (Duoneb) 3 ml RTQID NEB  Last administered on 4/18/17at 

07:39;  Start 4/17/17 at 08:00;  Stop 4/18/17 at 07:59;  Status DC


Fluticasone Propionate (Flonase) 2 spray DAILY NS  Last administered on 4/18/ 17at 09:53;  Start 4/18/17 at 09:00


Non-Formulary Medication 4 gm DAILY IH ;  Start 4/18/17 at 09:00;  Status UNV


Insulin Aspart (Novolog) 25 units TIDAC SQ ;  Start 4/17/17 at 11:30;  Stop 4/17 /17 at 11:55;  Status DC


Losartan Potassium (Cozaar) 25 mg DAILY PO ;  Start 4/18/17 at 09:00;  Stop 4/18 /17 at 09:00;  Status DC


Albuterol Sulfate (Ventolin Neb Soln) 2.5 mg PRN Q4HRS  PRN NEB SHORTNESS OF 

BREATH;  Start 4/17/17 at 09:15


Insulin Detemir (Levemir) 70 units QHS SQ  Last administered on 4/17/17at 22:03

;  Start 4/17/17 at 21:00


Losartan Potassium (Cozaar) 25 mg DAILY PO  Last administered on 4/17/17at 10:04

;  Start 4/17/17 at 09:15;  Stop 4/18/17 at 07:42;  Status DC


Furosemide (Lasix) 20 mg DAILY PO  Last administered on 4/17/17at 10:04;  Start 

4/17/17 at 09:15


Insulin Aspart (Novolog) 0-7 UNITS TIDWMEALS SQ  Last administered on 4/18/17at 

13:14;  Start 4/17/17 at 12:00


Dextrose (Dextrose 50%-Water Syringe) 12.5 gm PRN Q15MIN  PRN IV SEE COMMENTS;  

Start 4/17/17 at 09:15


Insulin Aspart (Novolog) 15 units TIDAC SQ  Last administered on 4/18/17at 13:13

;  Start 4/17/17 at 12:00


Furosemide (Lasix) 80 mg Q12HR PO  Last administered on 4/18/17at 08:28;  Start 

4/17/17 at 21:00;  Stop 4/19/17 at 00:00


Furosemide (Lasix) 40 mg 1X  ONCE PO  Last administered on 4/17/17at 13:13;  

Start 4/17/17 at 12:45;  Stop 4/17/17 at 12:50;  Status DC


Furosemide (Lasix) 20 mg 1X  ONCE PO ;  Start 4/17/17 at 12:45;  Stop 4/17/17 

at 12:46;  Status Cancel


Enoxaparin Sodium (Lovenox 80mg Syringe) 80 mg Q12H SQ  Last administered on 4/ 18/17at 06:08;  Start 4/17/17 at 17:00


Cyclobenzaprine HCl (Flexeril) 5 mg PRN Q6HRS  PRN PO MUSCLE SPASMS;  Start 4/18 /17 at 07:45


Losartan Potassium (Cozaar) 50 mg DAILY PO  Last administered on 4/18/17at 08:29

;  Start 4/18/17 at 09:00


Albuterol/ Ipratropium (Duoneb) 3 ml RTQID NEB  Last administered on 4/18/17at 

11:42;  Start 4/18/17 at 12:00





Active Scripts


Active


Reported


Stiolto Respimat Inhal Spray (Tiotropium Br/Olodaterol HCl) 4 Gm Mist.inhal 4 

Gm IH DAILY


Novolog Flexpen (Insulin Aspart) 100 Unit/1 Ml Insuln.pen 1 Unit SQ 


Ventolin Hfa Inhaler (Albuterol Sulfate) 18 Gm Hfa.aer.ad 2 Puff INH QID


Flovent 110MCG Hfa (Fluticasone Propionate) 12 Gm Aer.w.adap 2 Puff IH BID


Losartan Potassium 25 Mg Tablet 100 Mg PO DAILY


Levemir (Insulin Detemir) 100 Unit/1 Ml Vial 70 Unit SQ QHS


Vitals/I & O





 Vital Sign - Last 24 Hours








 4/17/17 4/17/17 4/17/17 4/17/17





 15:29 16:28 17:18 18:13


 


Temp 97.8   





 97.8   


 


Pulse 92 84 79 80


 


Resp 24   


 


B/P 147/45   


 


Pulse Ox 98   


 


O2 Delivery Nasal Cannula   


 


    





    





 4/17/17 4/17/17 4/17/17 4/17/17





 19:35 19:42 20:00 23:11


 


Temp 98.4   97.9





 98.4   97.9


 


Pulse 76   67


 


Resp 18 16


 


B/P 161/78   155/80


 


Pulse Ox 98 99  98


 


O2 Delivery Nasal Cannula Nasal Cannula Nasal Cannula Nasal Cannula


 


O2 Flow Rate 5.0 5.0 5.0 4.0


 


    





    





 4/18/17 4/18/17 4/18/17 4/18/17





 03:43 07:00 07:40 08:00


 


Temp 97.7 97.6  





 97.7 97.6  


 


Pulse 86 73  


 


Resp 18 22  


 


B/P 153/84 145/62  


 


Pulse Ox 98 97 99 


 


O2 Delivery Nasal Cannula Nasal Cannula Nasal Cannula Nasal Cannula


 


O2 Flow Rate 4.0  4.0 3.0


 


    





    





 4/18/17 4/18/17 4/18/17 





 08:29 10:24 11:44 


 


Temp  98.1  





  98.1  


 


Pulse 86 78  


 


Resp  22  


 


B/P 145/62 120/71  


 


Pulse Ox  98 97 


 


O2 Delivery  Nasal Cannula Nasal Cannula 


 


O2 Flow Rate   3.0 














 Intake and Output   


 


 4/17/17 4/17/17 4/18/17





 15:00 23:00 07:00


 


Intake Total 122 ml 980 ml 600 ml


 


Output Total 2300 ml 1875 ml 1500 ml


 


Balance -2178 ml -895 ml -900 ml














URSULA CHU MD Apr 18, 2017 13:46

## 2017-04-18 NOTE — PDOC
SUBJECTIVE


Subjective


Breathing has significantly improved, as has his swelling.  He is having lots 

of charley horses; states muscles cramp as soon as he tries to get into bed.  

Denies chest pain; troponin more elevated last night.





OBJECTIVE


Vital Signs





Vital Signs








  Date Time  Temp Pulse Resp B/P Pulse Ox O2 Delivery O2 Flow Rate FiO2


 


4/18/17 03:43 97.7 86 18 153/84 98 Nasal Cannula 4.0 





 97.7       


 


4/17/17 23:11 97.9 67 16 155/80 98 Nasal Cannula 4.0 





 97.9       


 


4/17/17 20:00      Nasal Cannula 5.0 


 


4/17/17 19:42     99 Nasal Cannula 5.0 


 


4/17/17 19:35 98.4 76 18 161/78 98 Nasal Cannula 5.0 





 98.4       


 


4/17/17 18:13  80      


 


4/17/17 17:18  79      


 


4/17/17 16:28  84      


 


4/17/17 15:29 97.8 92 24 147/45 98 Nasal Cannula  





 97.8       


 


4/17/17 13:00  83 22 147/92 97  5.0 


 


4/17/17 12:00 98.0 85 26 154/94 97 Nasal Cannula 5.0 





 98.0       


 


4/17/17 11:00  82 22 154/91 96 Nasal Cannula 5.0 


 


4/17/17 10:04  83  154/76    


 


4/17/17 10:00  82 28 143/91 96 Nasal Cannula 5.0 


 


4/17/17 09:00  78 24 154/76 97 Nasal Cannula 5.0 


 


4/17/17 08:00      Nasal Cannula 5.0 


 


4/17/17 08:00  88 20 166/79 97 Nasal Cannula 5.0 








I & O











 Intake and Output 


 


 4/18/17





 07:00


 


Intake Total 1702 ml


 


Output Total 5675 ml


 


Balance -3973 ml


 


 


 


Intake Oral 1702 ml


 


Output Urine Total 5675 ml











PHYSICAL EXAM


Physical Exam


General:  Alert, Oriented X3, Cooperative, NAD


HEENT:  Atraumatic, PERRLA, EOMI, Mucous membr. moist/pink


Lungs:  Clear to auscultation, Normal air movement


Heart:  RRR, no rubs, no gallops, no murmurs


Abdomen:  Normal bowel sounds, Other (distended)


Extremities:  No clubbing, No cyanosis, 1+ edema RLE 


Skin:  No rashes, No breakdown, No significant lesion


Neuro:  Normal tone, Sensation intact, Cranial nerves 3-12 NL


Psych/Mental Status:  Mental status NL, Mood NL





ASSESSMENT/PLAN


Assessment/Plan


Pt is a 67yo CM admitted for acute on chronic respiratory failure





1)Acute on Chronic Respiratory failure- 2/2 CHF exacerbation, resolved.  Pt 

currently on normal dose of Lasix 20mg with 80mg BID in addition.  Good net 

negative I/O's.  Cardiology and Pulmonology following





2)Accelerated HTN- pt was on Nitro gtt, improved.  Pt currently receiving 

Losartan 25mg, will increase.  





3)Elevated troponin- with increase in trend.  One more set pending this 

morning.  Possible heart cath this Wednesday.  





4)DM2- HbA1C pending.  Pt continued on home insulin of Levemir 70 units QHS, 

and Novolog decreased to 15 units QAC.  Has SSI available.  





5)Leukocytosis- improving.  Pt afebrile and not having symptoms of infection.  

CTM





6)Elevated AST- likely 2/2 hepatic congestion, CTM





7)Acute on CKD- 2/2 diuretic use.  CTM.


Problems:  





COMMENT


Lab





Laboratory Tests








Test


  4/17/17


09:05 4/17/17


10:50 4/17/17


11:49 4/17/17


13:50


 


Lactic Acid Level


  2.2mmol/L


(0.4-2.0) 


  


  0.9mmol/L


(0.4-2.0)


 


Troponin I Quantitative


  


  4.505ng/mL


(0.000-0.055) 


  


 


 


Glucose (Fingerstick)


  


  


  157mg/dL


(70-99) 


 














Test


  4/17/17


17:45 4/17/17


17:50 4/17/17


21:27 4/18/17


04:27


 


Glucose (Fingerstick)


  131mg/dL


(70-99) 


  195mg/dL


(70-99) 


 


 


Troponin I Quantitative


  


  6.405ng/mL


(0.000-0.055) 


  


 


 


White Blood Count


  


  


  


  11.2x10^3/uL


(4.0-11.0)


 


Red Blood Count


  


  


  


  5.46x10^6/uL


(4.30-5.70)


 


Hemoglobin


  


  


  


  14.3g/dL


(13.0-17.5)


 


Hematocrit


  


  


  


  43.7%


(39.0-53.0)


 


Mean Corpuscular Volume    80fL () 


 


Mean Corpuscular Hemoglobin    26pg (25-35) 


 


Mean Corpuscular Hemoglobin


Concent 


  


  


  33g/dL (31-37) 


 


 


Red Cell Distribution Width


  


  


  


  15.2%


(11.5-14.5)


 


Platelet Count


  


  


  


  222x10^3/uL


(140-400)


 


Neutrophils (%) (Auto)    79% (31-73) 


 


Lymphocytes (%) (Auto)    9% (24-48) 


 


Monocytes (%) (Auto)    10% (0-9) 


 


Eosinophils (%) (Auto)    2% (0-3) 


 


Basophils (%) (Auto)    0% (0-3) 


 


Neutrophils # (Auto)


  


  


  


  8.8x10^3uL


(1.8-7.7)


 


Lymphocytes # (Auto)


  


  


  


  1.1x10^3/uL


(1.0-4.8)


 


Monocytes # (Auto)


  


  


  


  1.1x10^3/uL


(0.0-1.1)


 


Eosinophils # (Auto)


  


  


  


  0.2x10^3/uL


(0.0-0.7)


 


Basophils # (Auto)


  


  


  


  0.0x10^3/uL


(0.0-0.2)


 


Sodium Level


  


  


  


  141mmol/L


(136-145)


 


Potassium Level


  


  


  


  3.8mmol/L


(3.5-5.1)


 


Chloride Level


  


  


  


  97mmol/L


()


 


Carbon Dioxide Level


  


  


  


  39mmol/L


(21-32)


 


Anion Gap    5 (6-14) 


 


Blood Urea Nitrogen    25mg/dL (8-26) 


 


Creatinine


  


  


  


  1.4mg/dL


(0.7-1.3)


 


Estimated GFR


(Cockcroft-Gault) 


  


  


  50.7 


 


 


Glucose Level


  


  


  


  153mg/dL


(70-99)


 


Calcium Level


  


  


  


  9.2mg/dL


(8.5-10.1)














TAWNY NORIEGA MD Apr 18, 2017 07:44

## 2017-04-18 NOTE — CONS
DATE OF CONSULTATION:  



ATTENDING PHYSICIAN:  Erickson Boland



DICTATING PHYSICIAN:  Dr. Britany Valadez



REASON FOR CONSULTATION:  The patient seen in pulmonary consultation at the

request of Dr. Boland for acute-on-chronic hypoxemic respiratory failure.



HISTORY OF PRESENT ILLNESS:  The patient is a 66-year-old male that is well

known to me, presented with increasing shortness of breath.  He was found by EMS

to have saturations of 70%.  He has underlying COPD, quit tobacco 18 months ago.

 He has chronic heart failure.  He noticed some increasing lower extremity

edema, increasing abdominal girth, increasing shortness of breath over the last

several days.  He has been compliant with his medication.  He is not as

compliant with his diet.  He denies fever, chills or night sweats, cough, mostly

nonproductive.  He normally uses oxygen at home at 4 liters.



PAST MEDICAL HISTORY:  Chronic heart failure, COPD, chronic respiratory failure,

type 2 diabetes, hyperlipidemia, and hypertension.



PAST SURGICAL HISTORY:  Status post right shoulder surgery.



REVIEW OF SYSTEMS:  As indicated above, otherwise, a 10-point system was

reviewed and negative.



CURRENT MEDICATIONS:  List was reviewed.



SOCIAL HISTORY:  He quit tobacco 18 years ago; denies any excessive alcohol

intake.



FAMILY HISTORY:  No family history of early lung disorders.



PHYSICAL EXAMINATION:

GENERAL:  The patient was in the Intensive Care Unit.

VITAL SIGNS:  He is currently off of BiPAP, receiving oxygen per nasal cannula

on 5 liters.

HEENT:  Eyes, the sclerae were nonicteric.

NECK:  Jugular venous distention was not elevated.  No lymphadenopathy.

CHEST:  Full expansion.

LUNGS:  Crackles in the bases, otherwise no wheezes.

CARDIOVASCULAR:  Regular rate and rhythm with S1, S2, no S3.

ABDOMEN:  Soft, obese.

EXTREMITIES:  No clubbing, cyanosis, 1+ edema.

NEUROLOGIC:  The patient was awake, alert, following commands.  A detailed neuro

exam was not performed.



DIAGNOSTIC DATA:  Chest x-ray revealed vascular congestion.



LABORATORY DATA:  Reviewed.  White count was elevated.  Hemoglobin and

hematocrit were noted.  Arterial blood gas; pH of 7.36, PaCO2 of 49, pO2 of 91. 

Electrolytes were noted.  CPK was elevated.  Total protein was normal.  Albumin

was normal.



IMPRESSION:

1.  Acute-on-chronic hypoxemic respiratory failure secondary to acute-on-chronic

systolic heart failure.

2.  Acute-on-chronic systolic heart failure.

3.  ____ hypertension.

4.  Elevated troponin.

5.  Type 2 diabetes.

6.  Leukocytosis, suspect reactive.

7.  Elevated liver chemistries suspect hepatic congestion.



PLAN:

1.  Continue diuresis.

2.  Okay to transfer out of the Intensive Care Unit.

3.  Continue oxygen supplementation.

4.  No need for antibiotics or steroids.

5.  Consult dietary.



I do appreciate the privilege in sharing the patient's care.

 



______________________________

BRITANY VALADEZ MD



DR:  NIKO/[email or web address]  JOB#:  [id number] / [id number]

DD:  04/17/2017 14:16  DT:  04/18/2017 00:53

## 2017-04-18 NOTE — PDOC
MODERATE SEDATION ASSESSMENT


RISKS/ALTERNATIVES


Risks/Alternatives


Risks and alternatives of this type of sedation and procedure discussed with:


RISK/ALTERNATIVES:  Patient





H & P ON CHART


H & P


H & P on chart and reviewed for co-morbid conditions and appropriate labs.


H&P ON CHART:  Yes





PREGNANCY STATUS


PREG STATUS ASSESSED:  Yes





MEDS/ALLERGIES REVIEWED


Meds/Allergies Reviewed


Medications and Allergies including time and route of recently administered 

narcotics and sedatives.


MEDS/ALLERGIES REVIEWED:  Yes





ASA RATING


ASA RATING:  II





AIRWAY ASSESSMENT


Airway Assessment


Airway patency, oral function limitations, presence  of caps, crowns, dentures, 

partials, and ability to extend neck assessed.


AIRWAY ASSESSMENT:  Yes





MALLAMPATI SCORE


MALLAMPATI SCORE:  II





PRE-SEDATION ASSESSMENT


PRE-SEDATION ASSESSMENT:  Yes








URSULA CHU MD Apr 18, 2017 17:01

## 2017-04-18 NOTE — PDOC
PULMONARY PROGRESS NOTES


Subjective


feels better


Vitals





 Vital Signs








  Date Time  Temp Pulse Resp B/P Pulse Ox O2 Delivery O2 Flow Rate FiO2


 


4/18/17 15:35 98.3 81 22 130/71 96 Nasal Cannula  





 98.3       


 


4/18/17 11:44       3.0 








General:  Alert, No acute distress


Lungs:  Clear


Cardiovascular:  S1


Abdomen:  Soft, Other (obese)


Neuro Exam:  Alert


Extremities:  Other (trace edema)


Skin:  Warm


Labs





Laboratory Tests








Test


  4/17/17


05:05 4/17/17


05:35 4/17/17


06:20 4/17/17


07:10


 


White Blood Count


  15.0x10^3/uL


(4.0-11.0) 


  


  


 


 


Red Blood Count


  4.99x10^6/uL


(4.30-5.70) 


  


  


 


 


Hemoglobin


  13.1g/dL


(13.0-17.5) 


  


  


 


 


Hematocrit


  41.9%


(39.0-53.0) 


  


  


 


 


Mean Corpuscular Volume 84fL ()    


 


Mean Corpuscular Hemoglobin 26pg (25-35)    


 


Mean Corpuscular Hemoglobin


Concent 31g/dL (31-37) 


  


  


  


 


 


Red Cell Distribution Width


  15.1%


(11.5-14.5) 


  


  


 


 


Platelet Count


  252x10^3/uL


(140-400) 


  


  


 


 


Neutrophils (%) (Auto) 67% (31-73)    


 


Lymphocytes (%) (Auto) 18% (24-48)    


 


Monocytes (%) (Auto) 12% (0-9)    


 


Eosinophils (%) (Auto) 4% (0-3)    


 


Basophils (%) (Auto) 0% (0-3)    


 


Neutrophils # (Auto)


  10.0x10^3uL


(1.8-7.7) 


  


  


 


 


Lymphocytes # (Auto)


  2.7x10^3/uL


(1.0-4.8) 


  


  


 


 


Monocytes # (Auto)


  1.7x10^3/uL


(0.0-1.1) 


  


  


 


 


Eosinophils # (Auto)


  0.5x10^3/uL


(0.0-0.7) 


  


  


 


 


Basophils # (Auto)


  0.1x10^3/uL


(0.0-0.2) 


  


  


 


 


Sodium Level


  142mmol/L


(136-145) 


  


  


 


 


Potassium Level


  4.9mmol/L


(3.5-5.1) 


  


  


 


 


Chloride Level


  103mmol/L


() 


  


  


 


 


Carbon Dioxide Level


  28mmol/L


(21-32) 


  


  


 


 


Anion Gap 11 (6-14)    


 


Blood Urea Nitrogen 22mg/dL (8-26)    


 


Creatinine


  1.3mg/dL


(0.7-1.3) 


  


  


 


 


Estimated GFR


(Cockcroft-Gault) 55.2 


  


  


  


 


 


BUN/Creatinine Ratio 17 (6-20)    


 


Glucose Level


  257mg/dL


(70-99) 


  


  


 


 


Lactic Acid Level


  3.9mmol/L


(0.4-2.0) 


  


  


 


 


Calcium Level


  8.5mg/dL


(8.5-10.1) 


  


  


 


 


Total Bilirubin


  0.8mg/dL


(0.2-1.0) 


  


  


 


 


Aspartate Amino Transf


(AST/SGOT) 40U/L (15-37) 


  


  


  


 


 


Alanine Aminotransferase


(ALT/SGPT) 52U/L (16-63) 


  


  


  


 


 


Alkaline Phosphatase 86U/L ()    


 


Creatine Kinase


  560U/L


() 


  


  


 


 


Creatine Kinase MB (Mass)


  28.0ng/mL


(0.0-3.6) 


  


  


 


 


Creatine Kinase MB Relative


Index 5.0% (0-4) 


  


  


  


 


 


Troponin I Quantitative


  1.437ng/mL


(0.000-0.055) 


  


  


 


 


NT-Pro-B-Type Natriuretic


Peptide 2255pg/mL


(0-124) 


  


  


 


 


Total Protein


  7.5g/dL


(6.4-8.2) 


  


  


 


 


Albumin


  3.8g/dL


(3.4-5.0) 


  


  


 


 


Albumin/Globulin Ratio 1.0 (1.0-1.7)    


 


Urine Collection Type  Unknown   


 


Urine Color  Yellow   


 


Urine Clarity  Clear   


 


Urine pH  5.0   


 


Urine Specific Gravity  1.025   


 


Urine Protein


  


  30mg/dL


(NEG-TRACE) 


  


 


 


Urine Glucose (UA)  100mg/dL (NEG)   


 


Urine Ketones (Stick)


  


  Negativemg/dL


(NEG) 


  


 


 


Urine Blood  Negative (NEG)   


 


Urine Nitrite  Negative (NEG)   


 


Urine Bilirubin  Negative (NEG)   


 


Urine Urobilinogen Dipstick


  


  0.2mg/dL (0.2


mg/dL) 


  


 


 


Urine Leukocyte Esterase  Negative (NEG)   


 


Urine RBC  0/HPF (0-2)   


 


Urine WBC  Occ/HPF (0-4)   


 


Urine Squamous Epithelial


Cells 


  Few/LPF 


  


  


 


 


Urine Bacteria  0/HPF (0-FEW)   


 


Urine Hyaline Casts  Occasional/HPF   


 


Urine Mucus  Mod/LPF   


 


O2 Saturation   96% (92-99)  


 


Arterial Blood pH


  


  


  7.36


(7.35-7.45) 


 


 


Arterial Blood pCO2 at


Patient Temp 


  


  49mmHg (35-46) 


  


 


 


Arterial Blood pO2 at Patient


Temp 


  


  91mmHg


() 


 


 


Arterial Blood HCO3


  


  


  27mmol/L


(21-28) 


 


 


Arterial Blood Base Excess   1mmol/L (-3-3)  


 


FiO2   35.0  


 


Nasal Screen MRSA (PCR)


  


  


  


  Negative


(Negative)














Test


  4/17/17


09:05 4/17/17


10:50 4/17/17


11:49 4/17/17


13:50


 


Lactic Acid Level


  2.2mmol/L


(0.4-2.0) 


  


  0.9mmol/L


(0.4-2.0)


 


Troponin I Quantitative


  


  4.505ng/mL


(0.000-0.055) 


  


 


 


Glucose (Fingerstick)


  


  


  157mg/dL


(70-99) 


 














Test


  4/17/17


17:45 4/17/17


17:50 4/17/17


21:27 4/18/17


04:27


 


Glucose (Fingerstick)


  131mg/dL


(70-99) 


  195mg/dL


(70-99) 


 


 


Troponin I Quantitative


  


  6.405ng/mL


(0.000-0.055) 


  5.920ng/mL


(0.000-0.055)


 


White Blood Count


  


  


  


  11.2x10^3/uL


(4.0-11.0)


 


Red Blood Count


  


  


  


  5.46x10^6/uL


(4.30-5.70)


 


Hemoglobin


  


  


  


  14.3g/dL


(13.0-17.5)


 


Hematocrit


  


  


  


  43.7%


(39.0-53.0)


 


Mean Corpuscular Volume    80fL () 


 


Mean Corpuscular Hemoglobin    26pg (25-35) 


 


Mean Corpuscular Hemoglobin


Concent 


  


  


  33g/dL (31-37) 


 


 


Red Cell Distribution Width


  


  


  


  15.2%


(11.5-14.5)


 


Platelet Count


  


  


  


  222x10^3/uL


(140-400)


 


Neutrophils (%) (Auto)    79% (31-73) 


 


Lymphocytes (%) (Auto)    9% (24-48) 


 


Monocytes (%) (Auto)    10% (0-9) 


 


Eosinophils (%) (Auto)    2% (0-3) 


 


Basophils (%) (Auto)    0% (0-3) 


 


Neutrophils # (Auto)


  


  


  


  8.8x10^3uL


(1.8-7.7)


 


Lymphocytes # (Auto)


  


  


  


  1.1x10^3/uL


(1.0-4.8)


 


Monocytes # (Auto)


  


  


  


  1.1x10^3/uL


(0.0-1.1)


 


Eosinophils # (Auto)


  


  


  


  0.2x10^3/uL


(0.0-0.7)


 


Basophils # (Auto)


  


  


  


  0.0x10^3/uL


(0.0-0.2)


 


Sodium Level


  


  


  


  141mmol/L


(136-145)


 


Potassium Level


  


  


  


  3.8mmol/L


(3.5-5.1)


 


Chloride Level


  


  


  


  97mmol/L


()


 


Carbon Dioxide Level


  


  


  


  39mmol/L


(21-32)


 


Anion Gap    5 (6-14) 


 


Blood Urea Nitrogen    25mg/dL (8-26) 


 


Creatinine


  


  


  


  1.4mg/dL


(0.7-1.3)


 


Estimated GFR


(Cockcroft-Gault) 


  


  


  50.7 


 


 


Glucose Level


  


  


  


  153mg/dL


(70-99)


 


Calcium Level


  


  


  


  9.2mg/dL


(8.5-10.1)














Test


  4/18/17


07:42 4/18/17


11:33 


  


 


 


Glucose (Fingerstick)


  134mg/dL


(70-99) 197mg/dL


(70-99) 


  


 








Laboratory Tests








Test


  4/17/17


17:45 4/17/17


17:50 4/17/17


21:27 4/18/17


04:27


 


Glucose (Fingerstick)


  131mg/dL


(70-99) 


  195mg/dL


(70-99) 


 


 


Troponin I Quantitative


  


  6.405ng/mL


(0.000-0.055) 


  5.920ng/mL


(0.000-0.055)


 


White Blood Count


  


  


  


  11.2x10^3/uL


(4.0-11.0)


 


Red Blood Count


  


  


  


  5.46x10^6/uL


(4.30-5.70)


 


Hemoglobin


  


  


  


  14.3g/dL


(13.0-17.5)


 


Hematocrit


  


  


  


  43.7%


(39.0-53.0)


 


Mean Corpuscular Volume    80fL () 


 


Mean Corpuscular Hemoglobin    26pg (25-35) 


 


Mean Corpuscular Hemoglobin


Concent 


  


  


  33g/dL (31-37) 


 


 


Red Cell Distribution Width


  


  


  


  15.2%


(11.5-14.5)


 


Platelet Count


  


  


  


  222x10^3/uL


(140-400)


 


Neutrophils (%) (Auto)    79% (31-73) 


 


Lymphocytes (%) (Auto)    9% (24-48) 


 


Monocytes (%) (Auto)    10% (0-9) 


 


Eosinophils (%) (Auto)    2% (0-3) 


 


Basophils (%) (Auto)    0% (0-3) 


 


Neutrophils # (Auto)


  


  


  


  8.8x10^3uL


(1.8-7.7)


 


Lymphocytes # (Auto)


  


  


  


  1.1x10^3/uL


(1.0-4.8)


 


Monocytes # (Auto)


  


  


  


  1.1x10^3/uL


(0.0-1.1)


 


Eosinophils # (Auto)


  


  


  


  0.2x10^3/uL


(0.0-0.7)


 


Basophils # (Auto)


  


  


  


  0.0x10^3/uL


(0.0-0.2)


 


Sodium Level


  


  


  


  141mmol/L


(136-145)


 


Potassium Level


  


  


  


  3.8mmol/L


(3.5-5.1)


 


Chloride Level


  


  


  


  97mmol/L


()


 


Carbon Dioxide Level


  


  


  


  39mmol/L


(21-32)


 


Anion Gap    5 (6-14) 


 


Blood Urea Nitrogen    25mg/dL (8-26) 


 


Creatinine


  


  


  


  1.4mg/dL


(0.7-1.3)


 


Estimated GFR


(Cockcroft-Gault) 


  


  


  50.7 


 


 


Glucose Level


  


  


  


  153mg/dL


(70-99)


 


Calcium Level


  


  


  


  9.2mg/dL


(8.5-10.1)














Test


  4/18/17


07:42 4/18/17


11:33 


  


 


 


Glucose (Fingerstick)


  134mg/dL


(70-99) 197mg/dL


(70-99) 


  


 








Medications





Active Scripts








 Medications  Dose


 Route/Sig Days Date Category


 


 Stiolto Respimat


 Inhal Spray


  (Tiotropium


 Br/Olodaterol


 HCl) 4 Gm


 Mist.inhal  4 Gm


 IH DAILY   3/14/17 Reported


 


 Novolog Flexpen


  (Insulin Aspart)


 100 Unit/1 Ml


 Insuln.pen  1 Unit


 SQ   3/14/17 Reported


 


 Ventolin Hfa


 Inhaler


  (Albuterol


 Sulfate) 18 Gm


 Hfa.aer.ad  2 Puff


 INH QID   3/14/17 Reported


 


 Flovent 110MCG


 Hfa (Fluticasone


 Propionate) 12 Gm


 Aer.w.adap  2 Puff


 IH BID   3/14/17 Reported


 


 Losartan


 Potassium 25 Mg


 Tablet  100 Mg


 PO DAILY   3/14/17 Reported


 


 Levemir (Insulin


 Detemir) 100


 Unit/1 Ml Vial  70 Unit


 SQ QHS   3/14/17 Reported











Impression


.


1.  Acute-on-chronic hypoxemic respiratory failure secondary to acute-on-chronic


systolic heart failure.


2.  Acute-on-chronic systolic heart failure.


3.   hypertension.


4.  Elevated troponin.


5.  Type 2 diabetes.


6.  Leukocytosis, suspect reactive.


7.  Elevated liver chemistries suspect hepatic congestion.





Plan


.





1.  Continue diuresis.


2.  Cath in am


3.  Continue oxygen supplementation.


4.  No need for antibiotics or steroids.


5.  Consult dietary.


6.  Pt on home oxygen at 4 litres








ENOCH FAN MD Apr 18, 2017 15:49

## 2017-04-19 VITALS — SYSTOLIC BLOOD PRESSURE: 114 MMHG | DIASTOLIC BLOOD PRESSURE: 56 MMHG

## 2017-04-19 VITALS — SYSTOLIC BLOOD PRESSURE: 105 MMHG | DIASTOLIC BLOOD PRESSURE: 61 MMHG

## 2017-04-19 VITALS — SYSTOLIC BLOOD PRESSURE: 116 MMHG | DIASTOLIC BLOOD PRESSURE: 75 MMHG

## 2017-04-19 VITALS — SYSTOLIC BLOOD PRESSURE: 134 MMHG | DIASTOLIC BLOOD PRESSURE: 72 MMHG

## 2017-04-19 VITALS — SYSTOLIC BLOOD PRESSURE: 155 MMHG | DIASTOLIC BLOOD PRESSURE: 87 MMHG

## 2017-04-19 VITALS — SYSTOLIC BLOOD PRESSURE: 107 MMHG | DIASTOLIC BLOOD PRESSURE: 69 MMHG

## 2017-04-19 VITALS — SYSTOLIC BLOOD PRESSURE: 104 MMHG | DIASTOLIC BLOOD PRESSURE: 61 MMHG

## 2017-04-19 VITALS — DIASTOLIC BLOOD PRESSURE: 47 MMHG | SYSTOLIC BLOOD PRESSURE: 107 MMHG

## 2017-04-19 VITALS — SYSTOLIC BLOOD PRESSURE: 120 MMHG | DIASTOLIC BLOOD PRESSURE: 56 MMHG

## 2017-04-19 VITALS — DIASTOLIC BLOOD PRESSURE: 58 MMHG | SYSTOLIC BLOOD PRESSURE: 117 MMHG

## 2017-04-19 VITALS — SYSTOLIC BLOOD PRESSURE: 140 MMHG | DIASTOLIC BLOOD PRESSURE: 70 MMHG

## 2017-04-19 LAB
APTT BLD: 37 SEC (ref 24–38)
INR PPP: 1.2 (ref 0.8–1.1)
PROTHROMBIN TIME: 14 SEC (ref 11.7–14)

## 2017-04-19 RX ADMIN — ACETYLCYSTEINE SCH MG: 200 INHALANT RESPIRATORY (INHALATION) at 06:50

## 2017-04-19 RX ADMIN — IPRATROPIUM BROMIDE AND ALBUTEROL SULFATE SCH ML: .5; 3 SOLUTION RESPIRATORY (INHALATION) at 12:00

## 2017-04-19 RX ADMIN — FUROSEMIDE SCH MG: 20 TABLET ORAL at 08:32

## 2017-04-19 RX ADMIN — Medication SCH EA: at 20:24

## 2017-04-19 RX ADMIN — INSULIN ASPART SCH UNITS: 100 INJECTION, SOLUTION INTRAVENOUS; SUBCUTANEOUS at 17:00

## 2017-04-19 RX ADMIN — Medication SCH EA: at 08:32

## 2017-04-19 RX ADMIN — ENOXAPARIN SODIUM SCH MG: 80 INJECTION SUBCUTANEOUS at 06:50

## 2017-04-19 RX ADMIN — IPRATROPIUM BROMIDE AND ALBUTEROL SULFATE SCH ML: .5; 3 SOLUTION RESPIRATORY (INHALATION) at 16:16

## 2017-04-19 RX ADMIN — INSULIN ASPART SCH UNITS: 100 INJECTION, SOLUTION INTRAVENOUS; SUBCUTANEOUS at 07:37

## 2017-04-19 RX ADMIN — LOSARTAN POTASSIUM SCH MG: 50 TABLET ORAL at 08:33

## 2017-04-19 RX ADMIN — IPRATROPIUM BROMIDE AND ALBUTEROL SULFATE SCH ML: .5; 3 SOLUTION RESPIRATORY (INHALATION) at 08:21

## 2017-04-19 RX ADMIN — INSULIN DETEMIR SCH UNITS: 100 INJECTION, SOLUTION SUBCUTANEOUS at 21:26

## 2017-04-19 RX ADMIN — IPRATROPIUM BROMIDE AND ALBUTEROL SULFATE SCH ML: .5; 3 SOLUTION RESPIRATORY (INHALATION) at 20:09

## 2017-04-19 RX ADMIN — INSULIN ASPART SCH UNITS: 100 INJECTION, SOLUTION INTRAVENOUS; SUBCUTANEOUS at 12:00

## 2017-04-19 RX ADMIN — FLUTICASONE PROPIONATE SCH SPRAY: 50 SPRAY, METERED NASAL at 08:36

## 2017-04-19 RX ADMIN — ENOXAPARIN SODIUM SCH MG: 80 INJECTION SUBCUTANEOUS at 17:51

## 2017-04-19 RX ADMIN — INSULIN ASPART SCH UNITS: 100 INJECTION, SOLUTION INTRAVENOUS; SUBCUTANEOUS at 13:08

## 2017-04-19 RX ADMIN — INSULIN ASPART SCH UNITS: 100 INJECTION, SOLUTION INTRAVENOUS; SUBCUTANEOUS at 17:54

## 2017-04-19 RX ADMIN — INSULIN ASPART SCH UNITS: 100 INJECTION, SOLUTION INTRAVENOUS; SUBCUTANEOUS at 08:00

## 2017-04-19 NOTE — RAD
CT of the chest without contrast, 4/19/2017:



History: Preop evaluation for coronary artery disease with CABG



Noncontrast scans were obtained as requested. There is mild calcific plaquing

of the thoracic aorta without evidence of aneurysm. Moderate scattered

coronary artery calcifications are present. The heart is not enlarged. No

mediastinal adenopathy is seen.



There are mild emphysematous changes in the lungs. There are a few scattered

linear scars. There is a moderate linear opacity in the left lower lobe

compatible with a scar. There are several adjacent parenchymal calcifications

compatible with granulomas. Several other tiny calcified granulomata are

present in the right lung.



There is a 2.2 cm soft tissue nodule in the left lower lobe. It contains a

single slightly eccentric calcification. This does not necessarily mean that

this is a benign process as a neoplasm can engulf granulomas or independently

calcify. There is a thinner branching soft tissue density extending laterally

from this main nodule. This branching process is of lower density and could

represent debris in a obstructed, dilated bronchus. This process is best

delineated on images [id number] of series #3.



There is no evidence of pleural fluid



There are moderate scattered degenerative changes in the spine.





IMPRESSION:

1. Calcific plaquing of the aorta and coronary arteries.

2. Emphysema with parenchymal scarring.

3. Old healed granulomatous disease in the chest.

4. Left lower lobe pulmonary nodule as described above. Diagnostic

considerations include a giant granuloma or a neoplasm. PET/CT scanning may be

useful for further evaluation, if clinically indicated.











PQRS Compliance Statement:



One or more of the following individualized dose reduction techniques were

utilized for this examination:

1. Automated exposure control

2. Adjustment of the mA and/or kV according to patient size

3. Use of iterative reconstruction technique

## 2017-04-19 NOTE — PDOC
SUBJECTIVE


Subjective


Pt doing better this morning; muscle cramps have improved.  Heart 

catheterization this afternoon.





OBJECTIVE


Vital Signs





Vital Signs








  Date Time  Temp Pulse Resp B/P Pulse Ox O2 Delivery O2 Flow Rate FiO2


 


4/19/17 07:05 97.8 92 20 116/75 96 Nasal Cannula 4.0 





 97.8       


 


4/19/17 02:38 98.1 88 20  94 Nasal Cannula 4.0 





 98.1       


 


4/18/17 22:38 97.9 94 17 119/51 92 Room Air  





 97.9       


 


4/18/17 21:00     97 Nasal Cannula 3.0 


 


4/18/17 19:59 98.0  22 132/74 96 Nasal Cannula  





 98.0       


 


4/18/17 19:55      Nasal Cannula 3.0 


 


4/18/17 15:52     97 Nasal Cannula 3.0 


 


4/18/17 15:35 98.3 81 22 130/71 96 Nasal Cannula  





 98.3       


 


4/18/17 11:44     97 Nasal Cannula 3.0 


 


4/18/17 10:24 98.1 78 22 120/71 98 Nasal Cannula  





 98.1       


 


4/18/17 08:29  86  145/62    


 


4/18/17 08:00      Nasal Cannula 3.0 








I & O











 Intake and Output 


 


 4/19/17





 07:00


 


Intake Total 1320 ml


 


Output Total 2950 ml


 


Balance -1630 ml


 


 


 


Intake Oral 1320 ml


 


Output Urine Total 2950 ml











PHYSICAL EXAM


Physical Exam


General:  Alert, Oriented X3, Cooperative, NAD


HEENT:  Atraumatic, PERRLA, EOMI, Mucous membr. moist/pink


Lungs:  Clear to auscultation, Normal air movement


Heart:  RRR, no rubs, no gallops, no murmurs


Abdomen:  Normal bowel sounds, Other (distended)


Extremities:  No clubbing, No cyanosis, 1+ edema RLE 


Skin:  No rashes, No breakdown, No significant lesion


Neuro:  Normal tone, Sensation intact, Cranial nerves 3-12 NL


Psych/Mental Status:  Mental status NL, Mood NL





ASSESSMENT/PLAN


Assessment/Plan


Pt is a 67yo CM admitted for acute on chronic respiratory failure





1)Acute on Chronic Respiratory failure- 2/2 CHF exacerbation, resolved.  Pt had 

been receiving additional doses of Lasix, now back on normal dose of Lasix 

20mg.  Good net negative I/O's.  Cardiology and Pulmonology following





2)Accelerated HTN- pt was on Nitro gtt, improved on increased dose of Losartan 

50mg.  





3)Elevated troponin- with increase in trend.  Heart cath this afternoon





4)DM2- well controlled with HbA1C this admission of 7.  Pt continued on home 

insulin of Levemir 70 units QHS, and Novolog decreased to 30 units QAC.  Has 

SSI available.  





5)Leukocytosis- improving.  Pt afebrile and not having symptoms of infection.  

CTM





6)Elevated AST- likely 2/2 hepatic congestion, CTM





7)Acute on CKD- 2/2 diuretic use.  CTM.


Problems:  





COMMENT


Lab





Laboratory Tests








Test


  4/18/17


11:33 4/18/17


17:31 4/18/17


20:53 4/19/17


07:27


 


Glucose (Fingerstick)


  197mg/dL


(70-99) 83mg/dL


(70-99) 122mg/dL


(70-99) 188mg/dL


(70-99)














TAWNY NORIEGA MD Apr 19, 2017 07:48

## 2017-04-19 NOTE — CARD
--------------- APPROVED REPORT --------------





HISTORY

The patient is a 66 year-old male with a history of : coronary artery disease, chronic lung disease, 
tobacco history() , hypertension, dyslipidemia.



INDICATION

The indication(s) include : non-STEMI , chest pain, dyspnea.



CASE TECHNIQUE

The patient was brought electively into the cardiac catheterization lab. A timeout was performed conf
irming the patient's name, date of birth, procedure, and site of procedure. All necessary parties wer
e wearing the appropriate personal protective equipment and radiation monitoring devices. After expla
ining the risks and benefits of the procedure, informed consent was obtained.(See nursing notes for m
edications administered). The right groin was sterilely prepped and draped. The right femoral groin w
as infiltrated with 2% Lidocaine subcutaneous anesthesia. During this case, Fluoroscopy and low osmol
ar contrast were used for imaging. A sheath was inserted into the right femoral artery without diffic
ulty. Coronary angiography was performed using coronary diagnostic catheters. The left coronary syste
m was accessed and visualized with a Diagnostic catheter. The right coronary system was accessed and 
visualized with a Diagnostic catheter. The left ventricle was accessed and visualized with a Diagnost
ic catheter. Left ventricular/Aortic Valve gradient assessed on pullback. Left ventriculogram was per
formed in PEÑA projection. A Right Heart Catheterization was performed with a 7.5 Fr. Ramona-Mabel cathet
er and pressure were recorded. Pre-demployment femoral angiogram was performed . Closure device was d
eployed with a Angioseal without any complications. The patient tolerated the procedure well and ther
e were no complications associated with the procedure. 



Coronary Angiography

The patient's coronary anatomy is co-dominant.



The left main coronary artery is a large size vessel . There is a 45% stenosis in the ostial segment.
 The left main bifurcates to the left anterior descending and circumflex.



The left anterior descending artery is a large size vessel with stenosis. There is a 99% stenosis in 
the proximal segment. The first diagonal branch is a large size vessel with stenosis. There is a 95% 
stenosis in the proximal segment. The second diagonal branch is a large size vessel with intimal irre
gularities and without significant stenosis. The third diagonal branch is a small size vessel with in
timal irregularities and without significant stenosis. 



The circumflex artery is a medium size vessel with stenosis. There is a 80% stenosis in the proximal 
segment. The first obtuse marginal branch is a large size vessel with intimal irregularities and with
out significant stenosis. The second obtuse marginal branch is a large size vessel with intimal irreg
ularities and without significant stenosis. The third obtuse marginal branch is a medium size vessel 
with intimal irregularities and without significant stenosis. 



The right coronary artery is a large size vessel with intimal irregularities and without significant 
stenosis. The right posterior descending artery is a medium size vessel with intimal irregularities a
nd without significant stenosis. The right posterolateral branch is a small size vessel with intimal 
irregularities and without significant stenosis. 



Left Ventriculography

The left ventricle is mildly dilated in size with decreased contractility. The left ventricular eject
ion fraction is estimated to be 30%. The left ventricular end diastolic pressure is 23 mmHg. There wa
s no gradient across the aortic valve upon pullback.



Right Heart Cath Findings

The Right Atrial Pressure is 18 mmHg. The Right Ventricular Pressure is 54/12 mmHg. The Pulmonary Art
berry Pressure is 52/24 mmHg. The Pulmonary Catheter Wedge Pressure is 24 mmHg. 



Conclusion

This pt with significant CAD that has a LM equivalent disease needs a surgical consult for possible C
ABGS.



The pt's anterior wall flow may be compromised and this area may be hybernating.



Will get a viability study to asses this area of the myocardium

## 2017-04-19 NOTE — CARD
--------------- APPROVED REPORT --------------





EXAM: Two-dimensional and M-mode echocardiogram with Doppler and color Doppler.



Other Information 

Quality : Average

Rhythm : Atrial Fibrillation



INDICATION

Atrial Fibrillation

LV Function:Systolic



2D DIMENSIONS 

RVDd3.8 (2.9-3.5cm)Left Atrium(2D)4.7 (1.6-4.0cm)

IVSd1.1 (0.7-1.1cm)Aortic Root(2D)2.3 (2.0-3.7cm)

LVDd4.7 (3.9-5.9cm)LVOT Diameter2.3 (1.8-2.4cm)

PWd1.1 (0.7-1.1cm)LVDs3.8 (2.5-4.0cm)

FS (%) 18.9 %SV39.1 ml

LVEF(%)35.0 (>50%)



Aortic Valve

AoV Peak Gunnar.98.1cm/sAoV VTI19.8cm

AO Peak GR.3.9mmHgLVOT Peak Gunnar.98.5cm/s

AO Mean GR.2mmHgAVA (VMAX)4.20cm2



Mitral Valve

MV E Peak Gr.3mmHgMV E Mean Gr.1mmHg



Pulmonary Valve

PV Peak Wpoxbqsv213.1cm/s



 LEFT VENTRICLE 

The left ventricle is normal size. There is normal left ventricular wall thickness. Left ventricle sy
stolic function is impaired. The Ejection Fraction is 35%. Unable to assess diastolic function.



 RIGHT VENTRICLE 

The right ventricle is mildly dilated. The right ventricular systolic function is normal.



 ATRIA 

The left atrium size is normal. The right atrium size is normal. The interatrial septum is intact wit
h no evidence for an atrial septal defect or patent foramen ovale as noted on 2-D or Doppler imaging.




 AORTIC VALVE 

The aortic valve is normal in structure and function. The aortic valve is trileaflet. Doppler and Col
or Flow revealed no significant aortic regurgitation. There is no significant aortic valvular stenosi
s.



 MITRAL VALVE 

The mitral valve leaflets are thickened. There is no evidence of mitral valve prolapse. There is no m
itral valve stenosis. Doppler and Color Flow revealed no mitral valve regurgitation noted.



 TRICUSPID VALVE 

The tricuspid valve is normal in structure and function. Doppler and Color Flow revealed no tricuspid
 valve regurgitation noted. Unable to estimate PA pressure. There is no tricuspid valve stenosis.



 PULMONIC VALVE 

The pulmonary valve is normal in structure and function. Doppler and Color Flow revealed no pulmonic 
valvular regurgitation. There is no pulmonic valvular stenosis.



 GREAT VESSELS 

The aortic root is normal in size. The IVC is normal in size and collapses >50% with inspiration.



 PERICARDIAL EFFUSION 

There is a trace of pericardial effusion.



Critical Notification

Critical Value: No



<Conclusion>

Left ventricle systolic function is  impaired.

The Ejection Fraction is 35%.

The right ventricle is mildly dilated.

The right ventricular systolic function is normal.

The left atrium size is normal.

The right atrium size is normal.

The aortic valve is normal in structure and function.

The aortic valve is trileaflet.

The mitral valve leaflets are thickened.

Doppler and Color Flow revealed no mitral valve regurgitation noted.

Doppler and Color Flow revealed no tricuspid valve regurgitation noted. Unable to estimate PA pressur
e.

The pulmonary valve is normal in structure and function.

There is a trace of pericardial effusion.

## 2017-04-19 NOTE — PDOC
PROGRESS NOTES


Subjective


Subjective


Patient has no complaints. Feeling good today.





Objective


Objective





 Vital Signs








  Date Time  Temp Pulse Resp B/P Pulse Ox O2 Delivery O2 Flow Rate FiO2


 


4/19/17 12:17   16  97 Nasal Cannula 2.0 


 


4/19/17 12:14  88      


 


4/19/17 11:00 98.3   120/56    





 98.3       














 Intake and Output 


 


 4/19/17





 07:00


 


Intake Total 1320 ml


 


Output Total 2950 ml


 


Balance -1630 ml


 


 


 


Intake Oral 1320 ml


 


Output Urine Total 2950 ml











Physical Exam


Abdomen:  Normal bowel sounds, Soft


Heart:  Regular rate, Normal S1, Normal S2


Extremities:  No edema, Normal pulses


General:  Alert, Oriented X3, Cooperative


HEENT:  Atraumatic, EOMI


Lungs:  Clear to auscultation


Neuro:  Normal speech, Normal tone


Psych/Mental Status:  Mental status NL, Mood NL


Skin:  No rashes





Assessment


Assessment


 Problems


Medical Problems:


(1) Acute on chronic diastolic CHF (congestive heart failure)


Status: Acute  





(2) Acute on chronic respiratory failure


Status: Acute  





(3) Cardiac ischemia


Status: Acute  





(4) COPD exacerbation


Status: Acute  








Plan


Plan of Care


1. 


- March 2017 LV EF of 45% with functioning aortic and mitral valves and mild LV 

dysfunction


2. Cardiac cath today revealed significant Pulmonary HTN and CAD see the full 

dictated note.


    Viability Study pending.


- Dr. Gil consulted for CABG possibly scheduled for Friday.








Thank you for allowing me the pleasure of taking care of this patient.





Comment


Review of Relevant


I have reviewed the following items elfego (where applicable) has been applied.


Labs





Laboratory Tests








Test


  4/17/17


13:50 4/17/17


17:45 4/17/17


17:50 4/17/17


21:27


 


Lactic Acid Level


  0.9mmol/L


(0.4-2.0) 


  


  


 


 


Glucose (Fingerstick)


  


  131mg/dL


(70-99) 


  195mg/dL


(70-99)


 


Troponin I Quantitative


  


  


  6.405ng/mL


(0.000-0.055) 


 














Test


  4/18/17


04:27 4/18/17


07:42 4/18/17


11:33 4/18/17


17:31


 


White Blood Count


  11.2x10^3/uL


(4.0-11.0) 


  


  


 


 


Red Blood Count


  5.46x10^6/uL


(4.30-5.70) 


  


  


 


 


Hemoglobin


  14.3g/dL


(13.0-17.5) 


  


  


 


 


Hematocrit


  43.7%


(39.0-53.0) 


  


  


 


 


Mean Corpuscular Volume 80fL ()    


 


Mean Corpuscular Hemoglobin 26pg (25-35)    


 


Mean Corpuscular Hemoglobin


Concent 33g/dL (31-37) 


  


  


  


 


 


Red Cell Distribution Width


  15.2%


(11.5-14.5) 


  


  


 


 


Platelet Count


  222x10^3/uL


(140-400) 


  


  


 


 


Neutrophils (%) (Auto) 79% (31-73)    


 


Lymphocytes (%) (Auto) 9% (24-48)    


 


Monocytes (%) (Auto) 10% (0-9)    


 


Eosinophils (%) (Auto) 2% (0-3)    


 


Basophils (%) (Auto) 0% (0-3)    


 


Neutrophils # (Auto)


  8.8x10^3uL


(1.8-7.7) 


  


  


 


 


Lymphocytes # (Auto)


  1.1x10^3/uL


(1.0-4.8) 


  


  


 


 


Monocytes # (Auto)


  1.1x10^3/uL


(0.0-1.1) 


  


  


 


 


Eosinophils # (Auto)


  0.2x10^3/uL


(0.0-0.7) 


  


  


 


 


Basophils # (Auto)


  0.0x10^3/uL


(0.0-0.2) 


  


  


 


 


Sodium Level


  141mmol/L


(136-145) 


  


  


 


 


Potassium Level


  3.8mmol/L


(3.5-5.1) 


  


  


 


 


Chloride Level


  97mmol/L


() 


  


  


 


 


Carbon Dioxide Level


  39mmol/L


(21-32) 


  


  


 


 


Anion Gap 5 (6-14)    


 


Blood Urea Nitrogen 25mg/dL (8-26)    


 


Creatinine


  1.4mg/dL


(0.7-1.3) 


  


  


 


 


Estimated GFR


(Cockcroft-Gault) 50.7 


  


  


  


 


 


Glucose Level


  153mg/dL


(70-99) 


  


  


 


 


Hemoglobin A1c 7.0% (4.8-5.6)    


 


Calcium Level


  9.2mg/dL


(8.5-10.1) 


  


  


 


 


Troponin I Quantitative


  5.920ng/mL


(0.000-0.055) 


  


  


 


 


Glucose (Fingerstick)


  


  134mg/dL


(70-99) 197mg/dL


(70-99) 83mg/dL


(70-99)














Test


  4/18/17


20:53 4/19/17


07:27 4/19/17


12:37 


 


 


Glucose (Fingerstick)


  122mg/dL


(70-99) 188mg/dL


(70-99) 83mg/dL


(70-99) 


 








Laboratory Tests








Test


  4/18/17


17:31 4/18/17


20:53 4/19/17


07:27 4/19/17


12:37


 


Glucose (Fingerstick)


  83mg/dL


(70-99) 122mg/dL


(70-99) 188mg/dL


(70-99) 83mg/dL


(70-99)








Microbiology


4/17/17 Blood Culture - Preliminary, Resulted


          NO GROWTH AFTER 2 DAYS


Medications





 Current Medications


Nitroglycerin/ Dextrose (Nitroglycerin Drip) 250 ml @ 0 mls/hr CONT  PRN IV SEE 

I/O RECORD Last administered on 4/17/17at 05:15;  Start 4/17/17 at 05:15


Albuterol/ Ipratropium (Duoneb) 6 ml 1X  ONCE NEB  Last administered on 4/17/ 17at 05:17;  Start 4/17/17 at 05:30;  Stop 4/17/17 at 05:31;  Status DC


Furosemide (Lasix) 60 mg 1X  ONCE IVP  Last administered on 4/17/17at 06:21;  

Start 4/17/17 at 06:30;  Stop 4/17/17 at 06:31;  Status DC


Ondansetron HCl 4 mg 4 mg PRN Q8HRS  PRN IV NAUSEA/VOMITING;  Start 4/17/17 at 

06:15;  Stop 4/18/17 at 06:14;  Status DC


Sodium Chloride (Iv Sodium Chloride 0.9% 1000ml Bag) 1,000 ml @  30 mls/hr Q24H 

IV  Last administered on 4/17/17at 06:21;  Start 4/17/17 at 06:30;  Stop 4/18/ 17 at 06:29;  Status DC


Acetaminophen (Tylenol) 650 mg PRN Q4HRS  PRN PO FEVER Last administered on 4/17 /17at 20:35;  Start 4/17/17 at 06:15;  Stop 4/18/17 at 06:14;  Status DC


Albuterol/ Ipratropium (Duoneb) 3 ml RTQID NEB  Last administered on 4/18/17at 

07:39;  Start 4/17/17 at 08:00;  Stop 4/18/17 at 07:59;  Status DC


Fluticasone Propionate (Flonase) 2 spray DAILY NS  Last administered on 4/18/ 17at 09:53;  Start 4/18/17 at 09:00


Non-Formulary Medication 4 gm DAILY IH ;  Start 4/18/17 at 09:00;  Status UNV


Insulin Aspart (Novolog) 25 units TIDAC SQ ;  Start 4/17/17 at 11:30;  Stop 4/17 /17 at 11:55;  Status DC


Losartan Potassium (Cozaar) 25 mg DAILY PO ;  Start 4/18/17 at 09:00;  Stop 4/18 /17 at 09:00;  Status DC


Albuterol Sulfate (Ventolin Neb Soln) 2.5 mg PRN Q4HRS  PRN NEB SHORTNESS OF 

BREATH;  Start 4/17/17 at 09:15


Insulin Detemir (Levemir) 70 units QHS SQ  Last administered on 4/18/17at 21:20

;  Start 4/17/17 at 21:00


Losartan Potassium (Cozaar) 25 mg DAILY PO  Last administered on 4/17/17at 10:04

;  Start 4/17/17 at 09:15;  Stop 4/18/17 at 07:42;  Status DC


Furosemide (Lasix) 20 mg DAILY PO  Last administered on 4/19/17at 08:32;  Start 

4/17/17 at 09:15


Insulin Aspart (Novolog) 0-7 UNITS TIDWMEALS SQ  Last administered on 4/18/17at 

13:14;  Start 4/17/17 at 12:00


Dextrose (Dextrose 50%-Water Syringe) 12.5 gm PRN Q15MIN  PRN IV SEE COMMENTS;  

Start 4/17/17 at 09:15


Insulin Aspart (Novolog) 15 units TIDAC SQ  Last administered on 4/18/17at 13:13

;  Start 4/17/17 at 12:00;  Stop 4/18/17 at 14:29;  Status DC


Furosemide (Lasix) 80 mg Q12HR PO  Last administered on 4/18/17at 21:13;  Start 

4/17/17 at 21:00;  Stop 4/19/17 at 00:00;  Status DC


Furosemide (Lasix) 40 mg 1X  ONCE PO  Last administered on 4/17/17at 13:13;  

Start 4/17/17 at 12:45;  Stop 4/17/17 at 12:50;  Status DC


Furosemide (Lasix) 20 mg 1X  ONCE PO ;  Start 4/17/17 at 12:45;  Stop 4/17/17 

at 12:46;  Status Cancel


Enoxaparin Sodium (Lovenox 80mg Syringe) 80 mg Q12H SQ  Last administered on 4/ 19/17at 06:50;  Start 4/17/17 at 17:00


Cyclobenzaprine HCl (Flexeril) 5 mg PRN Q6HRS  PRN PO MUSCLE SPASMS;  Start 4/18 /17 at 07:45


Losartan Potassium (Cozaar) 50 mg DAILY PO  Last administered on 4/19/17at 08:33

;  Start 4/18/17 at 09:00


Albuterol/ Ipratropium (Duoneb) 3 ml RTQID NEB  Last administered on 4/19/17at 

08:21;  Start 4/18/17 at 12:00


Insulin Aspart (Novolog) 30 units TIDAC SQ  Last administered on 4/19/17at 13:08

;  Start 4/18/17 at 16:30


Acetylcysteine (Mucomyst 20% Oral Solution) 600 mg Q8H PO  Last administered on 

4/19/17at 06:50;  Start 4/18/17 at 22:00;  Stop 4/19/17 at 06:01;  Status DC


Non-Formulary Medication 1 ea BID INH  Last administered on 4/19/17at 08:32;  

Start 4/18/17 at 21:00


Acetaminophen 650 mg 650 mg PRN Q6HRS  PRN PO HEADACHE Last administered on 4/18 /17at 19:50;  Start 4/18/17 at 19:45


Heparin Sodium/ Sodium Chloride 1,000 ml @  As Directed STK-MED ONCE .ROUTE ;  

Start 4/19/17 at 08:52;  Stop 4/19/17 at 08:53;  Status DC


Lidocaine HCl 20 ml STK-MED ONCE .ROUTE ;  Start 4/19/17 at 08:52;  Stop 4/19/ 17 at 08:53;  Status DC


Iodixanol (Visipaque 320) 100 ml STK-MED ONCE .ROUTE ;  Start 4/19/17 at 08:52;

  Stop 4/19/17 at 08:53;  Status DC


Fentanyl Citrate (Fentanyl 5ml Vial) 250 mcg STK-MED ONCE .ROUTE ;  Start 4/19/ 17 at 11:18;  Stop 4/19/17 at 11:19;  Status DC


Midazolam HCl (Versed) 5 mg STK-MED ONCE .ROUTE ;  Start 4/19/17 at 11:18;  

Stop 4/19/17 at 11:19;  Status DC


Heparin Sodium/ Sodium Chloride 1,000 unit 1X  ONCE IART  Last administered on 4 /19/17at 12:16;  Start 4/19/17 at 11:30;  Stop 4/19/17 at 11:31;  Status DC


Midazolam HCl (Versed) 5 mg 1X  ONCE IV  Last administered on 4/19/17at 12:17;  

Start 4/19/17 at 11:30;  Stop 4/19/17 at 11:31;  Status DC


Fentanyl Citrate (Fentanyl 5ml Vial) 250 mcg 1X  ONCE IV  Last administered on 4 /19/17at 12:17;  Start 4/19/17 at 11:30;  Stop 4/19/17 at 11:31;  Status DC


Iodixanol (Visipaque 320) 100 ml 1X  ONCE IART  Last administered on 4/19/17at 

12:16;  Start 4/19/17 at 11:30;  Stop 4/19/17 at 11:31;  Status DC


Lidocaine HCl 20 ml 1X  ONCE IJ  Last administered on 4/19/17at 12:16;  Start 4/ 19/17 at 11:30;  Stop 4/19/17 at 11:31;  Status DC





Active Scripts


Active


Reported


Stiolto Respimat Inhal Spray (Tiotropium Br/Olodaterol HCl) 4 Gm Mist.inhal 4 

Gm IH DAILY


Novolog Flexpen (Insulin Aspart) 100 Unit/1 Ml Insuln.pen 1 Unit SQ 


Ventolin Hfa Inhaler (Albuterol Sulfate) 18 Gm Hfa.aer.ad 2 Puff INH QID


Flovent 110MCG Hfa (Fluticasone Propionate) 12 Gm Aer.w.adap 2 Puff IH BID


Losartan Potassium 25 Mg Tablet 100 Mg PO DAILY


Levemir (Insulin Detemir) 100 Unit/1 Ml Vial 70 Unit SQ QHS


Vitals/I & O





 Vital Sign - Last 24 Hours








 4/18/17 4/18/17 4/18/17 4/18/17





 15:35 15:52 19:55 19:59


 


Temp 98.3   98.0





 98.3   98.0


 


Pulse 81   


 


Resp 22   22


 


B/P 130/71   132/74


 


Pulse Ox 96 97  96


 


O2 Delivery Nasal Cannula Nasal Cannula Nasal Cannula Nasal Cannula


 


O2 Flow Rate  3.0 3.0 


 


    





    





 4/18/17 4/18/17 4/19/17 4/19/17





 21:00 22:38 02:38 07:05


 


Temp  97.9 98.1 97.8





  97.9 98.1 97.8


 


Pulse  94 88 92


 


Resp  17 20 20


 


B/P  119/51  116/75


 


Pulse Ox 97 92 94 96


 


O2 Delivery Nasal Cannula Room Air Nasal Cannula Nasal Cannula


 


O2 Flow Rate 3.0  4.0 4.0


 


    





    





 4/19/17 4/19/17 4/19/17 4/19/17





 08:00 08:23 08:33 11:00


 


Temp    98.3





    98.3


 


Pulse   83 79


 


Resp    20


 


B/P   116/75 120/56


 


Pulse Ox  97  97


 


O2 Delivery Nasal Cannula Nasal Cannula  Nasal Cannula


 


O2 Flow Rate 3.0 3.0  4.0


 


    





    





 4/19/17 4/19/17  





 12:14 12:17  


 


Pulse 88   


 


Resp 16 16  


 


Pulse Ox 97 97  


 


O2 Delivery Nasal Cannula Nasal Cannula  


 


O2 Flow Rate 3.0 2.0  














 Intake and Output   


 


 4/18/17 4/18/17 4/19/17





 15:00 23:00 07:00


 


Intake Total 120 ml 1200 ml 


 


Output Total  2000 ml 950 ml


 


Balance 120 ml -800 ml -950 ml














URSULA CHU MD Apr 19, 2017 13:37

## 2017-04-19 NOTE — PDOC2
CONSULT


Date of Consult


Date of Consult


DATE: 4/19/17 


TIME: 14:52





Reason for Consult


Reason for Consult:


Coronary artery disease





Referring Physician


Referring Physician:


Dr. Eastman





Identification/Chief Complaint


Chief Complaint


Shortness of breath





Source


Source:  Chart review, Patient





History of Present Illness


Reason for Visit:


The patient is a pleasant 66-year-old male who presented to our emergency room 

2 days ago with increasing shortness of breath. He was found to be hypoxic, 

with a chest x-ray showing pulmonary edema. He was treated with noninvasive 

ventilation and diuresis and his breathing is now much better. He's been having 

issues with shortness of breath over the past month. He had a recent admission 

with an analogous episode. It appears that most of his respiratory symptoms 

have occurred over the past 2-3 months. Up until January 2017 he was very 

active and was still working. Since his last admission 2 weeks ago he's been 

taking oxygen at home. He reports occasional angina for some time. On this 

admission he had a small troponin leak, but no EKG changes. He underwent a 

coronary angiogram today by Dr. Eastman which demonstrated a very tight 

proximal LAD lesion, a tight stenosis of the proximal left circumflex and of a 

relatively large diagonal branch. There is also a 30-40% stenosis of the ostial 

left main stem. The right system does not have any significant occlusive 

disease. On admission he had a limited transthoracic echo which showed an EF of 

40-45%. The left ventricular function looks a little worse on his LV gram and 

is probably in the 25-30% range. I was consulted to consider the patient for 

surgical coronary revascularization.





Past Medical History


Cardiovascular:  CHF, HTN


Pulmonary:  COPD


GI:  No pertinent hx


Heme/Onc:  No pertinent hx


Hepatobiliary:  No pertinent hx


Psych:  No pertinent hx


Musculoskeletal:  Osteoarthritis


Rheumatologic:  No pertinent hx


Infectious disease:  No pertinent hx


ENT:  No pertinent hx


Renal/:  No pertinent hx


Endocrine:  Diabetes


Dermatology:  No pertinent hx





Past Surgical History


Past Surgical History:  Other (Rotator Cuff)





Family History


Family History:  Heart Disease





Social History


Quit


ALCOHOL:  none


Drugs:  None





Current Problem List


Problem List


 Problems


Medical Problems:


(1) Acute on chronic diastolic CHF (congestive heart failure)


Status: Acute  





(2) Acute on chronic respiratory failure


Status: Acute  





(3) Cardiac ischemia


Status: Acute  





(4) COPD exacerbation


Status: Acute  











Current Medications


Current Medications





 Current Medications


Nitroglycerin/ Dextrose (Nitroglycerin Drip) 250 ml @ 0 mls/hr CONT  PRN IV SEE 

I/O RECORD Last administered on 4/17/17at 05:15;  Start 4/17/17 at 05:15


Albuterol/ Ipratropium (Duoneb) 6 ml 1X  ONCE NEB  Last administered on 4/17/ 17at 05:17;  Start 4/17/17 at 05:30;  Stop 4/17/17 at 05:31;  Status DC


Furosemide (Lasix) 60 mg 1X  ONCE IVP  Last administered on 4/17/17at 06:21;  

Start 4/17/17 at 06:30;  Stop 4/17/17 at 06:31;  Status DC


Ondansetron HCl 4 mg 4 mg PRN Q8HRS  PRN IV NAUSEA/VOMITING;  Start 4/17/17 at 

06:15;  Stop 4/18/17 at 06:14;  Status DC


Sodium Chloride (Iv Sodium Chloride 0.9% 1000ml Bag) 1,000 ml @  30 mls/hr Q24H 

IV  Last administered on 4/17/17at 06:21;  Start 4/17/17 at 06:30;  Stop 4/18/ 17 at 06:29;  Status DC


Acetaminophen (Tylenol) 650 mg PRN Q4HRS  PRN PO FEVER Last administered on 4/17 /17at 20:35;  Start 4/17/17 at 06:15;  Stop 4/18/17 at 06:14;  Status DC


Albuterol/ Ipratropium (Duoneb) 3 ml RTQID NEB  Last administered on 4/18/17at 

07:39;  Start 4/17/17 at 08:00;  Stop 4/18/17 at 07:59;  Status DC


Fluticasone Propionate (Flonase) 2 spray DAILY NS  Last administered on 4/18/ 17at 09:53;  Start 4/18/17 at 09:00


Non-Formulary Medication 4 gm DAILY IH ;  Start 4/18/17 at 09:00;  Status UNV


Insulin Aspart (Novolog) 25 units TIDAC SQ ;  Start 4/17/17 at 11:30;  Stop 4/17 /17 at 11:55;  Status DC


Losartan Potassium (Cozaar) 25 mg DAILY PO ;  Start 4/18/17 at 09:00;  Stop 4/18 /17 at 09:00;  Status DC


Albuterol Sulfate (Ventolin Neb Soln) 2.5 mg PRN Q4HRS  PRN NEB SHORTNESS OF 

BREATH;  Start 4/17/17 at 09:15


Insulin Detemir (Levemir) 70 units QHS SQ  Last administered on 4/18/17at 21:20

;  Start 4/17/17 at 21:00


Losartan Potassium (Cozaar) 25 mg DAILY PO  Last administered on 4/17/17at 10:04

;  Start 4/17/17 at 09:15;  Stop 4/18/17 at 07:42;  Status DC


Furosemide (Lasix) 20 mg DAILY PO  Last administered on 4/19/17at 08:32;  Start 

4/17/17 at 09:15


Insulin Aspart (Novolog) 0-7 UNITS TIDWMEALS SQ  Last administered on 4/18/17at 

13:14;  Start 4/17/17 at 12:00


Dextrose (Dextrose 50%-Water Syringe) 12.5 gm PRN Q15MIN  PRN IV SEE COMMENTS;  

Start 4/17/17 at 09:15


Insulin Aspart (Novolog) 15 units TIDAC SQ  Last administered on 4/18/17at 13:13

;  Start 4/17/17 at 12:00;  Stop 4/18/17 at 14:29;  Status DC


Furosemide (Lasix) 80 mg Q12HR PO  Last administered on 4/18/17at 21:13;  Start 

4/17/17 at 21:00;  Stop 4/19/17 at 00:00;  Status DC


Furosemide (Lasix) 40 mg 1X  ONCE PO  Last administered on 4/17/17at 13:13;  

Start 4/17/17 at 12:45;  Stop 4/17/17 at 12:50;  Status DC


Furosemide (Lasix) 20 mg 1X  ONCE PO ;  Start 4/17/17 at 12:45;  Stop 4/17/17 

at 12:46;  Status Cancel


Enoxaparin Sodium (Lovenox 80mg Syringe) 80 mg Q12H SQ  Last administered on 4/ 19/17at 06:50;  Start 4/17/17 at 17:00


Cyclobenzaprine HCl (Flexeril) 5 mg PRN Q6HRS  PRN PO MUSCLE SPASMS;  Start 4/18 /17 at 07:45


Losartan Potassium (Cozaar) 50 mg DAILY PO  Last administered on 4/19/17at 08:33

;  Start 4/18/17 at 09:00


Albuterol/ Ipratropium (Duoneb) 3 ml RTQID NEB  Last administered on 4/19/17at 

08:21;  Start 4/18/17 at 12:00


Insulin Aspart (Novolog) 30 units TIDAC SQ  Last administered on 4/19/17at 13:08

;  Start 4/18/17 at 16:30


Acetylcysteine (Mucomyst 20% Oral Solution) 600 mg Q8H PO  Last administered on 

4/19/17at 06:50;  Start 4/18/17 at 22:00;  Stop 4/19/17 at 06:01;  Status DC


Non-Formulary Medication 1 ea BID INH  Last administered on 4/19/17at 08:32;  

Start 4/18/17 at 21:00


Acetaminophen 650 mg 650 mg PRN Q6HRS  PRN PO HEADACHE Last administered on 4/18 /17at 19:50;  Start 4/18/17 at 19:45


Heparin Sodium/ Sodium Chloride 1,000 ml @  As Directed STK-MED ONCE .ROUTE ;  

Start 4/19/17 at 08:52;  Stop 4/19/17 at 08:53;  Status DC


Lidocaine HCl 20 ml STK-MED ONCE .ROUTE ;  Start 4/19/17 at 08:52;  Stop 4/19/ 17 at 08:53;  Status DC


Iodixanol (Visipaque 320) 100 ml STK-MED ONCE .ROUTE ;  Start 4/19/17 at 08:52;

  Stop 4/19/17 at 08:53;  Status DC


Fentanyl Citrate (Fentanyl 5ml Vial) 250 mcg STK-MED ONCE .ROUTE ;  Start 4/19/ 17 at 11:18;  Stop 4/19/17 at 11:19;  Status DC


Midazolam HCl (Versed) 5 mg STK-MED ONCE .ROUTE ;  Start 4/19/17 at 11:18;  

Stop 4/19/17 at 11:19;  Status DC


Heparin Sodium/ Sodium Chloride 1,000 unit 1X  ONCE IART  Last administered on 4 /19/17at 12:16;  Start 4/19/17 at 11:30;  Stop 4/19/17 at 11:31;  Status DC


Midazolam HCl (Versed) 5 mg 1X  ONCE IV  Last administered on 4/19/17at 12:17;  

Start 4/19/17 at 11:30;  Stop 4/19/17 at 11:31;  Status DC


Fentanyl Citrate (Fentanyl 5ml Vial) 250 mcg 1X  ONCE IV  Last administered on 4 /19/17at 12:17;  Start 4/19/17 at 11:30;  Stop 4/19/17 at 11:31;  Status DC


Iodixanol (Visipaque 320) 100 ml 1X  ONCE IART  Last administered on 4/19/17at 

12:16;  Start 4/19/17 at 11:30;  Stop 4/19/17 at 11:31;  Status DC


Lidocaine HCl 20 ml 1X  ONCE IJ  Last administered on 4/19/17at 12:16;  Start 4/ 19/17 at 11:30;  Stop 4/19/17 at 11:31;  Status DC





Active Scripts


Active


Reported


Stiolto Respimat Inhal Spray (Tiotropium Br/Olodaterol HCl) 4 Gm Mist.inhal 4 

Gm IH DAILY


Novolog Flexpen (Insulin Aspart) 100 Unit/1 Ml Insuln.pen 1 Unit SQ 


Ventolin Hfa Inhaler (Albuterol Sulfate) 18 Gm Hfa.aer.ad 2 Puff INH QID


Flovent 110MCG Hfa (Fluticasone Propionate) 12 Gm Aer.w.adap 2 Puff IH BID


Losartan Potassium 25 Mg Tablet 100 Mg PO DAILY


Levemir (Insulin Detemir) 100 Unit/1 Ml Vial 70 Unit SQ QHS





Allergies


Allergies:  


Coded Allergies:  


     No Known Drug Allergies (Unverified , 10/3/14)





ROS


General:  No: Appetite, Chills, Fatigue, Malaise, Night Sweats


PSYCHOLOGICAL ROS:  No: Anxiety, Behavioral Disorder, Concentration difficultie

, Decreased libido, Depression, Disorientation, Hallucinations, Hostility, 

Irritablity, Memory difficulties, Mood Swings, Obsessive thoughts, Physical 

abuse, Sexual abuse, Sleep disturbances, Suicidal ideation


Eyes:  No Blurry vision, No Decreased vision, No Double vision, No Dry eyes, No 

Excessive tearing, No Eye Pain, No Itchy Eyes, No Loss of vision, No Photophobia

, No Scotomata, No Uses contacts, No Uses glasses


HEENT:  No: Epistaxis, Heacaches, Hearing change, Nasal congestion, Nasal 

discharge, Oral lesions, Sinus pain, Sneezing, Snoring, Sore Throat, Tinnitus, 

Vertigo, Visual Changes, Vocal changes


ALLERGY AND IMMUNOLOGY:  No: Hives, Insect Bite Sensitivity, Itchy/Watery Eyes, 

Nasal Congestion, Post Nasal Drip, Seasonal Allergies


Hematological and Lymphatic:  No: Bleeding Problems, Blood Clots, Blood 

Transfusions, Brusing, Night Sweats, Pallor, Swollen Lymph Nodes


ENDOCRINE:  No: Breast Changes, Galactorrhea, Hair Pattern Changes, Hot Flashes

, Malaise/lethargy, Mood Swings, Palpitations, Polydipsia/polyuria, Skin Changes

, Temperature Intolerance, Unexpected Weight Changes


Respiratory:  YES: Shortness of breath, 


   No: Cough, Hemoptysis, Orthopnea, Pleuritic Pain, SOB with excertion, Sputum 

Changes, Stridor, Tachypnea, Wheezing


Cardiovascular:  yes Chest Pain, yes Edema, yes Orthopnea, yes Palpitations, 


   No Lt Headedness, No Paroxysmal Noc. Dyspnea


Gastrointestinal:  No Abdominal Pain, No Constipation, No Diarrhea, No 

Hematochezia, No Melena, No Nausea, No Vomiting


Genitourinary:  No Discharge, No Dysuria, No Flank Pain, No Frequency, No 

Hematuria, No Incontinence, No Pain, No Retention, No Urgency


Musculoskeletal:  No Gait Disturbance, No Joint Pain, No Joint Stiffness, No 

Joint Swelling, No Muscle Pain, No Muscular Weakness, No Pain In:, No Swelling 

In:


Neurological:  No Behavorial Changes, No Bowel/Bladder ControlChng, No Confusion

, No Dizziness, No Gait Disturbance, No Headaches, No Impaired Coord/balance, 

No Memory Loss, No Numbness/Tingling, No Seizures, No Speech Problems, No 

Tremors, No Visual Changes, No Weakness


Skin:  No Acne, No Dry Skin, No Eczema, No Hair Changes, No Lumps, No Mole 

Changes, No Mottling, No Nail Changes, No Pruritus, No Rash, No Skin Lesion 

Changes





Physical Exam


General:  Alert, Oriented X3, No acute distress


HEENT:  Atraumatic, PERRLA


Lungs:  Clear to auscultation


Heart:  Regular rate, Normal S1, Normal S2


Abdomen:  Soft, No tenderness


Extremities:  No edema, Normal pulses


Skin:  No significant lesion


Neuro:  Normal gait, Normal speech, Strength at 5/5 X4 ext, Normal tone, 

Sensation intact, Cranial nerves 3-12 NL


Psych/Mental Status:  Mental status NL


MUSCULOSKELETAL:  No deformity





Vitals


VITALS





 Vital Signs








  Date Time  Temp Pulse Resp B/P Pulse Ox O2 Delivery O2 Flow Rate FiO2


 


4/19/17 12:47   18   Nasal Cannula  


 


4/19/17 12:17     97  2.0 


 


4/19/17 12:14  88      


 


4/19/17 11:00 98.3   120/56    





 98.3       











Labs


Labs





Laboratory Tests








Test


  4/17/17


17:45 4/17/17


17:50 4/17/17


21:27 4/18/17


04:27


 


Glucose (Fingerstick)


  131mg/dL


(70-99) 


  195mg/dL


(70-99) 


 


 


Troponin I Quantitative


  


  6.405ng/mL


(0.000-0.055) 


  5.920ng/mL


(0.000-0.055)


 


White Blood Count


  


  


  


  11.2x10^3/uL


(4.0-11.0)


 


Red Blood Count


  


  


  


  5.46x10^6/uL


(4.30-5.70)


 


Hemoglobin


  


  


  


  14.3g/dL


(13.0-17.5)


 


Hematocrit


  


  


  


  43.7%


(39.0-53.0)


 


Mean Corpuscular Volume    80fL () 


 


Mean Corpuscular Hemoglobin    26pg (25-35) 


 


Mean Corpuscular Hemoglobin


Concent 


  


  


  33g/dL (31-37) 


 


 


Red Cell Distribution Width


  


  


  


  15.2%


(11.5-14.5)


 


Platelet Count


  


  


  


  222x10^3/uL


(140-400)


 


Neutrophils (%) (Auto)    79% (31-73) 


 


Lymphocytes (%) (Auto)    9% (24-48) 


 


Monocytes (%) (Auto)    10% (0-9) 


 


Eosinophils (%) (Auto)    2% (0-3) 


 


Basophils (%) (Auto)    0% (0-3) 


 


Neutrophils # (Auto)


  


  


  


  8.8x10^3uL


(1.8-7.7)


 


Lymphocytes # (Auto)


  


  


  


  1.1x10^3/uL


(1.0-4.8)


 


Monocytes # (Auto)


  


  


  


  1.1x10^3/uL


(0.0-1.1)


 


Eosinophils # (Auto)


  


  


  


  0.2x10^3/uL


(0.0-0.7)


 


Basophils # (Auto)


  


  


  


  0.0x10^3/uL


(0.0-0.2)


 


Sodium Level


  


  


  


  141mmol/L


(136-145)


 


Potassium Level


  


  


  


  3.8mmol/L


(3.5-5.1)


 


Chloride Level


  


  


  


  97mmol/L


()


 


Carbon Dioxide Level


  


  


  


  39mmol/L


(21-32)


 


Anion Gap    5 (6-14) 


 


Blood Urea Nitrogen    25mg/dL (8-26) 


 


Creatinine


  


  


  


  1.4mg/dL


(0.7-1.3)


 


Estimated GFR


(Cockcroft-Gault) 


  


  


  50.7 


 


 


Glucose Level


  


  


  


  153mg/dL


(70-99)


 


Hemoglobin A1c    7.0% (4.8-5.6) 


 


Calcium Level


  


  


  


  9.2mg/dL


(8.5-10.1)














Test


  4/18/17


07:42 4/18/17


11:33 4/18/17


17:31 4/18/17


20:53


 


Glucose (Fingerstick)


  134mg/dL


(70-99) 197mg/dL


(70-99) 83mg/dL


(70-99) 122mg/dL


(70-99)














Test


  4/19/17


07:27 4/19/17


12:37 


  


 


 


Glucose (Fingerstick)


  188mg/dL


(70-99) 83mg/dL


(70-99) 


  


 








Laboratory Tests








Test


  4/18/17


17:31 4/18/17


20:53 4/19/17


07:27 4/19/17


12:37


 


Glucose (Fingerstick)


  83mg/dL


(70-99) 122mg/dL


(70-99) 188mg/dL


(70-99) 83mg/dL


(70-99)











Assessment/Plan


Assessment/Plan


The patient is a 66-year-old male, admitted with a non-STEMI with symptoms 

predominantly of shortness of breath. His symptoms have been present over the 

past 2-3 months. He is a diabetic, and apparently has a history of COPD. 

Coronary angiography today demonstrated a very tight proximal LAD lesion and a 

tight proximal stenosis of a large diagonal and left circumflex. The right 

system is without any significant disease. His initial echo showed an EF of 45% 

looks slightly worse on the LV gram at 25%.





I think it is possible that most of his respiratory symptoms are secondary to 

coronary artery disease/ischemic cardiomyopathy.





He certainly has an indication to undergo CABG. He would need 3 grafts to the 

LAD, obtuse marginal and diagonal which are all good targets.





Beforehand, I would just like to risk stratify him, predominantly for his COPD. 





We should also confirm that his ischemic cardiomyopathy is reversible, which it 

appears to be.





Towards this and, we will obtain:


Viability study


Transthoracic echo to also assess his RV function which previously was mildly 

impaired.


PFTs


Noncontrast CT of the chest


Carotid duplex


Bilateral lower extremity vein mapping





We will medically optimize him prior to surgery-will discuss with Dr. Eastman 

if the patient would benefit from a little more diuresis.





Will plan for CABG either on Friday, April 21 or Monday April 24, given that 

his preoperative workup does not demonstrate extreme risk





Dr Eastman, thank you for allowing me to participate in the care of your 

patient. Will continue to follow closely








VIKTORIYA PERALTA MD Apr 19, 2017 14:53

## 2017-04-19 NOTE — PDOC
PULMONARY PROGRESS NOTES


Subjective


feels better


Vitals





 Vital Signs








  Date Time  Temp Pulse Resp B/P Pulse Ox O2 Delivery O2 Flow Rate FiO2


 


4/19/17 11:00 98.3 79 20 120/56 97 Nasal Cannula 4.0 





 98.3       








General:  Alert, No acute distress


Lungs:  Clear


Cardiovascular:  S1


Abdomen:  Soft, Other (obese)


Neuro Exam:  Alert


Extremities:  Other (trace edema)


Skin:  Warm


Labs





Laboratory Tests








Test


  4/17/17


13:50 4/17/17


17:45 4/17/17


17:50 4/17/17


21:27


 


Lactic Acid Level


  0.9mmol/L


(0.4-2.0) 


  


  


 


 


Glucose (Fingerstick)


  


  131mg/dL


(70-99) 


  195mg/dL


(70-99)


 


Troponin I Quantitative


  


  


  6.405ng/mL


(0.000-0.055) 


 














Test


  4/18/17


04:27 4/18/17


07:42 4/18/17


11:33 4/18/17


17:31


 


White Blood Count


  11.2x10^3/uL


(4.0-11.0) 


  


  


 


 


Red Blood Count


  5.46x10^6/uL


(4.30-5.70) 


  


  


 


 


Hemoglobin


  14.3g/dL


(13.0-17.5) 


  


  


 


 


Hematocrit


  43.7%


(39.0-53.0) 


  


  


 


 


Mean Corpuscular Volume 80fL ()    


 


Mean Corpuscular Hemoglobin 26pg (25-35)    


 


Mean Corpuscular Hemoglobin


Concent 33g/dL (31-37) 


  


  


  


 


 


Red Cell Distribution Width


  15.2%


(11.5-14.5) 


  


  


 


 


Platelet Count


  222x10^3/uL


(140-400) 


  


  


 


 


Neutrophils (%) (Auto) 79% (31-73)    


 


Lymphocytes (%) (Auto) 9% (24-48)    


 


Monocytes (%) (Auto) 10% (0-9)    


 


Eosinophils (%) (Auto) 2% (0-3)    


 


Basophils (%) (Auto) 0% (0-3)    


 


Neutrophils # (Auto)


  8.8x10^3uL


(1.8-7.7) 


  


  


 


 


Lymphocytes # (Auto)


  1.1x10^3/uL


(1.0-4.8) 


  


  


 


 


Monocytes # (Auto)


  1.1x10^3/uL


(0.0-1.1) 


  


  


 


 


Eosinophils # (Auto)


  0.2x10^3/uL


(0.0-0.7) 


  


  


 


 


Basophils # (Auto)


  0.0x10^3/uL


(0.0-0.2) 


  


  


 


 


Sodium Level


  141mmol/L


(136-145) 


  


  


 


 


Potassium Level


  3.8mmol/L


(3.5-5.1) 


  


  


 


 


Chloride Level


  97mmol/L


() 


  


  


 


 


Carbon Dioxide Level


  39mmol/L


(21-32) 


  


  


 


 


Anion Gap 5 (6-14)    


 


Blood Urea Nitrogen 25mg/dL (8-26)    


 


Creatinine


  1.4mg/dL


(0.7-1.3) 


  


  


 


 


Estimated GFR


(Cockcroft-Gault) 50.7 


  


  


  


 


 


Glucose Level


  153mg/dL


(70-99) 


  


  


 


 


Hemoglobin A1c 7.0% (4.8-5.6)    


 


Calcium Level


  9.2mg/dL


(8.5-10.1) 


  


  


 


 


Troponin I Quantitative


  5.920ng/mL


(0.000-0.055) 


  


  


 


 


Glucose (Fingerstick)


  


  134mg/dL


(70-99) 197mg/dL


(70-99) 83mg/dL


(70-99)














Test


  4/18/17


20:53 4/19/17


07:27 


  


 


 


Glucose (Fingerstick)


  122mg/dL


(70-99) 188mg/dL


(70-99) 


  


 








Laboratory Tests








Test


  4/18/17


17:31 4/18/17


20:53 4/19/17


07:27


 


Glucose (Fingerstick)


  83mg/dL


(70-99) 122mg/dL


(70-99) 188mg/dL


(70-99)








Medications





Active Scripts








 Medications  Dose


 Route/Sig Days Date Category


 


 Stiolto Respimat


 Inhal Spray


  (Tiotropium


 Br/Olodaterol


 HCl) 4 Gm


 Mist.inhal  4 Gm


 IH DAILY   3/14/17 Reported


 


 Novolog Flexpen


  (Insulin Aspart)


 100 Unit/1 Ml


 Insuln.pen  1 Unit


 SQ   3/14/17 Reported


 


 Ventolin Hfa


 Inhaler


  (Albuterol


 Sulfate) 18 Gm


 Hfa.aer.ad  2 Puff


 INH QID   3/14/17 Reported


 


 Flovent 110MCG


 Hfa (Fluticasone


 Propionate) 12 Gm


 Aer.w.adap  2 Puff


 IH BID   3/14/17 Reported


 


 Losartan


 Potassium 25 Mg


 Tablet  100 Mg


 PO DAILY   3/14/17 Reported


 


 Levemir (Insulin


 Detemir) 100


 Unit/1 Ml Vial  70 Unit


 SQ QHS   3/14/17 Reported











Impression


.


1.  Acute-on-chronic hypoxemic respiratory failure secondary to acute-on-chronic


systolic heart failure.


2.  Acute-on-chronic systolic heart failure.


3.   hypertension.


4.  Elevated troponin.


5.  Type 2 diabetes.


6.  Leukocytosis, suspect reactive.


7.  Elevated liver chemistries suspect hepatic congestion.





Plan


.





1.  Continue diuresis.


2.  Cath today


3.  Continue oxygen supplementation.


4.  No need for antibiotics or steroids.


5.  Consult dietary.


6.  Pt on home oxygen at 4 litres








ENOCH FAN MD Apr 19, 2017 12:06

## 2017-04-20 VITALS — SYSTOLIC BLOOD PRESSURE: 120 MMHG | DIASTOLIC BLOOD PRESSURE: 66 MMHG

## 2017-04-20 VITALS — SYSTOLIC BLOOD PRESSURE: 127 MMHG | DIASTOLIC BLOOD PRESSURE: 65 MMHG

## 2017-04-20 VITALS — SYSTOLIC BLOOD PRESSURE: 113 MMHG | DIASTOLIC BLOOD PRESSURE: 46 MMHG

## 2017-04-20 VITALS — SYSTOLIC BLOOD PRESSURE: 133 MMHG | DIASTOLIC BLOOD PRESSURE: 68 MMHG

## 2017-04-20 VITALS — SYSTOLIC BLOOD PRESSURE: 116 MMHG | DIASTOLIC BLOOD PRESSURE: 62 MMHG

## 2017-04-20 VITALS — SYSTOLIC BLOOD PRESSURE: 127 MMHG | DIASTOLIC BLOOD PRESSURE: 59 MMHG

## 2017-04-20 LAB
ALBUMIN SERPL-MCNC: 3.1 G/DL (ref 3.4–5)
ALBUMIN/GLOB SERPL: 0.9 {RATIO} (ref 1–1.7)
ALP SERPL-CCNC: 71 U/L (ref 46–116)
ALT SERPL-CCNC: 55 U/L (ref 16–63)
ANION GAP SERPL CALC-SCNC: 4 MMOL/L (ref 6–14)
AST SERPL-CCNC: 52 U/L (ref 15–37)
BASOPHILS # BLD AUTO: 0 X10^3/UL (ref 0–0.2)
BASOPHILS NFR BLD: 0 % (ref 0–3)
BILIRUB SERPL-MCNC: 1 MG/DL (ref 0.2–1)
BUN SERPL-MCNC: 24 MG/DL (ref 8–26)
BUN/CREAT SERPL: 18 (ref 6–20)
CALCIUM SERPL-MCNC: 8.3 MG/DL (ref 8.5–10.1)
CHLORIDE SERPL-SCNC: 99 MMOL/L (ref 98–107)
CO2 SERPL-SCNC: 37 MMOL/L (ref 21–32)
CREAT SERPL-MCNC: 1.3 MG/DL (ref 0.7–1.3)
EOSINOPHIL NFR BLD: 4 % (ref 0–3)
ERYTHROCYTE [DISTWIDTH] IN BLOOD BY AUTOMATED COUNT: 15.1 % (ref 11.5–14.5)
GFR SERPLBLD BASED ON 1.73 SQ M-ARVRAT: 55.2 ML/MIN
GLOBULIN SER-MCNC: 3.3 G/DL (ref 2.2–3.8)
GLUCOSE SERPL-MCNC: 104 MG/DL (ref 70–99)
HCT VFR BLD CALC: 40.3 % (ref 39–53)
HGB BLD-MCNC: 12.9 G/DL (ref 13–17.5)
LYMPHOCYTES # BLD: 1.1 X10^3/UL (ref 1–4.8)
LYMPHOCYTES NFR BLD AUTO: 14 % (ref 24–48)
MCH RBC QN AUTO: 26 PG (ref 25–35)
MCHC RBC AUTO-ENTMCNC: 32 G/DL (ref 31–37)
MCV RBC AUTO: 82 FL (ref 79–100)
MONOCYTES NFR BLD: 12 % (ref 0–9)
NEUTROPHILS NFR BLD AUTO: 70 % (ref 31–73)
PLATELET # BLD AUTO: 193 X10^3/UL (ref 140–400)
POTASSIUM SERPL-SCNC: 3.4 MMOL/L (ref 3.5–5.1)
PROT SERPL-MCNC: 6.4 G/DL (ref 6.4–8.2)
RBC # BLD AUTO: 4.92 X10^6/UL (ref 4.3–5.7)
SODIUM SERPL-SCNC: 140 MMOL/L (ref 136–145)
WBC # BLD AUTO: 8 X10^3/UL (ref 4–11)

## 2017-04-20 RX ADMIN — Medication SCH EA: at 21:00

## 2017-04-20 RX ADMIN — INSULIN ASPART SCH UNITS: 100 INJECTION, SOLUTION INTRAVENOUS; SUBCUTANEOUS at 12:00

## 2017-04-20 RX ADMIN — IPRATROPIUM BROMIDE AND ALBUTEROL SULFATE SCH ML: .5; 3 SOLUTION RESPIRATORY (INHALATION) at 12:12

## 2017-04-20 RX ADMIN — BACITRACIN ZINC, NEOMYCIN, POLYMYXIN B SCH PKT: 400; 3.5; 5 OINTMENT TOPICAL at 11:53

## 2017-04-20 RX ADMIN — BACITRACIN ZINC, NEOMYCIN, POLYMYXIN B SCH PKT: 400; 3.5; 5 OINTMENT TOPICAL at 20:35

## 2017-04-20 RX ADMIN — INSULIN ASPART SCH UNITS: 100 INJECTION, SOLUTION INTRAVENOUS; SUBCUTANEOUS at 11:55

## 2017-04-20 RX ADMIN — INSULIN ASPART SCH UNITS: 100 INJECTION, SOLUTION INTRAVENOUS; SUBCUTANEOUS at 18:03

## 2017-04-20 RX ADMIN — INSULIN ASPART SCH UNITS: 100 INJECTION, SOLUTION INTRAVENOUS; SUBCUTANEOUS at 07:30

## 2017-04-20 RX ADMIN — INSULIN DETEMIR SCH UNITS: 100 INJECTION, SOLUTION SUBCUTANEOUS at 20:38

## 2017-04-20 RX ADMIN — IPRATROPIUM BROMIDE AND ALBUTEROL SULFATE SCH ML: .5; 3 SOLUTION RESPIRATORY (INHALATION) at 20:55

## 2017-04-20 RX ADMIN — INSULIN ASPART SCH UNITS: 100 INJECTION, SOLUTION INTRAVENOUS; SUBCUTANEOUS at 08:00

## 2017-04-20 RX ADMIN — IPRATROPIUM BROMIDE AND ALBUTEROL SULFATE SCH ML: .5; 3 SOLUTION RESPIRATORY (INHALATION) at 08:24

## 2017-04-20 RX ADMIN — INSULIN ASPART SCH UNITS: 100 INJECTION, SOLUTION INTRAVENOUS; SUBCUTANEOUS at 17:00

## 2017-04-20 RX ADMIN — LOSARTAN POTASSIUM SCH MG: 50 TABLET ORAL at 11:52

## 2017-04-20 RX ADMIN — FUROSEMIDE SCH MG: 20 TABLET ORAL at 11:52

## 2017-04-20 RX ADMIN — ENOXAPARIN SODIUM SCH MG: 80 INJECTION SUBCUTANEOUS at 06:06

## 2017-04-20 RX ADMIN — FLUTICASONE PROPIONATE SCH SPRAY: 50 SPRAY, METERED NASAL at 09:00

## 2017-04-20 RX ADMIN — Medication SCH EA: at 11:51

## 2017-04-20 RX ADMIN — ENOXAPARIN SODIUM SCH MG: 80 INJECTION SUBCUTANEOUS at 17:59

## 2017-04-20 RX ADMIN — IPRATROPIUM BROMIDE AND ALBUTEROL SULFATE SCH ML: .5; 3 SOLUTION RESPIRATORY (INHALATION) at 16:15

## 2017-04-20 NOTE — EKG
[facility]

               [address] [place] [address]

Test Date:    2017               Test Time:    15:18:47

Pat Name:     ANISA TYLER              Department:   

Patient ID:   [id number]           Room:         252 1

Gender:       M                        Technician:   BROOKE

:          1950               Requested By: URSULA CHU

Order Number: [id number]            Reading MD:   Elva Montemayor

                                 Measurements

Intervals                              Axis          

Rate:         82                       P:            0

CO:           202                      QRS:          -59

QRSD:         116                      T:            110

QT:           410                                    

QTc:          482                                    

                           Interpretive Statements

SINUS ARRHYTHMIA

ABNORMAL LEFT AXIS DEVIATION

LEFT ANTERIOR FASCICULAR BLOCK

QRS(T) CONTOUR ABNORMALITY

CONSISTENT WITH ANTEROSEPTAL INFARCT

PROBABLY OLD

T ABNORMALITY IN LATERAL LEADS

ABNORMAL ECG



Electronically Signed On 2017 17:10:02 CDT by Elva Montemayor

## 2017-04-20 NOTE — RAD
--------------- APPROVED REPORT --------------





Patient Location: IN-PATIENT

Laterality:Bilateral



Indications

pre op cabg



Risk Factors

Diabetes

prev smoker x 42 yrs



Doppler Spectral Velocity Analysis

Right   Left    

pCCA     78/21 cm/spCCA     127/27 cm/s

mCCA     95/17 cm/smCCA     117/26 cm/s

dCCA     75/17 cm/sdCCA     71/19 cm/s

ECA      183/ cm/sECA      132/ cm/s

pICA     86/22 cm/spICA     84/19 cm/s

Fina     100/25 cm/smICA     76/25 cm/s

dICA     94/31 cm/sdICA     95/36 cm/s

Vert.    68/ cm/sVert.    45/ cm/s

ICA/CCA  1.05ICA/CCA  0.75



Findings

Gray scale images of the bilateral common carotid, external carotid and internal carotid arteries dem
onstrate diffuse circumferential intimal thickening of mild to moderate degree.



On the right the external carotid artery has elevated velocities suggestive of moderate stenosis. The
 internal carotid artery has normal velocity profiles and has 0-50% stenosis. The vertebral artery ve
locities are antegrade. Normal ratios are noted.



On the left there is mildly elevated systolic velocities in the common carotid artery without any sig
nificant obstructive disease noted in the internal carotid artery based on velocity profiles. Less th
an 50% stenosis. Antegrade vertebral velocities noted.



Critical Notification

Critical Value: No



<Conclusion>

No significant bilateral carotid artery stenosis.

Antegrade vertebral velocities although diminished.

## 2017-04-20 NOTE — PDOC
PULMONARY PROGRESS NOTES


Subjective


feels better


Vitals





 Vital Signs








  Date Time  Temp Pulse Resp B/P Pulse Ox O2 Delivery O2 Flow Rate FiO2


 


4/20/17 11:52  79  133/68    


 


4/20/17 11:16 97.8  24  98 Nasal Cannula  





 97.8       


 


4/20/17 08:24       3.0 








General:  Alert, No acute distress


Lungs:  Clear


Cardiovascular:  S1


Abdomen:  Soft, Other (obese)


Neuro Exam:  Alert


Extremities:  Other (trace edema)


Skin:  Warm


Labs





Laboratory Tests








Test


  4/18/17


17:31 4/18/17


20:53 4/19/17


07:27 4/19/17


12:37


 


Glucose (Fingerstick)


  83mg/dL


(70-99) 122mg/dL


(70-99) 188mg/dL


(70-99) 83mg/dL


(70-99)














Test


  4/19/17


17:13 4/19/17


20:22 4/19/17


22:55 4/20/17


04:05


 


Glucose (Fingerstick)


  157mg/dL


(70-99) 91mg/dL


(70-99) 


  


 


 


Prothrombin Time


  


  


  14.0SEC


(11.7-14.0) 


 


 


Prothromb Time International


Ratio 


  


  1.2 (0.8-1.1) 


  


 


 


Activated Partial


Thromboplast Time 


  


  37SEC (24-38) 


  


 


 


White Blood Count


  


  


  


  8.0x10^3/uL


(4.0-11.0)


 


Red Blood Count


  


  


  


  4.92x10^6/uL


(4.30-5.70)


 


Hemoglobin


  


  


  


  12.9g/dL


(13.0-17.5)


 


Hematocrit


  


  


  


  40.3%


(39.0-53.0)


 


Mean Corpuscular Volume    82fL () 


 


Mean Corpuscular Hemoglobin    26pg (25-35) 


 


Mean Corpuscular Hemoglobin


Concent 


  


  


  32g/dL (31-37) 


 


 


Red Cell Distribution Width


  


  


  


  15.1%


(11.5-14.5)


 


Platelet Count


  


  


  


  193x10^3/uL


(140-400)


 


Neutrophils (%) (Auto)    70% (31-73) 


 


Lymphocytes (%) (Auto)    14% (24-48) 


 


Monocytes (%) (Auto)    12% (0-9) 


 


Eosinophils (%) (Auto)    4% (0-3) 


 


Basophils (%) (Auto)    0% (0-3) 


 


Neutrophils # (Auto)


  


  


  


  5.6x10^3uL


(1.8-7.7)


 


Lymphocytes # (Auto)


  


  


  


  1.1x10^3/uL


(1.0-4.8)


 


Monocytes # (Auto)


  


  


  


  1.0x10^3/uL


(0.0-1.1)


 


Eosinophils # (Auto)


  


  


  


  0.3x10^3/uL


(0.0-0.7)


 


Basophils # (Auto)


  


  


  


  0.0x10^3/uL


(0.0-0.2)


 


Sodium Level


  


  


  


  140mmol/L


(136-145)


 


Potassium Level


  


  


  


  3.4mmol/L


(3.5-5.1)


 


Chloride Level


  


  


  


  99mmol/L


()


 


Carbon Dioxide Level


  


  


  


  37mmol/L


(21-32)


 


Anion Gap    4 (6-14) 


 


Blood Urea Nitrogen    24mg/dL (8-26) 


 


Creatinine


  


  


  


  1.3mg/dL


(0.7-1.3)


 


Estimated GFR


(Cockcroft-Gault) 


  


  


  55.2 


 


 


BUN/Creatinine Ratio    18 (6-20) 


 


Glucose Level


  


  


  


  104mg/dL


(70-99)


 


Calcium Level


  


  


  


  8.3mg/dL


(8.5-10.1)


 


Total Bilirubin


  


  


  


  1.0mg/dL


(0.2-1.0)


 


Aspartate Amino Transf


(AST/SGOT) 


  


  


  52U/L (15-37) 


 


 


Alanine Aminotransferase


(ALT/SGPT) 


  


  


  55U/L (16-63) 


 


 


Alkaline Phosphatase    71U/L () 


 


Total Protein


  


  


  


  6.4g/dL


(6.4-8.2)


 


Albumin


  


  


  


  3.1g/dL


(3.4-5.0)


 


Albumin/Globulin Ratio    0.9 (1.0-1.7) 














Test


  4/20/17


08:07 4/20/17


11:51 


  


 


 


Glucose (Fingerstick)


  121mg/dL


(70-99) 118mg/dL


(70-99) 


  


 








Laboratory Tests








Test


  4/19/17


12:37 4/19/17


17:13 4/19/17


20:22 4/19/17


22:55


 


Glucose (Fingerstick)


  83mg/dL


(70-99) 157mg/dL


(70-99) 91mg/dL


(70-99) 


 


 


Prothrombin Time


  


  


  


  14.0SEC


(11.7-14.0)


 


Prothromb Time International


Ratio 


  


  


  1.2 (0.8-1.1) 


 


 


Activated Partial


Thromboplast Time 


  


  


  37SEC (24-38) 


 














Test


  4/20/17


04:05 4/20/17


08:07 4/20/17


11:51 


 


 


White Blood Count


  8.0x10^3/uL


(4.0-11.0) 


  


  


 


 


Red Blood Count


  4.92x10^6/uL


(4.30-5.70) 


  


  


 


 


Hemoglobin


  12.9g/dL


(13.0-17.5) 


  


  


 


 


Hematocrit


  40.3%


(39.0-53.0) 


  


  


 


 


Mean Corpuscular Volume 82fL ()    


 


Mean Corpuscular Hemoglobin 26pg (25-35)    


 


Mean Corpuscular Hemoglobin


Concent 32g/dL (31-37) 


  


  


  


 


 


Red Cell Distribution Width


  15.1%


(11.5-14.5) 


  


  


 


 


Platelet Count


  193x10^3/uL


(140-400) 


  


  


 


 


Neutrophils (%) (Auto) 70% (31-73)    


 


Lymphocytes (%) (Auto) 14% (24-48)    


 


Monocytes (%) (Auto) 12% (0-9)    


 


Eosinophils (%) (Auto) 4% (0-3)    


 


Basophils (%) (Auto) 0% (0-3)    


 


Neutrophils # (Auto)


  5.6x10^3uL


(1.8-7.7) 


  


  


 


 


Lymphocytes # (Auto)


  1.1x10^3/uL


(1.0-4.8) 


  


  


 


 


Monocytes # (Auto)


  1.0x10^3/uL


(0.0-1.1) 


  


  


 


 


Eosinophils # (Auto)


  0.3x10^3/uL


(0.0-0.7) 


  


  


 


 


Basophils # (Auto)


  0.0x10^3/uL


(0.0-0.2) 


  


  


 


 


Sodium Level


  140mmol/L


(136-145) 


  


  


 


 


Potassium Level


  3.4mmol/L


(3.5-5.1) 


  


  


 


 


Chloride Level


  99mmol/L


() 


  


  


 


 


Carbon Dioxide Level


  37mmol/L


(21-32) 


  


  


 


 


Anion Gap 4 (6-14)    


 


Blood Urea Nitrogen 24mg/dL (8-26)    


 


Creatinine


  1.3mg/dL


(0.7-1.3) 


  


  


 


 


Estimated GFR


(Cockcroft-Gault) 55.2 


  


  


  


 


 


BUN/Creatinine Ratio 18 (6-20)    


 


Glucose Level


  104mg/dL


(70-99) 


  


  


 


 


Calcium Level


  8.3mg/dL


(8.5-10.1) 


  


  


 


 


Total Bilirubin


  1.0mg/dL


(0.2-1.0) 


  


  


 


 


Aspartate Amino Transf


(AST/SGOT) 52U/L (15-37) 


  


  


  


 


 


Alanine Aminotransferase


(ALT/SGPT) 55U/L (16-63) 


  


  


  


 


 


Alkaline Phosphatase 71U/L ()    


 


Total Protein


  6.4g/dL


(6.4-8.2) 


  


  


 


 


Albumin


  3.1g/dL


(3.4-5.0) 


  


  


 


 


Albumin/Globulin Ratio 0.9 (1.0-1.7)    


 


Glucose (Fingerstick)


  


  121mg/dL


(70-99) 118mg/dL


(70-99) 


 








Medications





Active Scripts








 Medications  Dose


 Route/Sig Days Date Category


 


 Stiolto Respimat


 Inhal Spray


  (Tiotropium


 Br/Olodaterol


 HCl) 4 Gm


 Mist.inhal  4 Gm


 IH DAILY   3/14/17 Reported


 


 Novolog Flexpen


  (Insulin Aspart)


 100 Unit/1 Ml


 Insuln.pen  1 Unit


 SQ   3/14/17 Reported


 


 Ventolin Hfa


 Inhaler


  (Albuterol


 Sulfate) 18 Gm


 Hfa.aer.ad  2 Puff


 INH QID   3/14/17 Reported


 


 Flovent 110MCG


 Hfa (Fluticasone


 Propionate) 12 Gm


 Aer.w.adap  2 Puff


 IH BID   3/14/17 Reported


 


 Losartan


 Potassium 25 Mg


 Tablet  100 Mg


 PO DAILY   3/14/17 Reported


 


 Levemir (Insulin


 Detemir) 100


 Unit/1 Ml Vial  70 Unit


 SQ QHS   3/14/17 Reported











Impression


.


1.  Acute-on-chronic hypoxemic respiratory failure secondary to acute-on-chronic


systolic heart failure.


2.  Acute-on-chronic systolic heart failure.


3.   hypertension.


4.  Elevated troponin.


5.  Type 2 diabetes.


6.  Leukocytosis, suspect reactive.


7.  Elevated liver chemistries suspect hepatic congestion. resolved


8.  COPD,moderate FEV1 1.3(41%) Predicted


9.  Multivessel CAD


10. 2.2 cm mass with eccentric calcification, ? malignancy vs hemartoma





Plan


.





1.  s/p cath . MV CAD, not the best optimum candidate for surgery . d/w Dr Oconnell


2.  CT Chest with 2.2 cm left lung mass with eccentric calcification, could be 

Hamartoma, cannot exclude Neoplasm. would benefit from OP PET, since CABG is 

cancelled , Cardiology to consider PCI. would recommend stabilizing cardiac 

status first before persuing with any invasive biopsy such as Bronch or ct 

guided lung biopsy. would recommend OP PET first. If Hypermetabolic mass,then 

have to wait 3-4 weeks before invasive biopsy and stabilization of cardiac 

status (may need at least 4-6 week an anti-platelet therapy )


3.  Continue oxygen supplementation.


4.  No need for antibiotics or steroids.


5.  d/w Dr Eastman and Dr Oconnell


6.  Pt on home oxygen at 4 litres.








ENOCH FAN MD Apr 20, 2017 12:17

## 2017-04-20 NOTE — PDOC
PROGRESS NOTES


Subjective


Subjective


The patient had an episode of chest pain earlier requiring some nitroglycerin.





No chest pains at this time.





A CT of the chest was done and it showed:


CT of the chest without contrast, 4/19/2017:





History: Preop evaluation for coronary artery disease with CABG





Noncontrast scans were obtained as requested. There is mild calcific plaquing


of the thoracic aorta without evidence of aneurysm. Moderate scattered


coronary artery calcifications are present. The heart is not enlarged. No


mediastinal adenopathy is seen.





There are mild emphysematous changes in the lungs. There are a few scattered


linear scars. There is a moderate linear opacity in the left lower lobe


compatible with a scar. There are several adjacent parenchymal calcifications


compatible with granulomas. Several other tiny calcified granulomata are


present in the right lung.





There is a 2.2 cm soft tissue nodule in the left lower lobe. It contains a


single slightly eccentric calcification. This does not necessarily mean that


this is a benign process as a neoplasm can engulf granulomas or independently


calcify. There is a thinner branching soft tissue density extending laterally


from this main nodule. This branching process is of lower density and could


represent debris in a obstructed, dilated bronchus. This process is best


delineated on images [phone] of series #3.





Objective


Objective





 Vital Signs








  Date Time  Temp Pulse Resp B/P Pulse Ox O2 Delivery O2 Flow Rate FiO2


 


4/20/17 16:15      Nasal Cannula 3.0 


 


4/20/17 15:43 98.0 86 22 120/66 96   





 98.0       














 Intake and Output 


 


 4/20/17





 07:00


 


Intake Total 1950 ml


 


Output Total 2350 ml


 


Balance -400 ml


 


 


 


Intake Oral 1950 ml


 


Output Urine Total 2350 ml











Physical Exam


Physical Exam


No significant changes in cardiac exam





Assessment


Assessment


This patient has significant COPD, severe coronary artery disease, and appears 

to have a mass in the long that seems to be a malignancy.





The case was discussed with the cardiovascular surgeon as well as the 

pulmonologist and the consensus was that the patient should not undergo 

coronary artery bypass surgery but rather have stenting of 





the LAD with a bare metal stent and that down the road to have further 

evaluation of the lung mass.





I agree with this approach and even though this is a risky procedure on this 

patient I have discussed the situation options and risks with the patient and 

he was decided to proceed tomorrow with a stenting 





of the LAD and perhaps the diagonal.





The patient agreed with this approach.








 Problems


Medical Problems:


(1) Acute on chronic diastolic CHF (congestive heart failure)


Status: Acute  





(2) Acute on chronic respiratory failure


Status: Acute  





(3) Cardiac ischemia


Status: Acute  





(4) COPD exacerbation


Status: Acute  








Comment


Review of Relevant


I have reviewed the following items elfego (where applicable) has been applied.


Labs





Laboratory Tests








Test


  4/18/17


20:53 4/19/17


07:27 4/19/17


12:37 4/19/17


17:13


 


Glucose (Fingerstick)


  122mg/dL


(70-99) 188mg/dL


(70-99) 83mg/dL


(70-99) 157mg/dL


(70-99)














Test


  4/19/17


20:22 4/19/17


22:55 4/20/17


04:05 4/20/17


08:07


 


Glucose (Fingerstick)


  91mg/dL


(70-99) 


  


  121mg/dL


(70-99)


 


Prothrombin Time


  


  14.0SEC


(11.7-14.0) 


  


 


 


Prothromb Time International


Ratio 


  1.2 (0.8-1.1) 


  


  


 


 


Activated Partial


Thromboplast Time 


  37SEC (24-38) 


  


  


 


 


White Blood Count


  


  


  8.0x10^3/uL


(4.0-11.0) 


 


 


Red Blood Count


  


  


  4.92x10^6/uL


(4.30-5.70) 


 


 


Hemoglobin


  


  


  12.9g/dL


(13.0-17.5) 


 


 


Hematocrit


  


  


  40.3%


(39.0-53.0) 


 


 


Mean Corpuscular Volume   82fL ()  


 


Mean Corpuscular Hemoglobin   26pg (25-35)  


 


Mean Corpuscular Hemoglobin


Concent 


  


  32g/dL (31-37) 


  


 


 


Red Cell Distribution Width


  


  


  15.1%


(11.5-14.5) 


 


 


Platelet Count


  


  


  193x10^3/uL


(140-400) 


 


 


Neutrophils (%) (Auto)   70% (31-73)  


 


Lymphocytes (%) (Auto)   14% (24-48)  


 


Monocytes (%) (Auto)   12% (0-9)  


 


Eosinophils (%) (Auto)   4% (0-3)  


 


Basophils (%) (Auto)   0% (0-3)  


 


Neutrophils # (Auto)


  


  


  5.6x10^3uL


(1.8-7.7) 


 


 


Lymphocytes # (Auto)


  


  


  1.1x10^3/uL


(1.0-4.8) 


 


 


Monocytes # (Auto)


  


  


  1.0x10^3/uL


(0.0-1.1) 


 


 


Eosinophils # (Auto)


  


  


  0.3x10^3/uL


(0.0-0.7) 


 


 


Basophils # (Auto)


  


  


  0.0x10^3/uL


(0.0-0.2) 


 


 


Sodium Level


  


  


  140mmol/L


(136-145) 


 


 


Potassium Level


  


  


  3.4mmol/L


(3.5-5.1) 


 


 


Chloride Level


  


  


  99mmol/L


() 


 


 


Carbon Dioxide Level


  


  


  37mmol/L


(21-32) 


 


 


Anion Gap   4 (6-14)  


 


Blood Urea Nitrogen   24mg/dL (8-26)  


 


Creatinine


  


  


  1.3mg/dL


(0.7-1.3) 


 


 


Estimated GFR


(Cockcroft-Gault) 


  


  55.2 


  


 


 


BUN/Creatinine Ratio   18 (6-20)  


 


Glucose Level


  


  


  104mg/dL


(70-99) 


 


 


Calcium Level


  


  


  8.3mg/dL


(8.5-10.1) 


 


 


Total Bilirubin


  


  


  1.0mg/dL


(0.2-1.0) 


 


 


Aspartate Amino Transf


(AST/SGOT) 


  


  52U/L (15-37) 


  


 


 


Alanine Aminotransferase


(ALT/SGPT) 


  


  55U/L (16-63) 


  


 


 


Alkaline Phosphatase   71U/L ()  


 


Total Protein


  


  


  6.4g/dL


(6.4-8.2) 


 


 


Albumin


  


  


  3.1g/dL


(3.4-5.0) 


 


 


Albumin/Globulin Ratio   0.9 (1.0-1.7)  














Test


  4/20/17


11:51 4/20/17


13:17 4/20/17


16:40 


 


 


Glucose (Fingerstick)


  118mg/dL


(70-99) 193mg/dL


(70-99) 224mg/dL


(70-99) 


 








Laboratory Tests








Test


  4/19/17


20:22 4/19/17


22:55 4/20/17


04:05 4/20/17


08:07


 


Glucose (Fingerstick)


  91mg/dL


(70-99) 


  


  121mg/dL


(70-99)


 


Prothrombin Time


  


  14.0SEC


(11.7-14.0) 


  


 


 


Prothromb Time International


Ratio 


  1.2 (0.8-1.1) 


  


  


 


 


Activated Partial


Thromboplast Time 


  37SEC (24-38) 


  


  


 


 


White Blood Count


  


  


  8.0x10^3/uL


(4.0-11.0) 


 


 


Red Blood Count


  


  


  4.92x10^6/uL


(4.30-5.70) 


 


 


Hemoglobin


  


  


  12.9g/dL


(13.0-17.5) 


 


 


Hematocrit


  


  


  40.3%


(39.0-53.0) 


 


 


Mean Corpuscular Volume   82fL ()  


 


Mean Corpuscular Hemoglobin   26pg (25-35)  


 


Mean Corpuscular Hemoglobin


Concent 


  


  32g/dL (31-37) 


  


 


 


Red Cell Distribution Width


  


  


  15.1%


(11.5-14.5) 


 


 


Platelet Count


  


  


  193x10^3/uL


(140-400) 


 


 


Neutrophils (%) (Auto)   70% (31-73)  


 


Lymphocytes (%) (Auto)   14% (24-48)  


 


Monocytes (%) (Auto)   12% (0-9)  


 


Eosinophils (%) (Auto)   4% (0-3)  


 


Basophils (%) (Auto)   0% (0-3)  


 


Neutrophils # (Auto)


  


  


  5.6x10^3uL


(1.8-7.7) 


 


 


Lymphocytes # (Auto)


  


  


  1.1x10^3/uL


(1.0-4.8) 


 


 


Monocytes # (Auto)


  


  


  1.0x10^3/uL


(0.0-1.1) 


 


 


Eosinophils # (Auto)


  


  


  0.3x10^3/uL


(0.0-0.7) 


 


 


Basophils # (Auto)


  


  


  0.0x10^3/uL


(0.0-0.2) 


 


 


Sodium Level


  


  


  140mmol/L


(136-145) 


 


 


Potassium Level


  


  


  3.4mmol/L


(3.5-5.1) 


 


 


Chloride Level


  


  


  99mmol/L


() 


 


 


Carbon Dioxide Level


  


  


  37mmol/L


(21-32) 


 


 


Anion Gap   4 (6-14)  


 


Blood Urea Nitrogen   24mg/dL (8-26)  


 


Creatinine


  


  


  1.3mg/dL


(0.7-1.3) 


 


 


Estimated GFR


(Cockcroft-Gault) 


  


  55.2 


  


 


 


BUN/Creatinine Ratio   18 (6-20)  


 


Glucose Level


  


  


  104mg/dL


(70-99) 


 


 


Calcium Level


  


  


  8.3mg/dL


(8.5-10.1) 


 


 


Total Bilirubin


  


  


  1.0mg/dL


(0.2-1.0) 


 


 


Aspartate Amino Transf


(AST/SGOT) 


  


  52U/L (15-37) 


  


 


 


Alanine Aminotransferase


(ALT/SGPT) 


  


  55U/L (16-63) 


  


 


 


Alkaline Phosphatase   71U/L ()  


 


Total Protein


  


  


  6.4g/dL


(6.4-8.2) 


 


 


Albumin


  


  


  3.1g/dL


(3.4-5.0) 


 


 


Albumin/Globulin Ratio   0.9 (1.0-1.7)  














Test


  4/20/17


11:51 4/20/17


13:17 4/20/17


16:40 


 


 


Glucose (Fingerstick)


  118mg/dL


(70-99) 193mg/dL


(70-99) 224mg/dL


(70-99) 


 








Microbiology


4/17/17 Blood Culture - Preliminary, Resulted


          NO GROWTH AFTER 3 DAYS


Medications





 Current Medications


Nitroglycerin/ Dextrose (Nitroglycerin Drip) 250 ml @ 0 mls/hr CONT  PRN IV SEE 

I/O RECORD Last administered on 4/17/17at 05:15;  Start 4/17/17 at 05:15


Albuterol/ Ipratropium (Duoneb) 6 ml 1X  ONCE NEB  Last administered on 4/17/ 17at 05:17;  Start 4/17/17 at 05:30;  Stop 4/17/17 at 05:31;  Status DC


Furosemide (Lasix) 60 mg 1X  ONCE IVP  Last administered on 4/17/17at 06:21;  

Start 4/17/17 at 06:30;  Stop 4/17/17 at 06:31;  Status DC


Ondansetron HCl 4 mg 4 mg PRN Q8HRS  PRN IV NAUSEA/VOMITING;  Start 4/17/17 at 

06:15;  Stop 4/18/17 at 06:14;  Status DC


Sodium Chloride (Iv Sodium Chloride 0.9% 1000ml Bag) 1,000 ml @  30 mls/hr Q24H 

IV  Last administered on 4/17/17at 06:21;  Start 4/17/17 at 06:30;  Stop 4/18/ 17 at 06:29;  Status DC


Acetaminophen (Tylenol) 650 mg PRN Q4HRS  PRN PO FEVER Last administered on 4/17 /17at 20:35;  Start 4/17/17 at 06:15;  Stop 4/18/17 at 06:14;  Status DC


Albuterol/ Ipratropium (Duoneb) 3 ml RTQID NEB  Last administered on 4/18/17at 

07:39;  Start 4/17/17 at 08:00;  Stop 4/18/17 at 07:59;  Status DC


Fluticasone Propionate (Flonase) 2 spray DAILY NS  Last administered on 4/18/ 17at 09:53;  Start 4/18/17 at 09:00


Non-Formulary Medication 4 gm DAILY IH ;  Start 4/18/17 at 09:00;  Status UNV


Insulin Aspart (Novolog) 25 units TIDAC SQ ;  Start 4/17/17 at 11:30;  Stop 4/17 /17 at 11:55;  Status DC


Losartan Potassium (Cozaar) 25 mg DAILY PO ;  Start 4/18/17 at 09:00;  Stop 4/18 /17 at 09:00;  Status DC


Albuterol Sulfate (Ventolin Neb Soln) 2.5 mg PRN Q4HRS  PRN NEB SHORTNESS OF 

BREATH;  Start 4/17/17 at 09:15


Insulin Detemir (Levemir) 70 units QHS SQ  Last administered on 4/19/17at 21:26

;  Start 4/17/17 at 21:00


Losartan Potassium (Cozaar) 25 mg DAILY PO  Last administered on 4/17/17at 10:04

;  Start 4/17/17 at 09:15;  Stop 4/18/17 at 07:42;  Status DC


Furosemide (Lasix) 20 mg DAILY PO  Last administered on 4/20/17at 11:52;  Start 

4/17/17 at 09:15


Insulin Aspart (Novolog) 0-7 UNITS TIDWMEALS SQ  Last administered on 4/18/17at 

13:14;  Start 4/17/17 at 12:00


Dextrose (Dextrose 50%-Water Syringe) 12.5 gm PRN Q15MIN  PRN IV SEE COMMENTS;  

Start 4/17/17 at 09:15


Insulin Aspart (Novolog) 15 units TIDAC SQ  Last administered on 4/18/17at 13:13

;  Start 4/17/17 at 12:00;  Stop 4/18/17 at 14:29;  Status DC


Furosemide (Lasix) 80 mg Q12HR PO  Last administered on 4/18/17at 21:13;  Start 

4/17/17 at 21:00;  Stop 4/19/17 at 00:00;  Status DC


Furosemide (Lasix) 40 mg 1X  ONCE PO  Last administered on 4/17/17at 13:13;  

Start 4/17/17 at 12:45;  Stop 4/17/17 at 12:50;  Status DC


Furosemide (Lasix) 20 mg 1X  ONCE PO ;  Start 4/17/17 at 12:45;  Stop 4/17/17 

at 12:46;  Status Cancel


Enoxaparin Sodium (Lovenox 80mg Syringe) 80 mg Q12H SQ  Last administered on 4/ 20/17at 17:59;  Start 4/17/17 at 17:00


Cyclobenzaprine HCl (Flexeril) 5 mg PRN Q6HRS  PRN PO MUSCLE SPASMS;  Start 4/18 /17 at 07:45


Losartan Potassium (Cozaar) 50 mg DAILY PO  Last administered on 4/20/17at 11:52

;  Start 4/18/17 at 09:00


Albuterol/ Ipratropium (Duoneb) 3 ml RTQID NEB  Last administered on 4/20/17at 

16:15;  Start 4/18/17 at 12:00


Insulin Aspart (Novolog) 30 units TIDAC SQ  Last administered on 4/20/17at 18:03

;  Start 4/18/17 at 16:30


Acetylcysteine (Mucomyst 20% Oral Solution) 600 mg Q8H PO  Last administered on 

4/19/17at 06:50;  Start 4/18/17 at 22:00;  Stop 4/19/17 at 06:01;  Status DC


Non-Formulary Medication 1 ea BID INH  Last administered on 4/20/17at 11:51;  

Start 4/18/17 at 21:00


Acetaminophen 650 mg 650 mg PRN Q6HRS  PRN PO HEADACHE Last administered on 4/18 /17at 19:50;  Start 4/18/17 at 19:45


Heparin Sodium/ Sodium Chloride 1,000 ml @  As Directed STK-MED ONCE .ROUTE ;  

Start 4/19/17 at 08:52;  Stop 4/19/17 at 08:53;  Status DC


Lidocaine HCl 20 ml STK-MED ONCE .ROUTE ;  Start 4/19/17 at 08:52;  Stop 4/19/ 17 at 08:53;  Status DC


Iodixanol (Visipaque 320) 100 ml STK-MED ONCE .ROUTE ;  Start 4/19/17 at 08:52;

  Stop 4/19/17 at 08:53;  Status DC


Fentanyl Citrate (Fentanyl 5ml Vial) 250 mcg STK-MED ONCE .ROUTE ;  Start 4/19/ 17 at 11:18;  Stop 4/19/17 at 11:19;  Status DC


Midazolam HCl (Versed) 5 mg STK-MED ONCE .ROUTE ;  Start 4/19/17 at 11:18;  

Stop 4/19/17 at 11:19;  Status DC


Heparin Sodium/ Sodium Chloride 1,000 unit 1X  ONCE IART  Last administered on 4 /19/17at 12:16;  Start 4/19/17 at 11:30;  Stop 4/19/17 at 11:31;  Status DC


Midazolam HCl (Versed) 5 mg 1X  ONCE IV  Last administered on 4/19/17at 12:17;  

Start 4/19/17 at 11:30;  Stop 4/19/17 at 11:31;  Status DC


Fentanyl Citrate (Fentanyl 5ml Vial) 250 mcg 1X  ONCE IV  Last administered on 4 /19/17at 12:17;  Start 4/19/17 at 11:30;  Stop 4/19/17 at 11:31;  Status DC


Iodixanol (Visipaque 320) 100 ml 1X  ONCE IART  Last administered on 4/19/17at 

12:16;  Start 4/19/17 at 11:30;  Stop 4/19/17 at 11:31;  Status DC


Lidocaine HCl 20 ml 1X  ONCE IJ  Last administered on 4/19/17at 12:16;  Start 4/ 19/17 at 11:30;  Stop 4/19/17 at 11:31;  Status DC


Potassium Chloride (Klor-Con) 40 meq 1X  ONCE PO  Last administered on 4/20/ 17at 11:52;  Start 4/20/17 at 08:30;  Stop 4/20/17 at 08:31;  Status DC


Neomycin/ Polymyxin/ Bacitracin (Triple Antibiotic Ointment) 1 pkt BID TP  Last 

administered on 4/20/17at 11:53;  Start 4/20/17 at 09:00


Nitroglycerin (Nitrostat) 0.4 mg STK-MED ONCE SL ;  Start 4/20/17 at 13:09;  

Stop 4/20/17 at 13:10;  Status DC


Nitroglycerin (Nitrostat) 0.4 mg PRN Q5MIN  PRN SL CHEST PAIN Last administered 

on 4/20/17at 14:57;  Start 4/20/17 at 13:15


Diltiazem HCl 10 mg 10 mg 1X  ONCE IVP ;  Start 4/20/17 at 13:45;  Stop 4/20/17 

at 13:46;  Status DC


Diltiazem HCl/ Dextrose (Cardizem) 125 ml @ 0 mls/hr CONT  PRN IV SEE I/O RECORD

;  Start 4/20/17 at 13:30





Active Scripts


Active


Reported


Stiolto Respimat Inhal Spray (Tiotropium Br/Olodaterol HCl) 4 Gm Mist.inhal 4 

Gm IH DAILY


Novolog Flexpen (Insulin Aspart) 100 Unit/1 Ml Insuln.pen 1 Unit SQ 


Ventolin Hfa Inhaler (Albuterol Sulfate) 18 Gm Hfa.aer.ad 2 Puff INH QID


Flovent 110MCG Hfa (Fluticasone Propionate) 12 Gm Aer.w.adap 2 Puff IH BID


Losartan Potassium 25 Mg Tablet 100 Mg PO DAILY


Levemir (Insulin Detemir) 100 Unit/1 Ml Vial 70 Unit SQ QHS


Vitals/I & O





 Vital Sign - Last 24 Hours








 4/19/17 4/19/17 4/19/17 4/20/17





 20:00 20:09 22:07 03:49


 


Temp   98.2 98.6





   98.2 98.6


 


Pulse   88 71


 


Resp   18 16


 


B/P   117/58 127/65


 


Pulse Ox  96 96 99


 


O2 Delivery Nasal Cannula Nasal Cannula Nasal Cannula Nasal Cannula


 


O2 Flow Rate 3.0 3.0 3.0 3.0


 


    





    





 4/20/17 4/20/17 4/20/17 4/20/17





 07:00 08:10 08:24 11:16


 


Temp 98.0   97.8





 98.0   97.8


 


Pulse 71   79


 


Resp 24   24


 


B/P 113/46   133/68


 


Pulse Ox 100  98 98


 


O2 Delivery Nasal Cannula Nasal Cannula Nasal Cannula Nasal Cannula


 


O2 Flow Rate  3.0 3.0 


 


    





    





 4/20/17 4/20/17 4/20/17 4/20/17





 11:52 12:13 14:57 15:43


 


Temp    98.0





    98.0


 


Pulse 79  90 86


 


Resp    22


 


B/P 133/68  197/93 120/66


 


Pulse Ox    96


 


O2 Delivery  Nasal Cannula  Nasal Cannula


 


O2 Flow Rate  3.0  


 


    





    





 4/20/17   





 16:15   


 


O2 Delivery Nasal Cannula   


 


O2 Flow Rate 3.0   














 Intake and Output   


 


 4/19/17 4/19/17 4/20/17





 15:00 23:00 07:00


 


Intake Total 120 ml 1530 ml 300 ml


 


Output Total 1000 ml 925 ml 425 ml


 


Balance -880 ml 605 ml -125 ml














URSULA CHU MD Apr 20, 2017 19:36

## 2017-04-20 NOTE — PDOC
SUBJECTIVE


Subjective


Pt with heart catheterization yesterday showing significant CAD.  Pt being 

scheduled for CABG possibly this Friday or Monday.  Feeling well this morning





OBJECTIVE


Vital Signs





Vital Signs








  Date Time  Temp Pulse Resp B/P Pulse Ox O2 Delivery O2 Flow Rate FiO2


 


4/20/17 03:49 98.6 71 16 127/65 99 Nasal Cannula 3.0 





 98.6       


 


4/19/17 22:07 98.2 88 18 117/58 96 Nasal Cannula 3.0 





 98.2       


 


4/19/17 20:09     96 Nasal Cannula 3.0 


 


4/19/17 20:00      Nasal Cannula 3.0 


 


4/19/17 19:24 98.3 95 18 140/70 93 Nasal Cannula 2.0 





 98.3       


 


4/19/17 16:17      Nasal Cannula 3.0 


 


4/19/17 15:00 98.3 78 20 114/56 97 Nasal Cannula 4.0 





 98.3       


 


4/19/17 14:01  88  107/47    


 


4/19/17 13:31  78  105/61    


 


4/19/17 13:05  90  104/61    


 


4/19/17 12:50  88  107/69    


 


4/19/17 12:47   18   Nasal Cannula  


 


4/19/17 12:35  82  134/72    


 


4/19/17 12:17   16  97 Nasal Cannula 2.0 


 


4/19/17 12:14  88 16  97 Nasal Cannula 3.0 


 


4/19/17 11:00 98.3 79 20 120/56 97 Nasal Cannula 4.0 





 98.3       


 


4/19/17 08:33  83  116/75    


 


4/19/17 08:23     97 Nasal Cannula 3.0 








I & O











 Intake and Output 


 


 4/20/17





 07:00


 


Intake Total 1950 ml


 


Output Total 2350 ml


 


Balance -400 ml


 


 


 


Intake Oral 1950 ml


 


Output Urine Total 2350 ml











PHYSICAL EXAM


Physical Exam


General:  Alert, Oriented X3, Cooperative, NAD


HEENT:  Atraumatic, PERRLA, EOMI, Mucous membr. moist/pink


Lungs:  Clear to auscultation, Normal air movement


Heart:  RRR, no rubs, no gallops, no murmurs


Abdomen:  Normal bowel sounds, Other (distended)


Extremities:  No clubbing, No cyanosis, no erythema/edema LE bilaterally 


Skin:  No rashes, No breakdown, No significant lesion


Neuro:  Normal tone, Sensation intact, Cranial nerves 3-12 NL


Psych/Mental Status:  Mental status NL, Mood NL





ASSESSMENT/PLAN


Assessment/Plan


Pt is a 67yo CM admitted for acute on chronic respiratory failure





1)Acute on Chronic Respiratory failure- 2/2 CHF exacerbation, resolved.  Pt had 

been receiving additional doses of Lasix, now back on normal dose of Lasix 

20mg.  Good net negative I/O's.  Cardiology and Pulmonology following





2)CAD- pt's heart catheterization yesterday showed significant CAD.  Pt being 

scheduled for CABG this Friday or Monday.





3)Accelerated HTN- pt was initially on Nitro gtt, improved on increased dose of 

Losartan 50mg.  





4)DM2- well controlled with HbA1C this admission of 7.  Pt continued on home 

insulin of Levemir 70 units QHS, and Novolog decreased to 30 units QAC.  Has 

SSI available.  





5)Leukocytosis- resolved.  Pt afebrile and without symptoms of infection.  CTM





6)Elevated AST- likely 2/2 hepatic congestion, CTM





7)Acute on CKD- 2/2 diuretic use, resolved


Problems:  





COMMENT


Lab





Laboratory Tests








Test


  4/19/17


12:37 4/19/17


17:13 4/19/17


20:22 4/19/17


22:55


 


Glucose (Fingerstick)


  83mg/dL


(70-99) 157mg/dL


(70-99) 91mg/dL


(70-99) 


 


 


Prothrombin Time


  


  


  


  14.0SEC


(11.7-14.0)


 


Prothromb Time International


Ratio 


  


  


  1.2 (0.8-1.1) 


 


 


Activated Partial


Thromboplast Time 


  


  


  37SEC (24-38) 


 














Test


  4/20/17


04:05 


  


  


 


 


White Blood Count


  8.0x10^3/uL


(4.0-11.0) 


  


  


 


 


Red Blood Count


  4.92x10^6/uL


(4.30-5.70) 


  


  


 


 


Hemoglobin


  12.9g/dL


(13.0-17.5) 


  


  


 


 


Hematocrit


  40.3%


(39.0-53.0) 


  


  


 


 


Mean Corpuscular Volume 82fL ()    


 


Mean Corpuscular Hemoglobin 26pg (25-35)    


 


Mean Corpuscular Hemoglobin


Concent 32g/dL (31-37) 


  


  


  


 


 


Red Cell Distribution Width


  15.1%


(11.5-14.5) 


  


  


 


 


Platelet Count


  193x10^3/uL


(140-400) 


  


  


 


 


Neutrophils (%) (Auto) 70% (31-73)    


 


Lymphocytes (%) (Auto) 14% (24-48)    


 


Monocytes (%) (Auto) 12% (0-9)    


 


Eosinophils (%) (Auto) 4% (0-3)    


 


Basophils (%) (Auto) 0% (0-3)    


 


Neutrophils # (Auto)


  5.6x10^3uL


(1.8-7.7) 


  


  


 


 


Lymphocytes # (Auto)


  1.1x10^3/uL


(1.0-4.8) 


  


  


 


 


Monocytes # (Auto)


  1.0x10^3/uL


(0.0-1.1) 


  


  


 


 


Eosinophils # (Auto)


  0.3x10^3/uL


(0.0-0.7) 


  


  


 


 


Basophils # (Auto)


  0.0x10^3/uL


(0.0-0.2) 


  


  


 


 


Sodium Level


  140mmol/L


(136-145) 


  


  


 


 


Potassium Level


  3.4mmol/L


(3.5-5.1) 


  


  


 


 


Chloride Level


  99mmol/L


() 


  


  


 


 


Carbon Dioxide Level


  37mmol/L


(21-32) 


  


  


 


 


Anion Gap 4 (6-14)    


 


Blood Urea Nitrogen 24mg/dL (8-26)    


 


Creatinine


  1.3mg/dL


(0.7-1.3) 


  


  


 


 


Estimated GFR


(Cockcroft-Gault) 55.2 


  


  


  


 


 


BUN/Creatinine Ratio 18 (6-20)    


 


Glucose Level


  104mg/dL


(70-99) 


  


  


 


 


Calcium Level


  8.3mg/dL


(8.5-10.1) 


  


  


 


 


Total Bilirubin


  1.0mg/dL


(0.2-1.0) 


  


  


 


 


Aspartate Amino Transf


(AST/SGOT) 52U/L (15-37) 


  


  


  


 


 


Alanine Aminotransferase


(ALT/SGPT) 55U/L (16-63) 


  


  


  


 


 


Alkaline Phosphatase 71U/L ()    


 


Total Protein


  6.4g/dL


(6.4-8.2) 


  


  


 


 


Albumin


  3.1g/dL


(3.4-5.0) 


  


  


 


 


Albumin/Globulin Ratio 0.9 (1.0-1.7)    














TAWNY NORIEGA MD Apr 20, 2017 08:08

## 2017-04-20 NOTE — PDOC
Provider Note


Provider Note


I reviewed Mr. Azul's preoperative tests.





Unfortunately he has a 2.2 cm mass in the superior segment of the left lower 

lobe. Based on Hounsfield units, the tumor appears to be solid. There is no 

fatty tissue within the tumor. He also has a branching pattern. These findings 

are very concerning for a primary lung cancer.





In addition he has severe COPD, he is currently on 4 L of oxygen at home, FEV1 

is 40%, and also has some moderate pulmonary hypertension with PA pressures in 

the 50s.





The incidental finding of this lung mass is also now a priority. Not knowing 

what the prognosis of this lung tumor is, I think it would be best to proceed 

with PCI, rather than CABG. 





These findings were discussed with the patient.





I will also discuss with Dr. Eastman.








VIKTORIYA PERALTA MD Apr 20, 2017 14:39

## 2017-04-21 VITALS — DIASTOLIC BLOOD PRESSURE: 44 MMHG | SYSTOLIC BLOOD PRESSURE: 109 MMHG

## 2017-04-21 VITALS — SYSTOLIC BLOOD PRESSURE: 127 MMHG | DIASTOLIC BLOOD PRESSURE: 78 MMHG

## 2017-04-21 VITALS — DIASTOLIC BLOOD PRESSURE: 48 MMHG | SYSTOLIC BLOOD PRESSURE: 110 MMHG

## 2017-04-21 VITALS — SYSTOLIC BLOOD PRESSURE: 113 MMHG | DIASTOLIC BLOOD PRESSURE: 56 MMHG

## 2017-04-21 VITALS — SYSTOLIC BLOOD PRESSURE: 106 MMHG | DIASTOLIC BLOOD PRESSURE: 57 MMHG

## 2017-04-21 VITALS — SYSTOLIC BLOOD PRESSURE: 139 MMHG | DIASTOLIC BLOOD PRESSURE: 52 MMHG

## 2017-04-21 VITALS — SYSTOLIC BLOOD PRESSURE: 106 MMHG | DIASTOLIC BLOOD PRESSURE: 76 MMHG

## 2017-04-21 VITALS — DIASTOLIC BLOOD PRESSURE: 72 MMHG | SYSTOLIC BLOOD PRESSURE: 143 MMHG

## 2017-04-21 VITALS — SYSTOLIC BLOOD PRESSURE: 142 MMHG | DIASTOLIC BLOOD PRESSURE: 66 MMHG

## 2017-04-21 VITALS — SYSTOLIC BLOOD PRESSURE: 114 MMHG | DIASTOLIC BLOOD PRESSURE: 74 MMHG

## 2017-04-21 VITALS — DIASTOLIC BLOOD PRESSURE: 53 MMHG | SYSTOLIC BLOOD PRESSURE: 140 MMHG

## 2017-04-21 VITALS — DIASTOLIC BLOOD PRESSURE: 86 MMHG | SYSTOLIC BLOOD PRESSURE: 174 MMHG

## 2017-04-21 VITALS — SYSTOLIC BLOOD PRESSURE: 135 MMHG | DIASTOLIC BLOOD PRESSURE: 61 MMHG

## 2017-04-21 VITALS — SYSTOLIC BLOOD PRESSURE: 155 MMHG | DIASTOLIC BLOOD PRESSURE: 58 MMHG

## 2017-04-21 VITALS — SYSTOLIC BLOOD PRESSURE: 130 MMHG | DIASTOLIC BLOOD PRESSURE: 48 MMHG

## 2017-04-21 VITALS — DIASTOLIC BLOOD PRESSURE: 57 MMHG | SYSTOLIC BLOOD PRESSURE: 149 MMHG

## 2017-04-21 VITALS — DIASTOLIC BLOOD PRESSURE: 46 MMHG | SYSTOLIC BLOOD PRESSURE: 131 MMHG

## 2017-04-21 PROCEDURE — 4A023N7 MEASUREMENT OF CARDIAC SAMPLING AND PRESSURE, LEFT HEART, PERCUTANEOUS APPROACH: ICD-10-PCS | Performed by: INTERNAL MEDICINE

## 2017-04-21 PROCEDURE — B2151ZZ FLUOROSCOPY OF LEFT HEART USING LOW OSMOLAR CONTRAST: ICD-10-PCS | Performed by: INTERNAL MEDICINE

## 2017-04-21 PROCEDURE — 02703EZ DILATION OF CORONARY ARTERY, ONE ARTERY WITH TWO INTRALUMINAL DEVICES, PERCUTANEOUS APPROACH: ICD-10-PCS | Performed by: INTERNAL MEDICINE

## 2017-04-21 PROCEDURE — B2111ZZ FLUOROSCOPY OF MULTIPLE CORONARY ARTERIES USING LOW OSMOLAR CONTRAST: ICD-10-PCS | Performed by: INTERNAL MEDICINE

## 2017-04-21 RX ADMIN — IPRATROPIUM BROMIDE AND ALBUTEROL SULFATE SCH ML: .5; 3 SOLUTION RESPIRATORY (INHALATION) at 20:06

## 2017-04-21 RX ADMIN — FUROSEMIDE SCH MG: 20 TABLET ORAL at 08:32

## 2017-04-21 RX ADMIN — INSULIN ASPART SCH UNITS: 100 INJECTION, SOLUTION INTRAVENOUS; SUBCUTANEOUS at 08:38

## 2017-04-21 RX ADMIN — INSULIN ASPART SCH UNITS: 100 INJECTION, SOLUTION INTRAVENOUS; SUBCUTANEOUS at 12:00

## 2017-04-21 RX ADMIN — IPRATROPIUM BROMIDE AND ALBUTEROL SULFATE SCH ML: .5; 3 SOLUTION RESPIRATORY (INHALATION) at 16:35

## 2017-04-21 RX ADMIN — FLUTICASONE PROPIONATE SCH SPRAY: 50 SPRAY, METERED NASAL at 09:00

## 2017-04-21 RX ADMIN — INSULIN ASPART SCH UNITS: 100 INJECTION, SOLUTION INTRAVENOUS; SUBCUTANEOUS at 08:00

## 2017-04-21 RX ADMIN — INSULIN ASPART SCH UNITS: 100 INJECTION, SOLUTION INTRAVENOUS; SUBCUTANEOUS at 14:15

## 2017-04-21 RX ADMIN — IPRATROPIUM BROMIDE AND ALBUTEROL SULFATE SCH ML: .5; 3 SOLUTION RESPIRATORY (INHALATION) at 06:16

## 2017-04-21 RX ADMIN — ENOXAPARIN SODIUM SCH MG: 80 INJECTION SUBCUTANEOUS at 17:00

## 2017-04-21 RX ADMIN — BACITRACIN ZINC, NEOMYCIN, POLYMYXIN B SCH PKT: 400; 3.5; 5 OINTMENT TOPICAL at 20:55

## 2017-04-21 RX ADMIN — LOSARTAN POTASSIUM SCH MG: 50 TABLET ORAL at 08:32

## 2017-04-21 RX ADMIN — INSULIN ASPART SCH UNITS: 100 INJECTION, SOLUTION INTRAVENOUS; SUBCUTANEOUS at 17:56

## 2017-04-21 RX ADMIN — ENOXAPARIN SODIUM SCH MG: 80 INJECTION SUBCUTANEOUS at 05:00

## 2017-04-21 RX ADMIN — BACITRACIN ZINC, NEOMYCIN, POLYMYXIN B SCH PKT: 400; 3.5; 5 OINTMENT TOPICAL at 08:32

## 2017-04-21 RX ADMIN — INSULIN DETEMIR SCH UNITS: 100 INJECTION, SOLUTION SUBCUTANEOUS at 20:54

## 2017-04-21 RX ADMIN — INSULIN ASPART SCH UNITS: 100 INJECTION, SOLUTION INTRAVENOUS; SUBCUTANEOUS at 17:00

## 2017-04-21 RX ADMIN — Medication SCH EA: at 20:54

## 2017-04-21 RX ADMIN — Medication SCH EA: at 08:33

## 2017-04-21 RX ADMIN — IPRATROPIUM BROMIDE AND ALBUTEROL SULFATE SCH ML: .5; 3 SOLUTION RESPIRATORY (INHALATION) at 14:00

## 2017-04-21 NOTE — CARD
--------------- APPROVED REPORT --------------





Procedure(s) performed: PTCA with Stenting

IABP Placement



PROCEDURE NARRATIVE

After obtaining informed consent the patient was brought to the Cath Lab.



Both groins were prepped and draped in the usual fashion.



The left groin area was infiltrated with Xylocaine to obtain topical anesthesia and then using Seldin
last technique a intra-aortic balloon pump sheath was inserted. After this was done and over the guide
wire an intra-aortic balloon pump catheter was advanced and once I was satisfied with its positioning
 it was secured in place.



We connected the catheter to the intra-aortic balloon pump consult and we obtain a good augmentation 
of the pressure.



I then went to the right groin and after the area was infiltrated with Xylocaine to obtain topical an
esthesia a sheath was inserted into the femoral artery.



The patient was then fully heparinized.



A guiding catheter was then utilized to engage the left coronary os and views of the left coronary ar
alva were then done.



Findings:



Coronaries the left main has about a 40% ostial plaquing. The LAD is 99% in the proximal segment the 
first diagonal which had been diffusely disease in the previous heart catheterization was now occlude
d. The circumflex and the marginals are unchanged.



After this was evaluated I decided to proceed with stenting of the LAD.



A guidewire was then advanced into the LAD and all the way to the distal portion of the LAD we attemp
camilo to bring in 3.0 mm bare metal stent with its balloon but it would not cross therefore we removed 
it and a 2.5 mm balloon was advanced over the guidewire and the area of stenosis was predilated.



Following that that balloon was removed and then a 3.0 mm x 18 mm bare-metal stent was advanced over 
the guidewire and once I was satisfied with its position the stent was deployed high-pressure inflati
ons were then done.



The balloon was then removed and then a view was then done and we then saw that the previously dilate
d areas appear to be well opened but there was still some disease just distal to the stent therefore 
a second stent this time a 2.75 mm x 15 mm bare metal stent was advanced over the guidewire and once 
I was satisfied with its position 

it was deployed in place. Multiple high-pressure inflations were then done in the whole area.



Views were then done and we saw that the previously dilated areas appear to be well opened there was 
excellent flow through there the stents appear to be in good position and no dissection was identifie
d.



The patient was now free of chest pains.



He had been fully heparinized and also had received a bolus of Plavix.



The sheaths and the intra-aortic balloon pump were then sutured in place dressings were applied to angela
th groins and the patient was transferred to the intensive care unit for further care in satisfactory
 condition after tolerating the procedure rather well.











Conclusion

The patient tolerated the stenting of the LAD well.



After a couple of hours I will be pulling the intra-aortic balloon pump if the patient is stable.



This were bare metal stents deployed in the LAD.



We will leave it up to the pulmonologist to decide as to when they want to do further pulmonary giselle
p as far as the mass that is in the lungs.

## 2017-04-21 NOTE — PDOC
PULMONARY PROGRESS NOTES


Subjective


feels better


Vitals





 Vital Signs








  Date Time  Temp Pulse Resp B/P Pulse Ox O2 Delivery O2 Flow Rate FiO2


 


4/21/17 08:32  57  106/57    


 


4/21/17 08:00      Nasal Cannula 3.0 


 


4/21/17 07:30 97.9  18  98   





 97.9       








General:  Alert, No acute distress


Lungs:  Clear


Cardiovascular:  S1


Abdomen:  Soft, Other (obese)


Neuro Exam:  Alert


Extremities:  Other (trace edema)


Skin:  Warm


Labs





Laboratory Tests








Test


  4/19/17


12:37 4/19/17


17:13 4/19/17


20:22 4/19/17


22:55


 


Glucose (Fingerstick)


  83mg/dL


(70-99) 157mg/dL


(70-99) 91mg/dL


(70-99) 


 


 


Prothrombin Time


  


  


  


  14.0SEC


(11.7-14.0)


 


Prothromb Time International


Ratio 


  


  


  1.2 (0.8-1.1) 


 


 


Activated Partial


Thromboplast Time 


  


  


  37SEC (24-38) 


 














Test


  4/20/17


04:05 4/20/17


08:07 4/20/17


11:51 4/20/17


13:17


 


White Blood Count


  8.0x10^3/uL


(4.0-11.0) 


  


  


 


 


Red Blood Count


  4.92x10^6/uL


(4.30-5.70) 


  


  


 


 


Hemoglobin


  12.9g/dL


(13.0-17.5) 


  


  


 


 


Hematocrit


  40.3%


(39.0-53.0) 


  


  


 


 


Mean Corpuscular Volume 82fL ()    


 


Mean Corpuscular Hemoglobin 26pg (25-35)    


 


Mean Corpuscular Hemoglobin


Concent 32g/dL (31-37) 


  


  


  


 


 


Red Cell Distribution Width


  15.1%


(11.5-14.5) 


  


  


 


 


Platelet Count


  193x10^3/uL


(140-400) 


  


  


 


 


Neutrophils (%) (Auto) 70% (31-73)    


 


Lymphocytes (%) (Auto) 14% (24-48)    


 


Monocytes (%) (Auto) 12% (0-9)    


 


Eosinophils (%) (Auto) 4% (0-3)    


 


Basophils (%) (Auto) 0% (0-3)    


 


Neutrophils # (Auto)


  5.6x10^3uL


(1.8-7.7) 


  


  


 


 


Lymphocytes # (Auto)


  1.1x10^3/uL


(1.0-4.8) 


  


  


 


 


Monocytes # (Auto)


  1.0x10^3/uL


(0.0-1.1) 


  


  


 


 


Eosinophils # (Auto)


  0.3x10^3/uL


(0.0-0.7) 


  


  


 


 


Basophils # (Auto)


  0.0x10^3/uL


(0.0-0.2) 


  


  


 


 


Sodium Level


  140mmol/L


(136-145) 


  


  


 


 


Potassium Level


  3.4mmol/L


(3.5-5.1) 


  


  


 


 


Chloride Level


  99mmol/L


() 


  


  


 


 


Carbon Dioxide Level


  37mmol/L


(21-32) 


  


  


 


 


Anion Gap 4 (6-14)    


 


Blood Urea Nitrogen 24mg/dL (8-26)    


 


Creatinine


  1.3mg/dL


(0.7-1.3) 


  


  


 


 


Estimated GFR


(Cockcroft-Gault) 55.2 


  


  


  


 


 


BUN/Creatinine Ratio 18 (6-20)    


 


Glucose Level


  104mg/dL


(70-99) 


  


  


 


 


Calcium Level


  8.3mg/dL


(8.5-10.1) 


  


  


 


 


Total Bilirubin


  1.0mg/dL


(0.2-1.0) 


  


  


 


 


Aspartate Amino Transf


(AST/SGOT) 52U/L (15-37) 


  


  


  


 


 


Alanine Aminotransferase


(ALT/SGPT) 55U/L (16-63) 


  


  


  


 


 


Alkaline Phosphatase 71U/L ()    


 


Total Protein


  6.4g/dL


(6.4-8.2) 


  


  


 


 


Albumin


  3.1g/dL


(3.4-5.0) 


  


  


 


 


Albumin/Globulin Ratio 0.9 (1.0-1.7)    


 


Glucose (Fingerstick)


  


  121mg/dL


(70-99) 118mg/dL


(70-99) 193mg/dL


(70-99)














Test


  4/20/17


16:40 4/20/17


20:34 


  


 


 


Glucose (Fingerstick)


  224mg/dL


(70-99) 143mg/dL


(70-99) 


  


 








Laboratory Tests








Test


  4/20/17


11:51 4/20/17


13:17 4/20/17


16:40 4/20/17


20:34


 


Glucose (Fingerstick)


  118mg/dL


(70-99) 193mg/dL


(70-99) 224mg/dL


(70-99) 143mg/dL


(70-99)








Medications





Active Scripts








 Medications  Dose


 Route/Sig Days Date Category


 


 Stiolto Respimat


 Inhal Spray


  (Tiotropium


 Br/Olodaterol


 HCl) 4 Gm


 Mist.inhal  4 Gm


 IH DAILY   3/14/17 Reported


 


 Novolog Flexpen


  (Insulin Aspart)


 100 Unit/1 Ml


 Insuln.pen  1 Unit


 SQ   3/14/17 Reported


 


 Ventolin Hfa


 Inhaler


  (Albuterol


 Sulfate) 18 Gm


 Hfa.aer.ad  2 Puff


 INH QID   3/14/17 Reported


 


 Flovent 110MCG


 Hfa (Fluticasone


 Propionate) 12 Gm


 Aer.w.adap  2 Puff


 IH BID   3/14/17 Reported


 


 Losartan


 Potassium 25 Mg


 Tablet  100 Mg


 PO DAILY   3/14/17 Reported


 


 Levemir (Insulin


 Detemir) 100


 Unit/1 Ml Vial  70 Unit


 SQ QHS   3/14/17 Reported











Impression


.


1.  Acute-on-chronic hypoxemic respiratory failure secondary to acute-on-chronic


systolic heart failure.


2.  Acute-on-chronic systolic heart failure.


3.   hypertension.


4.  Elevated troponin.


5.  Type 2 diabetes.


6.  Leukocytosis, suspect reactive.


7.  Elevated liver chemistries suspect hepatic congestion. resolved


8.  COPD,moderate FEV1 1.3(41%) Predicted


9.  Multivessel CAD


10. 2.2 cm mass with eccentric calcification, ? malignancy vs hemartoma





Plan


.





1.  s/p cath . MV CAD, not the best optimum candidate for surgery . d/w Dr Oconnell


2.  CT Chest with 2.2 cm left lung mass with eccentric calcification, could be 

Hamartoma, cannot exclude Neoplasm. would benefit from OP PET, since CABG is 

cancelled , Cardiology to consider PCI. would recommend stabilizing cardiac 

status first before persuing with any invasive biopsy such as Bronch or ct 

guided lung biopsy. would recommend OP PET first. If Hypermetabolic mass,then 

have to wait 3-4 weeks before invasive biopsy and stabilization of cardiac 

status (may need at least 4-6 week an anti-platelet therapy )


3.  Continue oxygen supplementation.


4.  No need for antibiotics or steroids.


5.  d/w Dr Eastman and Dr Oconnell


6.  Pt on home oxygen at 4 litres.








ENOCH FAN MD Apr 21, 2017 10:02

## 2017-04-21 NOTE — PDOC
SUBJECTIVE


Subjective


CABG cancelled due to lung mass found.  Planning for stent placement in LAD 

this afternoon.  Pt hasn't had a BM for several days.





OBJECTIVE


Vital Signs





Vital Signs








  Date Time  Temp Pulse Resp B/P Pulse Ox O2 Delivery O2 Flow Rate FiO2


 


4/21/17 06:13      Nasal Cannula 3.0 


 


4/21/17 03:42 98.1 69 18 113/56 99 Nasal Cannula 3.0 





 98.1       


 


4/20/17 23:19 98.5 71 18 127/59 97 Nasal Cannula 3.0 





 98.5       


 


4/20/17 20:55      Nasal Cannula 3.0 


 


4/20/17 20:38      Nasal Cannula 3.0 


 


4/20/17 19:35 97.9 93 18 116/62 95 Nasal Cannula 3.0 





 97.9       


 


4/20/17 16:15      Nasal Cannula 3.0 


 


4/20/17 15:43 98.0 86 22 120/66 96 Nasal Cannula  





 98.0       


 


4/20/17 14:57  90  197/93    


 


4/20/17 12:13      Nasal Cannula 3.0 


 


4/20/17 11:52  79  133/68    


 


4/20/17 11:16 97.8 79 24 133/68 98 Nasal Cannula  





 97.8       


 


4/20/17 08:24     98 Nasal Cannula 3.0 


 


4/20/17 08:10      Nasal Cannula 3.0 








I & O











 Intake and Output 


 


 4/21/17





 07:00


 


Intake Total 1600 ml


 


Output Total 2250 ml


 


Balance -650 ml


 


 


 


Intake Oral 1600 ml


 


Output Urine Total 2250 ml











PHYSICAL EXAM


Physical Exam


General:  Alert, Oriented X3, Cooperative, NAD


HEENT:  Atraumatic, PERRLA, EOMI, Mucous membr. moist/pink


Lungs:  Clear to auscultation, Normal air movement


Heart:  RRR, no rubs, no gallops, no murmurs


Abdomen:  Normal bowel sounds, Other (distended)


Extremities:  No clubbing, No cyanosis, no erythema/edema LE bilaterally 


Skin:  No rashes, No breakdown, No significant lesion


Neuro:  Normal tone, Sensation intact, Cranial nerves 3-12 NL


Psych/Mental Status:  Mental status NL, Mood NL





ASSESSMENT/PLAN


Assessment/Plan


Pt is a 67yo CM admitted for acute on chronic respiratory failure





1)Acute on Chronic Respiratory failure- 2/2 CHF exacerbation, resolved.  Pt had 

been receiving additional doses of Lasix, now back on normal dose of Lasix 

20mg.  Good net negative I/O's.  Cardiology and Pulmonology following





2)CAD- pt's heart catheterization showed significant CAD.  Pt was intially 

going to be scheduled for CABG, but this was cancelled due to lung mass that 

was found.  Pt to get stent placed this afternoon





3)Accelerated HTN- pt was initially on Nitro gtt, improved on increased dose of 

Losartan 50mg.  





4)DM2- well controlled with HbA1C this admission of 7.  Pt continued on home 

insulin of Levemir 70 units QHS, and Novolog decreased to 30 units QAC.  Has 

SSI available.  





5)Leukocytosis- resolved.  Pt afebrile and without symptoms of infection.  CTM





6)Elevated AST- likely 2/2 hepatic congestion, CTM





7)Acute on CKD- 2/2 diuretic use, resolved





8)Lung mass- pulmonology following.  Pt to get outpatient PET scan


Problems:  





COMMENT


Lab





Laboratory Tests








Test


  4/20/17


08:07 4/20/17


11:51 4/20/17


13:17 4/20/17


16:40


 


Glucose (Fingerstick)


  121mg/dL


(70-99) 118mg/dL


(70-99) 193mg/dL


(70-99) 224mg/dL


(70-99)














Test


  4/20/17


20:34 


  


  


 


 


Glucose (Fingerstick)


  143mg/dL


(70-99) 


  


  


 














TAWNY NORIEGA MD Apr 21, 2017 07:53

## 2017-04-21 NOTE — PDOC4
PROCEDURE





Chin in the ICU post PTCA stent to the LAD with intra-aortic balloon pump 

support.





The IABP was DC'd and pressure was held for 25 minutes after which appeared to 

be no active bleeding.





Dressing was applied to the groin.





Patient tolerated this very well and is hemodynamically stable and free of 

angina at this time.








URSULA CHU MD Apr 21, 2017 15:55

## 2017-04-22 VITALS — DIASTOLIC BLOOD PRESSURE: 57 MMHG | SYSTOLIC BLOOD PRESSURE: 114 MMHG

## 2017-04-22 VITALS — SYSTOLIC BLOOD PRESSURE: 148 MMHG | DIASTOLIC BLOOD PRESSURE: 76 MMHG

## 2017-04-22 VITALS — SYSTOLIC BLOOD PRESSURE: 144 MMHG | DIASTOLIC BLOOD PRESSURE: 57 MMHG

## 2017-04-22 VITALS — SYSTOLIC BLOOD PRESSURE: 114 MMHG | DIASTOLIC BLOOD PRESSURE: 76 MMHG

## 2017-04-22 VITALS — DIASTOLIC BLOOD PRESSURE: 62 MMHG | SYSTOLIC BLOOD PRESSURE: 133 MMHG

## 2017-04-22 VITALS — SYSTOLIC BLOOD PRESSURE: 140 MMHG | DIASTOLIC BLOOD PRESSURE: 51 MMHG

## 2017-04-22 VITALS — SYSTOLIC BLOOD PRESSURE: 117 MMHG | DIASTOLIC BLOOD PRESSURE: 63 MMHG

## 2017-04-22 VITALS — SYSTOLIC BLOOD PRESSURE: 149 MMHG | DIASTOLIC BLOOD PRESSURE: 73 MMHG

## 2017-04-22 VITALS — SYSTOLIC BLOOD PRESSURE: 120 MMHG | DIASTOLIC BLOOD PRESSURE: 62 MMHG

## 2017-04-22 VITALS — DIASTOLIC BLOOD PRESSURE: 72 MMHG | SYSTOLIC BLOOD PRESSURE: 150 MMHG

## 2017-04-22 VITALS — DIASTOLIC BLOOD PRESSURE: 63 MMHG | SYSTOLIC BLOOD PRESSURE: 151 MMHG

## 2017-04-22 VITALS — SYSTOLIC BLOOD PRESSURE: 146 MMHG | DIASTOLIC BLOOD PRESSURE: 73 MMHG

## 2017-04-22 LAB
ANION GAP SERPL CALC-SCNC: 4 MMOL/L (ref 6–14)
BUN SERPL-MCNC: 21 MG/DL (ref 8–26)
CALCIUM SERPL-MCNC: 8.8 MG/DL (ref 8.5–10.1)
CHLORIDE SERPL-SCNC: 100 MMOL/L (ref 98–107)
CO2 SERPL-SCNC: 35 MMOL/L (ref 21–32)
CREAT SERPL-MCNC: 1.2 MG/DL (ref 0.7–1.3)
ERYTHROCYTE [DISTWIDTH] IN BLOOD BY AUTOMATED COUNT: 15.5 % (ref 11.5–14.5)
GFR SERPLBLD BASED ON 1.73 SQ M-ARVRAT: 60.6 ML/MIN
GLUCOSE SERPL-MCNC: 157 MG/DL (ref 70–99)
HCT VFR BLD CALC: 38.6 % (ref 39–53)
HGB BLD-MCNC: 13.1 G/DL (ref 13–17.5)
MCH RBC QN AUTO: 27 PG (ref 25–35)
MCHC RBC AUTO-ENTMCNC: 34 G/DL (ref 31–37)
MCV RBC AUTO: 80 FL (ref 79–100)
PLATELET # BLD AUTO: 198 X10^3/UL (ref 140–400)
POTASSIUM SERPL-SCNC: 4.5 MMOL/L (ref 3.5–5.1)
RBC # BLD AUTO: 4.82 X10^6/UL (ref 4.3–5.7)
SODIUM SERPL-SCNC: 139 MMOL/L (ref 136–145)
WBC # BLD AUTO: 7.5 X10^3/UL (ref 4–11)

## 2017-04-22 RX ADMIN — ENOXAPARIN SODIUM SCH MG: 80 INJECTION SUBCUTANEOUS at 04:36

## 2017-04-22 RX ADMIN — Medication SCH EA: at 08:15

## 2017-04-22 RX ADMIN — INSULIN ASPART SCH UNITS: 100 INJECTION, SOLUTION INTRAVENOUS; SUBCUTANEOUS at 18:03

## 2017-04-22 RX ADMIN — CLOPIDOGREL BISULFATE SCH MG: 75 TABLET ORAL at 08:14

## 2017-04-22 RX ADMIN — FLUTICASONE PROPIONATE SCH SPRAY: 50 SPRAY, METERED NASAL at 08:15

## 2017-04-22 RX ADMIN — FUROSEMIDE SCH MG: 20 TABLET ORAL at 08:14

## 2017-04-22 RX ADMIN — IPRATROPIUM BROMIDE AND ALBUTEROL SULFATE SCH ML: .5; 3 SOLUTION RESPIRATORY (INHALATION) at 07:30

## 2017-04-22 RX ADMIN — IPRATROPIUM BROMIDE AND ALBUTEROL SULFATE SCH ML: .5; 3 SOLUTION RESPIRATORY (INHALATION) at 16:06

## 2017-04-22 RX ADMIN — INSULIN ASPART SCH UNITS: 100 INJECTION, SOLUTION INTRAVENOUS; SUBCUTANEOUS at 08:00

## 2017-04-22 RX ADMIN — BACITRACIN ZINC, NEOMYCIN, POLYMYXIN B SCH PKT: 400; 3.5; 5 OINTMENT TOPICAL at 08:15

## 2017-04-22 RX ADMIN — INSULIN ASPART SCH UNITS: 100 INJECTION, SOLUTION INTRAVENOUS; SUBCUTANEOUS at 08:27

## 2017-04-22 RX ADMIN — IPRATROPIUM BROMIDE AND ALBUTEROL SULFATE SCH ML: .5; 3 SOLUTION RESPIRATORY (INHALATION) at 11:30

## 2017-04-22 RX ADMIN — Medication SCH EA: at 20:42

## 2017-04-22 RX ADMIN — ENOXAPARIN SODIUM SCH MG: 80 INJECTION SUBCUTANEOUS at 17:12

## 2017-04-22 RX ADMIN — Medication PRN EACH: at 13:23

## 2017-04-22 RX ADMIN — INSULIN ASPART SCH UNITS: 100 INJECTION, SOLUTION INTRAVENOUS; SUBCUTANEOUS at 17:00

## 2017-04-22 RX ADMIN — INSULIN ASPART SCH UNITS: 100 INJECTION, SOLUTION INTRAVENOUS; SUBCUTANEOUS at 12:00

## 2017-04-22 RX ADMIN — BACITRACIN ZINC, NEOMYCIN, POLYMYXIN B SCH PKT: 400; 3.5; 5 OINTMENT TOPICAL at 20:42

## 2017-04-22 RX ADMIN — IPRATROPIUM BROMIDE AND ALBUTEROL SULFATE SCH ML: .5; 3 SOLUTION RESPIRATORY (INHALATION) at 20:59

## 2017-04-22 RX ADMIN — INSULIN DETEMIR SCH UNITS: 100 INJECTION, SOLUTION SUBCUTANEOUS at 20:49

## 2017-04-22 RX ADMIN — ASPIRIN 325 MG ORAL TABLET SCH MG: 325 PILL ORAL at 08:13

## 2017-04-22 RX ADMIN — INSULIN ASPART SCH UNITS: 100 INJECTION, SOLUTION INTRAVENOUS; SUBCUTANEOUS at 11:30

## 2017-04-22 RX ADMIN — LOSARTAN POTASSIUM SCH MG: 50 TABLET ORAL at 08:14

## 2017-04-22 NOTE — PDOC
PROGRESS NOTES


Subjective


Subjective


No chest pains.





Feels better today.





Objective


Objective





 Vital Signs








  Date Time  Temp Pulse Resp B/P Pulse Ox O2 Delivery O2 Flow Rate FiO2


 


4/22/17 12:00 98.4 68 20 120/62 98 Nasal Cannula 3.0 





 98.4       














 Intake and Output 


 


 4/22/17





 07:00


 


Intake Total 2869 ml


 


Output Total 1975 ml


 


Balance 894 ml


 


 


 


Intake Oral 2090 ml


 


IV Total 779 ml


 


Output Urine Total 1975 ml











Physical Exam


Physical Exam


No significant changes in cardiac exam.





No bleeding from groins.





Assessment


Assessment


Patient stable.





May go home in a.m.





Minutes to continue with current medications including aspirin and Plavix.





I agree with getting a PET scan as an outpatient.





 Problems


Medical Problems:


(1) Acute on chronic diastolic CHF (congestive heart failure)


Status: Acute  





(2) Acute on chronic respiratory failure


Status: Acute  





(3) Cardiac ischemia


Status: Acute  





(4) COPD exacerbation


Status: Acute  








Comment


Review of Relevant


I have reviewed the following items elfego (where applicable) has been applied.


Labs





Laboratory Tests








Test


  4/20/17


16:40 4/20/17


20:34 4/21/17


07:58 4/21/17


13:34


 


Glucose (Fingerstick)


  224mg/dL


(70-99) 143mg/dL


(70-99) 174mg/dL


(70-99) 120mg/dL


(70-99)














Test


  4/21/17


14:30 4/21/17


17:53 4/21/17


20:17 4/22/17


08:12


 


Activated Clotting Time SEC ()    


 


Glucose (Fingerstick)


  


  168mg/dL


(70-99) 164mg/dL


(70-99) 138mg/dL


(70-99)














Test


  4/22/17


11:15 4/22/17


12:50 


  


 


 


White Blood Count


  7.5x10^3/uL


(4.0-11.0) 


  


  


 


 


Red Blood Count


  4.82x10^6/uL


(4.30-5.70) 


  


  


 


 


Hemoglobin


  13.1g/dL


(13.0-17.5) 


  


  


 


 


Hematocrit


  38.6%


(39.0-53.0) 


  


  


 


 


Mean Corpuscular Volume 80fL ()    


 


Mean Corpuscular Hemoglobin 27pg (25-35)    


 


Mean Corpuscular Hemoglobin


Concent 34g/dL (31-37) 


  


  


  


 


 


Red Cell Distribution Width


  15.5%


(11.5-14.5) 


  


  


 


 


Platelet Count


  198x10^3/uL


(140-400) 


  


  


 


 


Sodium Level


  139mmol/L


(136-145) 


  


  


 


 


Potassium Level


  4.5mmol/L


(3.5-5.1) 


  


  


 


 


Chloride Level


  100mmol/L


() 


  


  


 


 


Carbon Dioxide Level


  35mmol/L


(21-32) 


  


  


 


 


Anion Gap 4 (6-14)    


 


Blood Urea Nitrogen 21mg/dL (8-26)    


 


Creatinine


  1.2mg/dL


(0.7-1.3) 


  


  


 


 


Estimated GFR


(Cockcroft-Gault) 60.6 


  


  


  


 


 


Glucose Level


  157mg/dL


(70-99) 


  


  


 


 


Calcium Level


  8.8mg/dL


(8.5-10.1) 


  


  


 


 


Glucose (Fingerstick)


  


  123mg/dL


(70-99) 


  


 








Laboratory Tests








Test


  4/21/17


17:53 4/21/17


20:17 4/22/17


08:12 4/22/17


11:15


 


Glucose (Fingerstick)


  168mg/dL


(70-99) 164mg/dL


(70-99) 138mg/dL


(70-99) 


 


 


White Blood Count


  


  


  


  7.5x10^3/uL


(4.0-11.0)


 


Red Blood Count


  


  


  


  4.82x10^6/uL


(4.30-5.70)


 


Hemoglobin


  


  


  


  13.1g/dL


(13.0-17.5)


 


Hematocrit


  


  


  


  38.6%


(39.0-53.0)


 


Mean Corpuscular Volume    80fL () 


 


Mean Corpuscular Hemoglobin    27pg (25-35) 


 


Mean Corpuscular Hemoglobin


Concent 


  


  


  34g/dL (31-37) 


 


 


Red Cell Distribution Width


  


  


  


  15.5%


(11.5-14.5)


 


Platelet Count


  


  


  


  198x10^3/uL


(140-400)


 


Sodium Level


  


  


  


  139mmol/L


(136-145)


 


Potassium Level


  


  


  


  4.5mmol/L


(3.5-5.1)


 


Chloride Level


  


  


  


  100mmol/L


()


 


Carbon Dioxide Level


  


  


  


  35mmol/L


(21-32)


 


Anion Gap    4 (6-14) 


 


Blood Urea Nitrogen    21mg/dL (8-26) 


 


Creatinine


  


  


  


  1.2mg/dL


(0.7-1.3)


 


Estimated GFR


(Cockcroft-Gault) 


  


  


  60.6 


 


 


Glucose Level


  


  


  


  157mg/dL


(70-99)


 


Calcium Level


  


  


  


  8.8mg/dL


(8.5-10.1)














Test


  4/22/17


12:50 


  


  


 


 


Glucose (Fingerstick)


  123mg/dL


(70-99) 


  


  


 








Microbiology


4/17/17 Blood Culture - Final, Complete


          NO GROWTH AFTER 5 DAYS


Medications





 Current Medications


Nitroglycerin/ Dextrose (Nitroglycerin Drip) 250 ml @ 0 mls/hr CONT  PRN IV SEE 

I/O RECORD Last administered on 4/17/17at 05:15;  Start 4/17/17 at 05:15


Albuterol/ Ipratropium (Duoneb) 6 ml 1X  ONCE NEB  Last administered on 4/17/ 17at 05:17;  Start 4/17/17 at 05:30;  Stop 4/17/17 at 05:31;  Status DC


Furosemide (Lasix) 60 mg 1X  ONCE IVP  Last administered on 4/17/17at 06:21;  

Start 4/17/17 at 06:30;  Stop 4/17/17 at 06:31;  Status DC


Ondansetron HCl 4 mg 4 mg PRN Q8HRS  PRN IV NAUSEA/VOMITING;  Start 4/17/17 at 

06:15;  Stop 4/18/17 at 06:14;  Status DC


Sodium Chloride (Iv Sodium Chloride 0.9% 1000ml Bag) 1,000 ml @  30 mls/hr Q24H 

IV  Last administered on 4/17/17at 06:21;  Start 4/17/17 at 06:30;  Stop 4/18/ 17 at 06:29;  Status DC


Acetaminophen (Tylenol) 650 mg PRN Q4HRS  PRN PO FEVER Last administered on 4/17 /17at 20:35;  Start 4/17/17 at 06:15;  Stop 4/18/17 at 06:14;  Status DC


Albuterol/ Ipratropium (Duoneb) 3 ml RTQID NEB  Last administered on 4/18/17at 

07:39;  Start 4/17/17 at 08:00;  Stop 4/18/17 at 07:59;  Status DC


Fluticasone Propionate (Flonase) 2 spray DAILY NS  Last administered on 4/18/ 17at 09:53;  Start 4/18/17 at 09:00


Non-Formulary Medication 4 gm DAILY IH ;  Start 4/18/17 at 09:00;  Status UNV


Insulin Aspart (Novolog) 25 units TIDAC SQ ;  Start 4/17/17 at 11:30;  Stop 4/17 /17 at 11:55;  Status DC


Losartan Potassium (Cozaar) 25 mg DAILY PO ;  Start 4/18/17 at 09:00;  Stop 4/18 /17 at 09:00;  Status DC


Albuterol Sulfate (Ventolin Neb Soln) 2.5 mg PRN Q4HRS  PRN NEB SHORTNESS OF 

BREATH;  Start 4/17/17 at 09:15


Insulin Detemir (Levemir) 70 units QHS SQ  Last administered on 4/21/17at 20:54

;  Start 4/17/17 at 21:00


Losartan Potassium (Cozaar) 25 mg DAILY PO  Last administered on 4/17/17at 10:04

;  Start 4/17/17 at 09:15;  Stop 4/18/17 at 07:42;  Status DC


Furosemide (Lasix) 20 mg DAILY PO  Last administered on 4/22/17at 08:14;  Start 

4/17/17 at 09:15


Insulin Aspart (Novolog) 0-7 UNITS TIDWMEALS SQ  Last administered on 4/18/17at 

13:14;  Start 4/17/17 at 12:00


Dextrose (Dextrose 50%-Water Syringe) 12.5 gm PRN Q15MIN  PRN IV SEE COMMENTS;  

Start 4/17/17 at 09:15


Insulin Aspart (Novolog) 15 units TIDAC SQ  Last administered on 4/18/17at 13:13

;  Start 4/17/17 at 12:00;  Stop 4/18/17 at 14:29;  Status DC


Furosemide (Lasix) 80 mg Q12HR PO  Last administered on 4/18/17at 21:13;  Start 

4/17/17 at 21:00;  Stop 4/19/17 at 00:00;  Status DC


Furosemide (Lasix) 40 mg 1X  ONCE PO  Last administered on 4/17/17at 13:13;  

Start 4/17/17 at 12:45;  Stop 4/17/17 at 12:50;  Status DC


Furosemide (Lasix) 20 mg 1X  ONCE PO ;  Start 4/17/17 at 12:45;  Stop 4/17/17 

at 12:46;  Status Cancel


Enoxaparin Sodium (Lovenox 80mg Syringe) 80 mg Q12H SQ  Last administered on 4/ 22/17at 04:36;  Start 4/17/17 at 17:00


Cyclobenzaprine HCl (Flexeril) 5 mg PRN Q6HRS  PRN PO MUSCLE SPASMS;  Start 4/18 /17 at 07:45


Losartan Potassium (Cozaar) 50 mg DAILY PO  Last administered on 4/22/17at 08:14

;  Start 4/18/17 at 09:00


Albuterol/ Ipratropium (Duoneb) 3 ml RTQID NEB  Last administered on 4/21/17at 

20:06;  Start 4/18/17 at 12:00


Insulin Aspart (Novolog) 30 units TIDAC SQ  Last administered on 4/22/17at 11:30

;  Start 4/18/17 at 16:30


Acetylcysteine (Mucomyst 20% Oral Solution) 600 mg Q8H PO  Last administered on 

4/19/17at 06:50;  Start 4/18/17 at 22:00;  Stop 4/19/17 at 06:01;  Status DC


Non-Formulary Medication 1 ea BID INH  Last administered on 4/22/17at 08:15;  

Start 4/18/17 at 21:00


Acetaminophen 650 mg 650 mg PRN Q6HRS  PRN PO HEADACHE Last administered on 4/18 /17at 19:50;  Start 4/18/17 at 19:45


Heparin Sodium/ Sodium Chloride 1,000 ml @  As Directed STK-MED ONCE .ROUTE ;  

Start 4/19/17 at 08:52;  Stop 4/19/17 at 08:53;  Status DC


Lidocaine HCl 20 ml STK-MED ONCE .ROUTE ;  Start 4/19/17 at 08:52;  Stop 4/19/ 17 at 08:53;  Status DC


Iodixanol (Visipaque 320) 100 ml STK-MED ONCE .ROUTE ;  Start 4/19/17 at 08:52;

  Stop 4/19/17 at 08:53;  Status DC


Fentanyl Citrate (Fentanyl 5ml Vial) 250 mcg STK-MED ONCE .ROUTE ;  Start 4/19/ 17 at 11:18;  Stop 4/19/17 at 11:19;  Status DC


Midazolam HCl (Versed) 5 mg STK-MED ONCE .ROUTE ;  Start 4/19/17 at 11:18;  

Stop 4/19/17 at 11:19;  Status DC


Heparin Sodium/ Sodium Chloride 1,000 unit 1X  ONCE IART  Last administered on 4 /19/17at 12:16;  Start 4/19/17 at 11:30;  Stop 4/19/17 at 11:31;  Status DC


Midazolam HCl (Versed) 5 mg 1X  ONCE IV  Last administered on 4/19/17at 12:17;  

Start 4/19/17 at 11:30;  Stop 4/19/17 at 11:31;  Status DC


Fentanyl Citrate (Fentanyl 5ml Vial) 250 mcg 1X  ONCE IV  Last administered on 4 /19/17at 12:17;  Start 4/19/17 at 11:30;  Stop 4/19/17 at 11:31;  Status DC


Iodixanol (Visipaque 320) 100 ml 1X  ONCE IART  Last administered on 4/19/17at 

12:16;  Start 4/19/17 at 11:30;  Stop 4/19/17 at 11:31;  Status DC


Lidocaine HCl 20 ml 1X  ONCE IJ  Last administered on 4/19/17at 12:16;  Start 4/ 19/17 at 11:30;  Stop 4/19/17 at 11:31;  Status DC


Potassium Chloride (Klor-Con) 40 meq 1X  ONCE PO  Last administered on 4/20/ 17at 11:52;  Start 4/20/17 at 08:30;  Stop 4/20/17 at 08:31;  Status DC


Neomycin/ Polymyxin/ Bacitracin (Triple Antibiotic Ointment) 1 pkt BID TP  Last 

administered on 4/22/17at 08:15;  Start 4/20/17 at 09:00


Nitroglycerin (Nitrostat) 0.4 mg STK-MED ONCE SL ;  Start 4/20/17 at 13:09;  

Stop 4/20/17 at 13:10;  Status DC


Nitroglycerin (Nitrostat) 0.4 mg PRN Q5MIN  PRN SL CHEST PAIN Last administered 

on 4/20/17at 14:57;  Start 4/20/17 at 13:15


Diltiazem HCl 10 mg 10 mg 1X  ONCE IVP ;  Start 4/20/17 at 13:45;  Stop 4/20/17 

at 13:46;  Status DC


Diltiazem HCl 125 mg/Dextrose 125 ml @ 0 mls/hr CONT  PRN IV SEE I/O RECORD;  

Start 4/20/17 at 13:30


Sodium Chloride (Iv Sodium Chloride 0.45%) 1,000 ml @  60 mls/hr [id number] IV  

Last administered on 4/21/17at 08:34;  Start 4/21/17 at 08:00;  Stop 4/21/17 at 

21:33;  Status DC


Sennosides (Senna) 17.2 mg PRN BID  PRN PO CONSTIPATION, 2ND CHOICE Last 

administered on 4/21/17at 08:32;  Start 4/21/17 at 08:00


Polyethylene Glycol (miraLAX PACKET) 17 gm PRN DAILY  PRN PO CONSTIPATION, 1ST 

CHOICE;  Start 4/21/17 at 08:00


Nitroglycerin 0.4 mg 0.4 mg STK-MED ONCE SL ;  Start 4/20/17 at 13:00;  Stop 4/ 21/17 at 08:33;  Status DC


Heparin Sodium/ Sodium Chloride 1,000 ml @  As Directed STK-MED ONCE .ROUTE ;  

Start 4/21/17 at 10:08;  Stop 4/21/17 at 10:09;  Status DC


Lidocaine HCl 20 ml STK-MED ONCE .ROUTE ;  Start 4/21/17 at 10:09;  Stop 4/21/ 17 at 10:10;  Status DC


Iodixanol (Visipaque 320) 100 ml STK-MED ONCE .ROUTE ;  Start 4/21/17 at 10:09;

  Stop 4/21/17 at 10:10;  Status DC


Heparin Sodium (Porcine) (Heparin Sodium) 10,000 unit STK-MED ONCE .ROUTE ;  

Start 4/21/17 at 10:09;  Stop 4/21/17 at 10:10;  Status DC


Heparin Sodium (Porcine) (Heparin Sodium) 10,000 unit STK-MED ONCE .ROUTE ;  

Start 4/21/17 at 10:50;  Stop 4/21/17 at 10:51;  Status DC


Fentanyl Citrate (Fentanyl 5ml Vial) 250 mcg STK-MED ONCE .ROUTE ;  Start 4/21/ 17 at 10:51;  Stop 4/21/17 at 10:52;  Status DC


Midazolam HCl (Versed) 5 mg STK-MED ONCE .ROUTE ;  Start 4/21/17 at 10:51;  

Stop 4/21/17 at 10:52;  Status DC


Heparin Sodium/ Sodium Chloride 1,000 unit 1X  ONCE IART  Last administered on 4 /21/17at 11:22;  Start 4/21/17 at 11:00;  Stop 4/21/17 at 11:03;  Status DC


Midazolam HCl (Versed) 5 mg 1X  ONCE IV  Last administered on 4/21/17at 12:03;  

Start 4/21/17 at 11:00;  Stop 4/21/17 at 11:03;  Status DC


Fentanyl Citrate (Fentanyl 5ml Vial) 250 mcg 1X  ONCE IV  Last administered on 4 /21/17at 12:03;  Start 4/21/17 at 11:00;  Stop 4/21/17 at 11:03;  Status DC


Iodixanol (Visipaque 320) 100 ml 1X  ONCE IART  Last administered on 4/21/17at 

12:04;  Start 4/21/17 at 11:00;  Stop 4/21/17 at 11:03;  Status DC


Lidocaine HCl 20 ml 1X  ONCE IJ  Last administered on 4/21/17at 11:22;  Start 4/ 21/17 at 11:00;  Stop 4/21/17 at 11:03;  Status DC


Heparin Sodium (Porcine) 67977 unit 10,000 unit 1X  ONCE IV  Last administered 

on 4/21/17at 12:08;  Start 4/21/17 at 11:15;  Stop 4/21/17 at 11:16;  Status DC


Tirofiban/Sodium Chloride (Aggrastat 5 Mg/ 100 ml Premix) 100 ml @  As Directed 

STK-MED ONCE IV ;  Start 4/21/17 at 11:38;  Stop 4/21/17 at 11:39;  Status DC


Clopidogrel Bisulfate 300 mg 300 mg 1X  ONCE PO  Last administered on 4/21/17at 

12:04;  Start 4/21/17 at 11:45;  Stop 4/21/17 at 11:46;  Status DC


Tirofiban/Sodium Chloride 100 ml @ 0 mls/hr 1X  ONCE IV  Last administered on 4/ 21/17at 12:04;  Start 4/21/17 at 11:45;  Stop 4/21/17 at 11:46;  Status DC


Heparin Sodium/ Sodium Chloride 500 ml @  As Directed STK-MED ONCE .ROUTE ;  

Start 4/21/17 at 11:58;  Stop 4/21/17 at 11:59;  Status DC


Nitroglycerin (Nitroglycerin) 200 mcg 1X  ONCE IART  Last administered on 4/21/ 17at 12:11;  Start 4/21/17 at 12:15;  Stop 4/21/17 at 12:16;  Status DC


Fentanyl Citrate (Fentanyl 2ml Vial) 100 mcg STK-MED ONCE .ROUTE ;  Start 4/21/ 17 at 14:48;  Stop 4/21/17 at 14:49;  Status DC


Fentanyl Citrate (Fentanyl 2ml Vial) 50 mcg 1X  ONCE IV  Last administered on 4/ 21/17at 15:15;  Start 4/21/17 at 15:15;  Stop 4/21/17 at 15:22;  Status DC


Midazolam HCl (Versed) 1 mg 1X  ONCE IV  Last administered on 4/21/17at 15:15;  

Start 4/21/17 at 15:15;  Stop 4/21/17 at 15:22;  Status DC


Potassium Chloride (Klor-Con) 40 meq 1X  ONCE PO  Last administered on 4/21/ 17at 15:28;  Start 4/21/17 at 15:15;  Stop 4/21/17 at 15:22;  Status DC


Diazepam (Valium) 5 mg PRN Q4HRS  PRN PO ANXIETY Last administered on 4/21/17at 

15:27;  Start 4/21/17 at 15:15


Aspirin (Reggie Aspirin) 325 mg DAILYWBKFT PO ;  Start 4/21/17 at 16:00;  Stop 4/ 21/17 at 18:02;  Status DC


Clopidogrel Bisulfate (Plavix) 75 mg DAILYWBKFT PO ;  Start 4/21/17 at 16:00;  

Stop 4/21/17 at 18:03;  Status DC


Aspirin (Reggie Aspirin) 325 mg DAILYWBKFT PO  Last administered on 4/22/17at 08:

13;  Start 4/22/17 at 09:00


Clopidogrel Bisulfate (Plavix) 75 mg DAILYWBKFT PO  Last administered on 4/22/ 17at 08:14;  Start 4/22/17 at 09:00


Info (Anti-Coagulation Monitoring By Pharmacy) 1 each PRN DAILY  PRN MC SEE 

COMMENTS Last administered on 4/22/17at 13:23;  Start 4/22/17 at 13:15





Active Scripts


Active


Reported


Stiolto Respimat Inhal Spray (Tiotropium Br/Olodaterol HCl) 4 Gm Mist.inhal 4 

Gm IH DAILY


Novolog Flexpen (Insulin Aspart) 100 Unit/1 Ml Insuln.pen 1 Unit SQ 


Ventolin Hfa Inhaler (Albuterol Sulfate) 18 Gm Hfa.aer.ad 2 Puff INH QID


Flovent 110MCG Hfa (Fluticasone Propionate) 12 Gm Aer.w.adap 2 Puff IH BID


Losartan Potassium 25 Mg Tablet 100 Mg PO DAILY


Levemir (Insulin Detemir) 100 Unit/1 Ml Vial 70 Unit SQ QHS


Vitals/I & O





 Vital Sign - Last 24 Hours








 4/21/17 4/21/17 4/21/17 4/21/17





 16:00 16:35 17:37 18:02


 


Temp    98.2





    98.2


 


Pulse 80   81


 


Resp 18   18


 


B/P 109/44   110/48


 


Pulse Ox 99 98  99


 


O2 Delivery Nasal Cannula Nasal Cannula Nasal Cannula Nasal Cannula


 


O2 Flow Rate 3.0 3.0 3.0 3.0


 


    





    





 4/21/17 4/21/17 4/21/17 4/21/17





 19:00 20:00 20:00 20:07


 


Temp   99.9 





   99.9 


 


Pulse 82  78 


 


Resp 18  20 


 


B/P 140/53  131/46 


 


Pulse Ox 96  96 95


 


O2 Delivery Nasal Cannula Nasal Cannula Nasal Cannula Nasal Cannula


 


O2 Flow Rate 3.0 3.0 3.0 3.0


 


    





    





 4/21/17 4/21/17 4/21/17 4/21/17





 21:00 22:00 23:00 23:52


 


Temp    99.6





    99.6


 


Pulse 76 73 73 89


 


Resp 16 20 18 20


 


B/P 135/61 130/48 127/78 114/74


 


Pulse Ox 95 95 96 96


 


O2 Delivery Nasal Cannula Nasal Cannula Nasal Cannula Nasal Cannula


 


O2 Flow Rate 3.0 3.0 3.0 3.0


 


    





    





 4/21/17 4/22/17 4/22/17 4/22/17





 23:52 01:00 02:00 02:55


 


Pulse  74 62 68


 


Resp  22 20 20


 


B/P  146/73 144/57 114/57


 


Pulse Ox  96 98 97


 


O2 Delivery Nasal Cannula Nasal Cannula Nasal Cannula Nasal Cannula


 


O2 Flow Rate 3.0 3.0 3.0 3.0





 4/22/17 4/22/17 4/22/17 4/22/17





 03:39 03:55 05:00 06:00


 


Temp  99.0  





  99.0  


 


Pulse  64 61 63


 


Resp  22 20 20


 


B/P  117/63 148/76 150/72


 


Pulse Ox  98 98 97


 


O2 Delivery Nasal Cannula Nasal Cannula Nasal Cannula Nasal Cannula


 


O2 Flow Rate 3.0 3.0 3.0 3.0


 


    





    





 4/22/17 4/22/17 4/22/17 4/22/17





 07:00 08:00 08:00 08:14


 


Temp  98.5  





  98.5  


 


Pulse 55 74  76


 


Resp 20 20  


 


B/P 151/63 149/73  151/63


 


Pulse Ox 97 97  


 


O2 Delivery Nasal Cannula Nasal Cannula Nasal Cannula 


 


O2 Flow Rate 3.0 3.0 3.0 


 


    





    





 4/22/17   





 12:00   


 


Temp 98.4   





 98.4   


 


Pulse 68   


 


Resp 20   


 


B/P 120/62   


 


Pulse Ox 98   


 


O2 Delivery Nasal Cannula   


 


O2 Flow Rate 3.0   














 Intake and Output   


 


 4/21/17 4/21/17 4/22/17





 15:00 23:00 07:00


 


Intake Total 410 ml 1259 ml 1200 ml


 


Output Total 300 ml 375 ml 1300 ml


 


Balance 110 ml 884 ml -100 ml














URSULA CHU MD Apr 22, 2017 15:35

## 2017-04-22 NOTE — PDOC
PROGRESS NOTES


Subjective


Subjective


Patient denies any CP or SOA, feels good.





Objective


Objective





 Vital Signs








  Date Time  Temp Pulse Resp B/P Pulse Ox O2 Delivery O2 Flow Rate FiO2


 


4/22/17 08:14  76  151/63    


 


4/22/17 07:00   20  97 Nasal Cannula 3.0 


 


4/22/17 03:55 99.0       





 99.0       














 Intake and Output 


 


 4/22/17





 06:59


 


Intake Total 2869 ml


 


Output Total 1975 ml


 


Balance 894 ml


 


 


 


Intake Oral 2090 ml


 


IV Total 779 ml


 


Output Urine Total 1975 ml











Physical Exam


Abdomen:  Normal bowel sounds, Soft, No tenderness


Heart:  Regular rate (frequent prematurities)


Extremities:  No edema


General:  Alert, Oriented X3, No acute distress


Lungs:  Other (BS decreased throughout but otherwise CTA)





Assessment


Assessment


 Problems


Medical Problems:


(1) Acute on chronic diastolic CHF (congestive heart failure)


Status: Acute  





(2) Acute on chronic respiratory failure


Status: Acute  





(3) Cardiac ischemia


Status: Acute  





(4) COPD exacerbation


Status: Acute  











Plan


Plan of Care


1. CAD, s/p angioplasty and stent placement x2 yesterday - stable, free of 

chest pain. Continue ASA and Plavix. In sinus rhythm on telemetry but frequent  

PAC's and rate somewhat irregular. Continue to monitor, Dr Eastman following.


2. acute on chronic respiratory failure with COPD - improved from admission, 

continue O2, which patient is on at home already.


3. systolic CHF - improved.


4. DM2 - well controlled with insulins.


5. HTN - well controlled, continue present meds. 


6. lung mass - Dr Thorpe recommends PET scan as outpatient.





Comment


Review of Relevant


I have reviewed the following items elfego (where applicable) has been applied.


Labs





Laboratory Tests








Test


  4/20/17


11:51 4/20/17


13:17 4/20/17


16:40 4/20/17


20:34


 


Glucose (Fingerstick)


  118mg/dL


(70-99) 193mg/dL


(70-99) 224mg/dL


(70-99) 143mg/dL


(70-99)














Test


  4/21/17


07:58 4/21/17


13:34 4/21/17


14:30 4/21/17


17:53


 


Glucose (Fingerstick)


  174mg/dL


(70-99) 120mg/dL


(70-99) 


  168mg/dL


(70-99)


 


Activated Clotting Time   SEC ()  














Test


  4/21/17


20:17 4/22/17


08:12 


  


 


 


Glucose (Fingerstick)


  164mg/dL


(70-99) 138mg/dL


(70-99) 


  


 








Laboratory Tests








Test


  4/21/17


13:34 4/21/17


14:30 4/21/17


17:53 4/21/17


20:17


 


Glucose (Fingerstick)


  120mg/dL


(70-99) 


  168mg/dL


(70-99) 164mg/dL


(70-99)


 


Activated Clotting Time  SEC ()   














Test


  4/22/17


08:12 


  


  


 


 


Glucose (Fingerstick)


  138mg/dL


(70-99) 


  


  


 








Microbiology


4/17/17 Blood Culture - Final, Complete


          NO GROWTH AFTER 5 DAYS


Medications





 Current Medications


Nitroglycerin/ Dextrose (Nitroglycerin Drip) 250 ml @ 0 mls/hr CONT  PRN IV SEE 

I/O RECORD Last administered on 4/17/17at 05:15;  Start 4/17/17 at 05:15


Albuterol/ Ipratropium (Duoneb) 6 ml 1X  ONCE NEB  Last administered on 4/17/ 17at 05:17;  Start 4/17/17 at 05:30;  Stop 4/17/17 at 05:31;  Status DC


Furosemide (Lasix) 60 mg 1X  ONCE IVP  Last administered on 4/17/17at 06:21;  

Start 4/17/17 at 06:30;  Stop 4/17/17 at 06:31;  Status DC


Ondansetron HCl 4 mg 4 mg PRN Q8HRS  PRN IV NAUSEA/VOMITING;  Start 4/17/17 at 

06:15;  Stop 4/18/17 at 06:14;  Status DC


Sodium Chloride (Iv Sodium Chloride 0.9% 1000ml Bag) 1,000 ml @  30 mls/hr Q24H 

IV  Last administered on 4/17/17at 06:21;  Start 4/17/17 at 06:30;  Stop 4/18/ 17 at 06:29;  Status DC


Acetaminophen (Tylenol) 650 mg PRN Q4HRS  PRN PO FEVER Last administered on 4/17 /17at 20:35;  Start 4/17/17 at 06:15;  Stop 4/18/17 at 06:14;  Status DC


Albuterol/ Ipratropium (Duoneb) 3 ml RTQID NEB  Last administered on 4/18/17at 

07:39;  Start 4/17/17 at 08:00;  Stop 4/18/17 at 07:59;  Status DC


Fluticasone Propionate (Flonase) 2 spray DAILY NS  Last administered on 4/18/ 17at 09:53;  Start 4/18/17 at 09:00


Non-Formulary Medication 4 gm DAILY IH ;  Start 4/18/17 at 09:00;  Status UNV


Insulin Aspart (Novolog) 25 units TIDAC SQ ;  Start 4/17/17 at 11:30;  Stop 4/17 /17 at 11:55;  Status DC


Losartan Potassium (Cozaar) 25 mg DAILY PO ;  Start 4/18/17 at 09:00;  Stop 4/18 /17 at 09:00;  Status DC


Albuterol Sulfate (Ventolin Neb Soln) 2.5 mg PRN Q4HRS  PRN NEB SHORTNESS OF 

BREATH;  Start 4/17/17 at 09:15


Insulin Detemir (Levemir) 70 units QHS SQ  Last administered on 4/21/17at 20:54

;  Start 4/17/17 at 21:00


Losartan Potassium (Cozaar) 25 mg DAILY PO  Last administered on 4/17/17at 10:04

;  Start 4/17/17 at 09:15;  Stop 4/18/17 at 07:42;  Status DC


Furosemide (Lasix) 20 mg DAILY PO  Last administered on 4/22/17at 08:14;  Start 

4/17/17 at 09:15


Insulin Aspart (Novolog) 0-7 UNITS TIDWMEALS SQ  Last administered on 4/18/17at 

13:14;  Start 4/17/17 at 12:00


Dextrose (Dextrose 50%-Water Syringe) 12.5 gm PRN Q15MIN  PRN IV SEE COMMENTS;  

Start 4/17/17 at 09:15


Insulin Aspart (Novolog) 15 units TIDAC SQ  Last administered on 4/18/17at 13:13

;  Start 4/17/17 at 12:00;  Stop 4/18/17 at 14:29;  Status DC


Furosemide (Lasix) 80 mg Q12HR PO  Last administered on 4/18/17at 21:13;  Start 

4/17/17 at 21:00;  Stop 4/19/17 at 00:00;  Status DC


Furosemide (Lasix) 40 mg 1X  ONCE PO  Last administered on 4/17/17at 13:13;  

Start 4/17/17 at 12:45;  Stop 4/17/17 at 12:50;  Status DC


Furosemide (Lasix) 20 mg 1X  ONCE PO ;  Start 4/17/17 at 12:45;  Stop 4/17/17 

at 12:46;  Status Cancel


Enoxaparin Sodium (Lovenox 80mg Syringe) 80 mg Q12H SQ  Last administered on 4/ 22/17at 04:36;  Start 4/17/17 at 17:00


Cyclobenzaprine HCl (Flexeril) 5 mg PRN Q6HRS  PRN PO MUSCLE SPASMS;  Start 4/18 /17 at 07:45


Losartan Potassium (Cozaar) 50 mg DAILY PO  Last administered on 4/22/17at 08:14

;  Start 4/18/17 at 09:00


Albuterol/ Ipratropium (Duoneb) 3 ml RTQID NEB  Last administered on 4/21/17at 

20:06;  Start 4/18/17 at 12:00


Insulin Aspart (Novolog) 30 units TIDAC SQ  Last administered on 4/22/17at 08:27

;  Start 4/18/17 at 16:30


Acetylcysteine (Mucomyst 20% Oral Solution) 600 mg Q8H PO  Last administered on 

4/19/17at 06:50;  Start 4/18/17 at 22:00;  Stop 4/19/17 at 06:01;  Status DC


Non-Formulary Medication 1 ea BID INH  Last administered on 4/22/17at 08:15;  

Start 4/18/17 at 21:00


Acetaminophen 650 mg 650 mg PRN Q6HRS  PRN PO HEADACHE Last administered on 4/18 /17at 19:50;  Start 4/18/17 at 19:45


Heparin Sodium/ Sodium Chloride 1,000 ml @  As Directed STK-MED ONCE .ROUTE ;  

Start 4/19/17 at 08:52;  Stop 4/19/17 at 08:53;  Status DC


Lidocaine HCl 20 ml STK-MED ONCE .ROUTE ;  Start 4/19/17 at 08:52;  Stop 4/19/ 17 at 08:53;  Status DC


Iodixanol (Visipaque 320) 100 ml STK-MED ONCE .ROUTE ;  Start 4/19/17 at 08:52;

  Stop 4/19/17 at 08:53;  Status DC


Fentanyl Citrate (Fentanyl 5ml Vial) 250 mcg STK-MED ONCE .ROUTE ;  Start 4/19/ 17 at 11:18;  Stop 4/19/17 at 11:19;  Status DC


Midazolam HCl (Versed) 5 mg STK-MED ONCE .ROUTE ;  Start 4/19/17 at 11:18;  

Stop 4/19/17 at 11:19;  Status DC


Heparin Sodium/ Sodium Chloride 1,000 unit 1X  ONCE IART  Last administered on 4 /19/17at 12:16;  Start 4/19/17 at 11:30;  Stop 4/19/17 at 11:31;  Status DC


Midazolam HCl (Versed) 5 mg 1X  ONCE IV  Last administered on 4/19/17at 12:17;  

Start 4/19/17 at 11:30;  Stop 4/19/17 at 11:31;  Status DC


Fentanyl Citrate (Fentanyl 5ml Vial) 250 mcg 1X  ONCE IV  Last administered on 4 /19/17at 12:17;  Start 4/19/17 at 11:30;  Stop 4/19/17 at 11:31;  Status DC


Iodixanol (Visipaque 320) 100 ml 1X  ONCE IART  Last administered on 4/19/17at 

12:16;  Start 4/19/17 at 11:30;  Stop 4/19/17 at 11:31;  Status DC


Lidocaine HCl 20 ml 1X  ONCE IJ  Last administered on 4/19/17at 12:16;  Start 4/ 19/17 at 11:30;  Stop 4/19/17 at 11:31;  Status DC


Potassium Chloride (Klor-Con) 40 meq 1X  ONCE PO  Last administered on 4/20/ 17at 11:52;  Start 4/20/17 at 08:30;  Stop 4/20/17 at 08:31;  Status DC


Neomycin/ Polymyxin/ Bacitracin (Triple Antibiotic Ointment) 1 pkt BID TP  Last 

administered on 4/22/17at 08:15;  Start 4/20/17 at 09:00


Nitroglycerin (Nitrostat) 0.4 mg STK-MED ONCE SL ;  Start 4/20/17 at 13:09;  

Stop 4/20/17 at 13:10;  Status DC


Nitroglycerin (Nitrostat) 0.4 mg PRN Q5MIN  PRN SL CHEST PAIN Last administered 

on 4/20/17at 14:57;  Start 4/20/17 at 13:15


Diltiazem HCl 10 mg 10 mg 1X  ONCE IVP ;  Start 4/20/17 at 13:45;  Stop 4/20/17 

at 13:46;  Status DC


Diltiazem HCl 125 mg/Dextrose 125 ml @ 0 mls/hr CONT  PRN IV SEE I/O RECORD;  

Start 4/20/17 at 13:30


Sodium Chloride (Iv Sodium Chloride 0.45%) 1,000 ml @  60 mls/hr [id number] IV  

Last administered on 4/21/17at 08:34;  Start 4/21/17 at 08:00;  Stop 4/21/17 at 

21:33;  Status DC


Sennosides (Senna) 17.2 mg PRN BID  PRN PO CONSTIPATION, 2ND CHOICE Last 

administered on 4/21/17at 08:32;  Start 4/21/17 at 08:00


Polyethylene Glycol (miraLAX PACKET) 17 gm PRN DAILY  PRN PO CONSTIPATION, 1ST 

CHOICE;  Start 4/21/17 at 08:00


Nitroglycerin 0.4 mg 0.4 mg STK-MED ONCE SL ;  Start 4/20/17 at 13:00;  Stop 4/ 21/17 at 08:33;  Status DC


Heparin Sodium/ Sodium Chloride 1,000 ml @  As Directed STK-MED ONCE .ROUTE ;  

Start 4/21/17 at 10:08;  Stop 4/21/17 at 10:09;  Status DC


Lidocaine HCl 20 ml STK-MED ONCE .ROUTE ;  Start 4/21/17 at 10:09;  Stop 4/21/ 17 at 10:10;  Status DC


Iodixanol (Visipaque 320) 100 ml STK-MED ONCE .ROUTE ;  Start 4/21/17 at 10:09;

  Stop 4/21/17 at 10:10;  Status DC


Heparin Sodium (Porcine) (Heparin Sodium) 10,000 unit STK-MED ONCE .ROUTE ;  

Start 4/21/17 at 10:09;  Stop 4/21/17 at 10:10;  Status DC


Heparin Sodium (Porcine) (Heparin Sodium) 10,000 unit STK-MED ONCE .ROUTE ;  

Start 4/21/17 at 10:50;  Stop 4/21/17 at 10:51;  Status DC


Fentanyl Citrate (Fentanyl 5ml Vial) 250 mcg STK-MED ONCE .ROUTE ;  Start 4/21/ 17 at 10:51;  Stop 4/21/17 at 10:52;  Status DC


Midazolam HCl (Versed) 5 mg STK-MED ONCE .ROUTE ;  Start 4/21/17 at 10:51;  

Stop 4/21/17 at 10:52;  Status DC


Heparin Sodium/ Sodium Chloride 1,000 unit 1X  ONCE IART  Last administered on 4 /21/17at 11:22;  Start 4/21/17 at 11:00;  Stop 4/21/17 at 11:03;  Status DC


Midazolam HCl (Versed) 5 mg 1X  ONCE IV  Last administered on 4/21/17at 12:03;  

Start 4/21/17 at 11:00;  Stop 4/21/17 at 11:03;  Status DC


Fentanyl Citrate (Fentanyl 5ml Vial) 250 mcg 1X  ONCE IV  Last administered on 4 /21/17at 12:03;  Start 4/21/17 at 11:00;  Stop 4/21/17 at 11:03;  Status DC


Iodixanol (Visipaque 320) 100 ml 1X  ONCE IART  Last administered on 4/21/17at 

12:04;  Start 4/21/17 at 11:00;  Stop 4/21/17 at 11:03;  Status DC


Lidocaine HCl 20 ml 1X  ONCE IJ  Last administered on 4/21/17at 11:22;  Start 4/ 21/17 at 11:00;  Stop 4/21/17 at 11:03;  Status DC


Heparin Sodium (Porcine) 92380 unit 10,000 unit 1X  ONCE IV  Last administered 

on 4/21/17at 12:08;  Start 4/21/17 at 11:15;  Stop 4/21/17 at 11:16;  Status DC


Tirofiban/Sodium Chloride (Aggrastat 5 Mg/ 100 ml Premix) 100 ml @  As Directed 

STK-MED ONCE IV ;  Start 4/21/17 at 11:38;  Stop 4/21/17 at 11:39;  Status DC


Clopidogrel Bisulfate 300 mg 300 mg 1X  ONCE PO  Last administered on 4/21/17at 

12:04;  Start 4/21/17 at 11:45;  Stop 4/21/17 at 11:46;  Status DC


Tirofiban/Sodium Chloride 100 ml @ 0 mls/hr 1X  ONCE IV  Last administered on 4/ 21/17at 12:04;  Start 4/21/17 at 11:45;  Stop 4/21/17 at 11:46;  Status DC


Heparin Sodium/ Sodium Chloride 500 ml @  As Directed STK-MED ONCE .ROUTE ;  

Start 4/21/17 at 11:58;  Stop 4/21/17 at 11:59;  Status DC


Nitroglycerin (Nitroglycerin) 200 mcg 1X  ONCE IART  Last administered on 4/21/ 17at 12:11;  Start 4/21/17 at 12:15;  Stop 4/21/17 at 12:16;  Status DC


Fentanyl Citrate (Fentanyl 2ml Vial) 100 mcg STK-MED ONCE .ROUTE ;  Start 4/21/ 17 at 14:48;  Stop 4/21/17 at 14:49;  Status DC


Fentanyl Citrate (Fentanyl 2ml Vial) 50 mcg 1X  ONCE IV  Last administered on 4/ 21/17at 15:15;  Start 4/21/17 at 15:15;  Stop 4/21/17 at 15:22;  Status DC


Midazolam HCl (Versed) 1 mg 1X  ONCE IV  Last administered on 4/21/17at 15:15;  

Start 4/21/17 at 15:15;  Stop 4/21/17 at 15:22;  Status DC


Potassium Chloride (Klor-Con) 40 meq 1X  ONCE PO  Last administered on 4/21/ 17at 15:28;  Start 4/21/17 at 15:15;  Stop 4/21/17 at 15:22;  Status DC


Diazepam (Valium) 5 mg PRN Q4HRS  PRN PO ANXIETY Last administered on 4/21/17at 

15:27;  Start 4/21/17 at 15:15


Aspirin (Reggie Aspirin) 325 mg DAILYWBKFT PO ;  Start 4/21/17 at 16:00;  Stop 4/ 21/17 at 18:02;  Status DC


Clopidogrel Bisulfate (Plavix) 75 mg DAILYWBKFT PO ;  Start 4/21/17 at 16:00;  

Stop 4/21/17 at 18:03;  Status DC


Aspirin (Reggie Aspirin) 325 mg DAILYWBKFT PO  Last administered on 4/22/17at 08:

13;  Start 4/22/17 at 09:00


Clopidogrel Bisulfate (Plavix) 75 mg DAILYWBKFT PO  Last administered on 4/22/ 17at 08:14;  Start 4/22/17 at 09:00





Active Scripts


Active


Reported


Stiolto Respimat Inhal Spray (Tiotropium Br/Olodaterol HCl) 4 Gm Mist.inhal 4 

Gm IH DAILY


Novolog Flexpen (Insulin Aspart) 100 Unit/1 Ml Insuln.pen 1 Unit SQ 


Ventolin Hfa Inhaler (Albuterol Sulfate) 18 Gm Hfa.aer.ad 2 Puff INH QID


Flovent 110MCG Hfa (Fluticasone Propionate) 12 Gm Aer.w.adap 2 Puff IH BID


Losartan Potassium 25 Mg Tablet 100 Mg PO DAILY


Levemir (Insulin Detemir) 100 Unit/1 Ml Vial 70 Unit SQ QHS


Vitals/I & O





 Vital Sign - Last 24 Hours








 4/21/17 4/21/17 4/21/17 4/21/17





 11:53 12:03 12:30 12:30


 


Temp    98.3





    98.3


 


Pulse 49   66


 


Resp 23 23  18


 


B/P    139/52


 


Pulse Ox 98 99  99


 


O2 Delivery Nasal Cannula Nasal Cannula Nasal Cannula Nasal Cannula


 


O2 Flow Rate 2.0 3.0 3.0 3.0


 


    





    





 4/21/17 4/21/17 4/21/17 4/21/17





 13:00 13:15 13:45 14:00


 


Pulse 70 74 74 72


 


Resp 18 18 18 18


 


B/P 142/66 149/57 155/58 143/72


 


Pulse Ox 99 99 99 99


 


O2 Delivery Nasal Cannula Nasal Cannula Nasal Cannula Nasal Cannula


 


O2 Flow Rate 3.0 3.0 3.0 3.0





 4/21/17 4/21/17 4/21/17 4/21/17





 15:00 15:15 16:00 16:35


 


Pulse 76  80 


 


Resp 18  18 


 


B/P 106/76  109/44 


 


Pulse Ox 99 99 99 98


 


O2 Delivery Nasal Cannula Nasal Cannula Nasal Cannula Nasal Cannula


 


O2 Flow Rate 3.0 3.0 3.0 3.0





 4/21/17 4/21/17 4/21/17 4/21/17





 17:37 18:02 19:00 20:00


 


Temp  98.2  





  98.2  


 


Pulse  81 82 


 


Resp  18 18 


 


B/P  110/48 140/53 


 


Pulse Ox  99 96 


 


O2 Delivery Nasal Cannula Nasal Cannula Nasal Cannula Nasal Cannula


 


O2 Flow Rate 3.0 3.0 3.0 3.0


 


    





    





 4/21/17 4/21/17 4/21/17 4/21/17





 20:00 20:07 21:00 22:00


 


Temp 99.9   





 99.9   


 


Pulse 78  76 73


 


Resp 20 16 20


 


B/P 131/46  135/61 130/48


 


Pulse Ox 96 95 95 95


 


O2 Delivery Nasal Cannula Nasal Cannula Nasal Cannula Nasal Cannula


 


O2 Flow Rate 3.0 3.0 3.0 3.0


 


    





    





 4/21/17 4/21/17 4/21/17 4/22/17





 23:00 23:52 23:52 01:00


 


Temp  99.6  





  99.6  


 


Pulse 73 89  74


 


Resp 18 20 22


 


B/P 127/78 114/74  146/73


 


Pulse Ox 96 96  96


 


O2 Delivery Nasal Cannula Nasal Cannula Nasal Cannula Nasal Cannula


 


O2 Flow Rate 3.0 3.0 3.0 3.0


 


    





    





 4/22/17 4/22/17 4/22/17 4/22/17





 02:00 02:55 03:39 03:55


 


Temp    99.0





    99.0


 


Pulse 62 68  64


 


Resp 20 20 22


 


B/P 144/57 114/57  117/63


 


Pulse Ox 98 97  98


 


O2 Delivery Nasal Cannula Nasal Cannula Nasal Cannula Nasal Cannula


 


O2 Flow Rate 3.0 3.0 3.0 3.0


 


    





    





 4/22/17 4/22/17 4/22/17 4/22/17





 05:00 06:00 07:00 08:14


 


Pulse 61 63 55 76


 


Resp 20 20 20 


 


B/P 148/76 150/72 151/63 151/63


 


Pulse Ox 98 97 97 


 


O2 Delivery Nasal Cannula Nasal Cannula Nasal Cannula 


 


O2 Flow Rate 3.0 3.0 3.0 














 Intake and Output   


 


 4/21/17 4/21/17 4/22/17





 14:59 22:59 06:59


 


Intake Total 410 ml 1259 ml 1200 ml


 


Output Total 300 ml 375 ml 1300 ml


 


Balance 110 ml 884 ml -100 ml














HARRY GRIFFITHS MD Apr 22, 2017 10:12

## 2017-04-22 NOTE — RAD
--------------- APPROVED REPORT --------------





Imaging Protocol

IMAGE PROTOCOL: Viability only



Rest:            Stress:         Viability:   

Radiopharm.ThalliumThallium

[id number]            1mCi

Duration    20min.           25min.

Img Date  04/20/2017 04/20/2017

Inj-Img [id number].           240min.



Rest Admin Site:IV - Right AntecubitalAdministrator:PORFIRIO Glynn, ARRT (R)(N)

Viability Admin Site:IV - Right AntecubitalAdministrator: PATRICE Rose



Viability without Stress

The patient was injected with 3.2mCi of Thallium 201 in the IV - Right Antecubital by PATRICE Glynn at , 09:16 Date/Time.

Initial Imaging was performed by PATRICE Rose at , 1030 Date/Time.

The patient was injected with 1.1mCi of Thallium 201 in the IV - Right Antecubital by PATRICE Rose at , 1100 Date/Time.

Delayed Images were acquired by PATRICE Rose at , 1400 Date/Time.

Motion Correction: No



STRESS DATA

End Diast. Vol.157.0mlAv. Heart Rate88.0bpm

End Syst. Vol.81.0mlCO Index BSA0.0L/min

Myocardial Baom955.0gEject. Mfvxffzy51.0%



Stress Scores

Regional WT3.00Summed WT20.00

Regional WM1.00Summed WM21.00



LV Perf. Quant

17 Seg. SSS12.00

Stress Defect Extent (% LAD)56.90Rest Defect Extent (% LAD)Rev. Defect Extent (% LAD)22.50

Stress Defect Extent (% LCX) 3.80Rest Defect Extent (% LCX)Rev. Defect Extent (% LCX)0.00

Stress Defect Extent (% RCA)1.10Rest Defect Extent (% RCA)Rev. Defect Extent (% RCA)0.00

Stress Defect Extent (% JARVIS)29.10Rest Defect Extent (% JARVIS)Rev. Defect Extent (% JARVIS)8.50



Conclusion

1. A viability  study was requested  while  considering   revascularization in this patient with know
n coronary artery disease.

2. 3.2 mCi of thallium was injected and images  images were obtained

3. After waiting  for 3  hours  an additional 1 mCi of thallium was injected. Further images were obt
ained thereafter.

4. A scan in 24 hours was not obtained.

5. The images that were obtained shows only about 50% uptake over the anterior wall.

6. However since a  24 hour imaging was not obtained one cannot be sure whether further viability wou
ld have been noted.

7. The rest of the myocardium over the septum, lateral wall, inferior wall shows a normal uptake.

8. Incomplete study with partial viability over the anterior wall.

## 2017-04-22 NOTE — PDOC
PULMONARY PROGRESS NOTES


Subjective


feels better


s/p LAD stent


Vitals





 Vital Signs








  Date Time  Temp Pulse Resp B/P Pulse Ox O2 Delivery O2 Flow Rate FiO2


 


4/22/17 08:14  76  151/63    


 


4/22/17 07:00   20  97 Nasal Cannula 3.0 


 


4/22/17 03:55 99.0       





 99.0       








General:  Alert, No acute distress


Lungs:  Clear


Cardiovascular:  S1


Abdomen:  Soft, Other (obese)


Neuro Exam:  Alert


Extremities:  Other (trace edema)


Skin:  Warm


Labs





Laboratory Tests








Test


  4/20/17


11:51 4/20/17


13:17 4/20/17


16:40 4/20/17


20:34


 


Glucose (Fingerstick)


  118mg/dL


(70-99) 193mg/dL


(70-99) 224mg/dL


(70-99) 143mg/dL


(70-99)














Test


  4/21/17


07:58 4/21/17


13:34 4/21/17


14:30 4/21/17


17:53


 


Glucose (Fingerstick)


  174mg/dL


(70-99) 120mg/dL


(70-99) 


  168mg/dL


(70-99)


 


Activated Clotting Time   SEC ()  














Test


  4/21/17


20:17 4/22/17


08:12 


  


 


 


Glucose (Fingerstick)


  164mg/dL


(70-99) 138mg/dL


(70-99) 


  


 








Laboratory Tests








Test


  4/21/17


13:34 4/21/17


14:30 4/21/17


17:53 4/21/17


20:17


 


Glucose (Fingerstick)


  120mg/dL


(70-99) 


  168mg/dL


(70-99) 164mg/dL


(70-99)


 


Activated Clotting Time  SEC ()   














Test


  4/22/17


08:12 


  


  


 


 


Glucose (Fingerstick)


  138mg/dL


(70-99) 


  


  


 








Medications





Active Scripts








 Medications  Dose


 Route/Sig Days Date Category


 


 Stiolto Respimat


 Inhal Spray


  (Tiotropium


 Br/Olodaterol


 HCl) 4 Gm


 Mist.inhal  4 Gm


 IH DAILY   3/14/17 Reported


 


 Novolog Flexpen


  (Insulin Aspart)


 100 Unit/1 Ml


 Insuln.pen  1 Unit


 SQ   3/14/17 Reported


 


 Ventolin Hfa


 Inhaler


  (Albuterol


 Sulfate) 18 Gm


 Hfa.aer.ad  2 Puff


 INH QID   3/14/17 Reported


 


 Flovent 110MCG


 Hfa (Fluticasone


 Propionate) 12 Gm


 Aer.w.adap  2 Puff


 IH BID   3/14/17 Reported


 


 Losartan


 Potassium 25 Mg


 Tablet  100 Mg


 PO DAILY   3/14/17 Reported


 


 Levemir (Insulin


 Detemir) 100


 Unit/1 Ml Vial  70 Unit


 SQ QHS   3/14/17 Reported











Impression


.


1.  Acute-on-chronic hypoxemic respiratory failure secondary to acute-on-chronic


systolic heart failure.


2.  CAD, s/p cath, MV CAD, s/p stent LAD


3.   hypertension.


4.  Elevated troponin./NSTMI


5.  Type 2 diabetes.


6.  Leukocytosis, suspect reactive.


7.  Elevated liver chemistries suspect hepatic congestion. resolved


8.  COPD,moderate FEV1 1.3(41%) Predicted


9.  Multivessel CAD


10. 2.2 cm mass with eccentric calcification, ? malignancy vs hemartoma





Plan


.





1.  s/p cath .Stent LAD. MV CAD, not the best optimum candidate for surgery . d/

w Dr Oconnell


2.  CT Chest with 2.2 cm left lung mass with eccentric calcification, could be 

Hamartoma, cannot exclude Neoplasm. would benefit from OP PET, since CABG is 

cancelled , Cardiology to consider PCI. would recommend stabilizing cardiac 

status first before persuing with any invasive biopsy such as Bronch or ct 

guided lung biopsy. would recommend OP PET first. If Hypermetabolic mass,then 

have to wait 3-4 weeks before invasive biopsy and stabilization of cardiac 

status (may need at least 4-6 week on anti-platelet therapy )


3.  Continue oxygen supplementation.


4.  No need for antibiotics or steroids.


5.  d/w Dr Eastman and Dr Oconnell


6.  Pt on home oxygen at 4 litres.








ENOCH FAN MD Apr 22, 2017 09:55

## 2017-04-23 VITALS
SYSTOLIC BLOOD PRESSURE: 110 MMHG | SYSTOLIC BLOOD PRESSURE: 110 MMHG | DIASTOLIC BLOOD PRESSURE: 39 MMHG | DIASTOLIC BLOOD PRESSURE: 39 MMHG

## 2017-04-23 VITALS — SYSTOLIC BLOOD PRESSURE: 110 MMHG | DIASTOLIC BLOOD PRESSURE: 39 MMHG

## 2017-04-23 VITALS — SYSTOLIC BLOOD PRESSURE: 119 MMHG | DIASTOLIC BLOOD PRESSURE: 66 MMHG

## 2017-04-23 LAB
CHOLEST SERPL-MCNC: 154 MG/DL (ref 0–200)
CHOLEST/HDLC SERPL: 5.1 {RATIO}
HDLC SERPL-MCNC: 30 MG/DL (ref 40–60)
NONHDLC SERPL-MCNC: 124 MG/DL (ref 0–129)
TRIGL SERPL-MCNC: 139 MG/DL (ref 0–150)

## 2017-04-23 RX ADMIN — CLOPIDOGREL BISULFATE SCH MG: 75 TABLET ORAL at 08:28

## 2017-04-23 RX ADMIN — INSULIN ASPART SCH UNITS: 100 INJECTION, SOLUTION INTRAVENOUS; SUBCUTANEOUS at 08:00

## 2017-04-23 RX ADMIN — INSULIN ASPART SCH UNITS: 100 INJECTION, SOLUTION INTRAVENOUS; SUBCUTANEOUS at 12:34

## 2017-04-23 RX ADMIN — INSULIN ASPART SCH UNITS: 100 INJECTION, SOLUTION INTRAVENOUS; SUBCUTANEOUS at 12:00

## 2017-04-23 RX ADMIN — LOSARTAN POTASSIUM SCH MG: 50 TABLET ORAL at 08:28

## 2017-04-23 RX ADMIN — FUROSEMIDE SCH MG: 20 TABLET ORAL at 08:27

## 2017-04-23 RX ADMIN — FLUTICASONE PROPIONATE SCH SPRAY: 50 SPRAY, METERED NASAL at 08:33

## 2017-04-23 RX ADMIN — BACITRACIN ZINC, NEOMYCIN, POLYMYXIN B SCH PKT: 400; 3.5; 5 OINTMENT TOPICAL at 09:33

## 2017-04-23 RX ADMIN — ASPIRIN 325 MG ORAL TABLET SCH MG: 325 PILL ORAL at 08:28

## 2017-04-23 RX ADMIN — ENOXAPARIN SODIUM SCH MG: 80 INJECTION SUBCUTANEOUS at 05:54

## 2017-04-23 RX ADMIN — Medication PRN EACH: at 11:59

## 2017-04-23 RX ADMIN — Medication SCH EA: at 08:28

## 2017-04-23 RX ADMIN — INSULIN ASPART SCH UNITS: 100 INJECTION, SOLUTION INTRAVENOUS; SUBCUTANEOUS at 08:32

## 2017-04-23 RX ADMIN — IPRATROPIUM BROMIDE AND ALBUTEROL SULFATE SCH ML: .5; 3 SOLUTION RESPIRATORY (INHALATION) at 11:32

## 2017-04-23 RX ADMIN — IPRATROPIUM BROMIDE AND ALBUTEROL SULFATE SCH ML: .5; 3 SOLUTION RESPIRATORY (INHALATION) at 07:10

## 2017-04-23 NOTE — PDOC
PROGRESS NOTES


Subjective


Subjective


Patient without complaint, feels ready to go home.





Objective


Objective





 Vital Signs








  Date Time  Temp Pulse Resp B/P Pulse Ox O2 Delivery O2 Flow Rate FiO2


 


4/23/17 11:32     98 Nasal Cannula 3.0 


 


4/23/17 08:28  48  110/39    


 


4/23/17 08:24 97.8  18     





 97.8       














 Intake and Output 


 


 4/23/17





 07:00


 


Intake Total 950 ml


 


Output Total 1900 ml


 


Balance -950 ml


 


 


 


Intake Oral 950 ml


 


Output Urine Total 1900 ml


 


# Bowel Movements 1











Physical Exam


Abdomen:  Normal bowel sounds, Soft, No tenderness


Heart:  Regular rate


Extremities:  No edema


General:  Alert, Oriented X3, No acute distress


Lungs:  Clear to auscultation





Assessment


Assessment


 Problems


Medical Problems:


(1) Acute on chronic diastolic CHF (congestive heart failure)


Status: Acute  





(2) Acute on chronic respiratory failure


Status: Acute  





(3) Cardiac ischemia


Status: Acute  





(4) COPD exacerbation


Status: Acute  











Plan


Plan of Care


1. CAD, s/p angioplasty and stent placement x2 - stable. Home today on Plavix 

and ASA. Follow up with Dr Eastman.


2. Acute on chronic respiratory failure with COPD - much improved, back at 

baseline. Continue nebs and O2. Follow up with Dr Thorpe for PET scan to evaluate 

lung mass found on CT.


3. DM2 - well controlled, continue his usual insulins.


4. HTN - stable, home on his usual Losartan.


5. CHF - mild, continue low dose Lasix. K+ OK on lab yesterday.





Comment


Review of Relevant


I have reviewed the following items elfego (where applicable) has been applied.


Labs





Laboratory Tests








Test


  4/21/17


13:34 4/21/17


14:30 4/21/17


17:53 4/21/17


20:17


 


Glucose (Fingerstick)


  120mg/dL


(70-99) 


  168mg/dL


(70-99) 164mg/dL


(70-99)


 


Activated Clotting Time  SEC ()   














Test


  4/22/17


08:12 4/22/17


11:15 4/22/17


12:50 4/22/17


17:30


 


Glucose (Fingerstick)


  138mg/dL


(70-99) 


  123mg/dL


(70-99) 136mg/dL


(70-99)


 


White Blood Count


  


  7.5x10^3/uL


(4.0-11.0) 


  


 


 


Red Blood Count


  


  4.82x10^6/uL


(4.30-5.70) 


  


 


 


Hemoglobin


  


  13.1g/dL


(13.0-17.5) 


  


 


 


Hematocrit


  


  38.6%


(39.0-53.0) 


  


 


 


Mean Corpuscular Volume  80fL ()   


 


Mean Corpuscular Hemoglobin  27pg (25-35)   


 


Mean Corpuscular Hemoglobin


Concent 


  34g/dL (31-37) 


  


  


 


 


Red Cell Distribution Width


  


  15.5%


(11.5-14.5) 


  


 


 


Platelet Count


  


  198x10^3/uL


(140-400) 


  


 


 


Sodium Level


  


  139mmol/L


(136-145) 


  


 


 


Potassium Level


  


  4.5mmol/L


(3.5-5.1) 


  


 


 


Chloride Level


  


  100mmol/L


() 


  


 


 


Carbon Dioxide Level


  


  35mmol/L


(21-32) 


  


 


 


Anion Gap  4 (6-14)   


 


Blood Urea Nitrogen  21mg/dL (8-26)   


 


Creatinine


  


  1.2mg/dL


(0.7-1.3) 


  


 


 


Estimated GFR


(Cockcroft-Gault) 


  60.6 


  


  


 


 


Glucose Level


  


  157mg/dL


(70-99) 


  


 


 


Calcium Level


  


  8.8mg/dL


(8.5-10.1) 


  


 














Test


  4/22/17


20:46 4/23/17


07:31 


  


 


 


Glucose (Fingerstick)


  126mg/dL


(70-99) 112mg/dL


(70-99) 


  


 








Laboratory Tests








Test


  4/22/17


12:50 4/22/17


17:30 4/22/17


20:46 4/23/17


07:31


 


Glucose (Fingerstick)


  123mg/dL


(70-99) 136mg/dL


(70-99) 126mg/dL


(70-99) 112mg/dL


(70-99)








Microbiology


4/17/17 Blood Culture - Final, Complete


          NO GROWTH AFTER 5 DAYS


Medications





 Current Medications


Nitroglycerin/ Dextrose (Nitroglycerin Drip) 250 ml @ 0 mls/hr CONT  PRN IV SEE 

I/O RECORD Last administered on 4/17/17at 05:15;  Start 4/17/17 at 05:15


Albuterol/ Ipratropium (Duoneb) 6 ml 1X  ONCE NEB  Last administered on 4/17/ 17at 05:17;  Start 4/17/17 at 05:30;  Stop 4/17/17 at 05:31;  Status DC


Furosemide (Lasix) 60 mg 1X  ONCE IVP  Last administered on 4/17/17at 06:21;  

Start 4/17/17 at 06:30;  Stop 4/17/17 at 06:31;  Status DC


Ondansetron HCl 4 mg 4 mg PRN Q8HRS  PRN IV NAUSEA/VOMITING;  Start 4/17/17 at 

06:15;  Stop 4/18/17 at 06:14;  Status DC


Sodium Chloride (Iv Sodium Chloride 0.9% 1000ml Bag) 1,000 ml @  30 mls/hr Q24H 

IV  Last administered on 4/17/17at 06:21;  Start 4/17/17 at 06:30;  Stop 4/18/ 17 at 06:29;  Status DC


Acetaminophen (Tylenol) 650 mg PRN Q4HRS  PRN PO FEVER Last administered on 4/17 /17at 20:35;  Start 4/17/17 at 06:15;  Stop 4/18/17 at 06:14;  Status DC


Albuterol/ Ipratropium (Duoneb) 3 ml RTQID NEB  Last administered on 4/18/17at 

07:39;  Start 4/17/17 at 08:00;  Stop 4/18/17 at 07:59;  Status DC


Fluticasone Propionate (Flonase) 2 spray DAILY NS  Last administered on 4/18/ 17at 09:53;  Start 4/18/17 at 09:00


Non-Formulary Medication 4 gm DAILY IH ;  Start 4/18/17 at 09:00;  Status UNV


Insulin Aspart (Novolog) 25 units TIDAC SQ ;  Start 4/17/17 at 11:30;  Stop 4/17 /17 at 11:55;  Status DC


Losartan Potassium (Cozaar) 25 mg DAILY PO ;  Start 4/18/17 at 09:00;  Stop 4/18 /17 at 09:00;  Status DC


Albuterol Sulfate (Ventolin Neb Soln) 2.5 mg PRN Q4HRS  PRN NEB SHORTNESS OF 

BREATH;  Start 4/17/17 at 09:15


Insulin Detemir (Levemir) 70 units QHS SQ  Last administered on 4/22/17at 20:49

;  Start 4/17/17 at 21:00


Losartan Potassium (Cozaar) 25 mg DAILY PO  Last administered on 4/17/17at 10:04

;  Start 4/17/17 at 09:15;  Stop 4/18/17 at 07:42;  Status DC


Furosemide (Lasix) 20 mg DAILY PO  Last administered on 4/23/17at 08:27;  Start 

4/17/17 at 09:15


Insulin Aspart (Novolog) 0-7 UNITS TIDWMEALS SQ  Last administered on 4/18/17at 

13:14;  Start 4/17/17 at 12:00


Dextrose (Dextrose 50%-Water Syringe) 12.5 gm PRN Q15MIN  PRN IV SEE COMMENTS;  

Start 4/17/17 at 09:15


Insulin Aspart (Novolog) 15 units TIDAC SQ  Last administered on 4/18/17at 13:13

;  Start 4/17/17 at 12:00;  Stop 4/18/17 at 14:29;  Status DC


Furosemide (Lasix) 80 mg Q12HR PO  Last administered on 4/18/17at 21:13;  Start 

4/17/17 at 21:00;  Stop 4/19/17 at 00:00;  Status DC


Furosemide (Lasix) 40 mg 1X  ONCE PO  Last administered on 4/17/17at 13:13;  

Start 4/17/17 at 12:45;  Stop 4/17/17 at 12:50;  Status DC


Furosemide (Lasix) 20 mg 1X  ONCE PO ;  Start 4/17/17 at 12:45;  Stop 4/17/17 

at 12:46;  Status Cancel


Enoxaparin Sodium (Lovenox 80mg Syringe) 80 mg Q12H SQ  Last administered on 4/ 23/17at 05:54;  Start 4/17/17 at 17:00


Cyclobenzaprine HCl (Flexeril) 5 mg PRN Q6HRS  PRN PO MUSCLE SPASMS;  Start 4/18 /17 at 07:45


Losartan Potassium (Cozaar) 50 mg DAILY PO  Last administered on 4/23/17at 08:28

;  Start 4/18/17 at 09:00


Albuterol/ Ipratropium (Duoneb) 3 ml RTQID NEB  Last administered on 4/23/17at 

11:32;  Start 4/18/17 at 12:00


Insulin Aspart (Novolog) 30 units TIDAC SQ  Last administered on 4/23/17at 08:32

;  Start 4/18/17 at 16:30


Acetylcysteine (Mucomyst 20% Oral Solution) 600 mg Q8H PO  Last administered on 

4/19/17at 06:50;  Start 4/18/17 at 22:00;  Stop 4/19/17 at 06:01;  Status DC


Non-Formulary Medication 1 ea BID INH  Last administered on 4/23/17at 08:28;  

Start 4/18/17 at 21:00


Acetaminophen 650 mg 650 mg PRN Q6HRS  PRN PO HEADACHE Last administered on 4/18 /17at 19:50;  Start 4/18/17 at 19:45


Heparin Sodium/ Sodium Chloride 1,000 ml @  As Directed STK-MED ONCE .ROUTE ;  

Start 4/19/17 at 08:52;  Stop 4/19/17 at 08:53;  Status DC


Lidocaine HCl 20 ml STK-MED ONCE .ROUTE ;  Start 4/19/17 at 08:52;  Stop 4/19/ 17 at 08:53;  Status DC


Iodixanol (Visipaque 320) 100 ml STK-MED ONCE .ROUTE ;  Start 4/19/17 at 08:52;

  Stop 4/19/17 at 08:53;  Status DC


Fentanyl Citrate (Fentanyl 5ml Vial) 250 mcg STK-MED ONCE .ROUTE ;  Start 4/19/ 17 at 11:18;  Stop 4/19/17 at 11:19;  Status DC


Midazolam HCl (Versed) 5 mg STK-MED ONCE .ROUTE ;  Start 4/19/17 at 11:18;  

Stop 4/19/17 at 11:19;  Status DC


Heparin Sodium/ Sodium Chloride 1,000 unit 1X  ONCE IART  Last administered on 4 /19/17at 12:16;  Start 4/19/17 at 11:30;  Stop 4/19/17 at 11:31;  Status DC


Midazolam HCl (Versed) 5 mg 1X  ONCE IV  Last administered on 4/19/17at 12:17;  

Start 4/19/17 at 11:30;  Stop 4/19/17 at 11:31;  Status DC


Fentanyl Citrate (Fentanyl 5ml Vial) 250 mcg 1X  ONCE IV  Last administered on 4 /19/17at 12:17;  Start 4/19/17 at 11:30;  Stop 4/19/17 at 11:31;  Status DC


Iodixanol (Visipaque 320) 100 ml 1X  ONCE IART  Last administered on 4/19/17at 

12:16;  Start 4/19/17 at 11:30;  Stop 4/19/17 at 11:31;  Status DC


Lidocaine HCl 20 ml 1X  ONCE IJ  Last administered on 4/19/17at 12:16;  Start 4/ 19/17 at 11:30;  Stop 4/19/17 at 11:31;  Status DC


Potassium Chloride (Klor-Con) 40 meq 1X  ONCE PO  Last administered on 4/20/ 17at 11:52;  Start 4/20/17 at 08:30;  Stop 4/20/17 at 08:31;  Status DC


Neomycin/ Polymyxin/ Bacitracin (Triple Antibiotic Ointment) 1 pkt BID TP  Last 

administered on 4/23/17at 09:33;  Start 4/20/17 at 09:00


Nitroglycerin (Nitrostat) 0.4 mg STK-MED ONCE SL ;  Start 4/20/17 at 13:09;  

Stop 4/20/17 at 13:10;  Status DC


Nitroglycerin (Nitrostat) 0.4 mg PRN Q5MIN  PRN SL CHEST PAIN Last administered 

on 4/20/17at 14:57;  Start 4/20/17 at 13:15


Diltiazem HCl 10 mg 10 mg 1X  ONCE IVP ;  Start 4/20/17 at 13:45;  Stop 4/20/17 

at 13:46;  Status DC


Diltiazem HCl 125 mg/Dextrose 125 ml @ 0 mls/hr CONT  PRN IV SEE I/O RECORD;  

Start 4/20/17 at 13:30


Sodium Chloride (Iv Sodium Chloride 0.45%) 1,000 ml @  60 mls/hr [id number] IV  

Last administered on 4/21/17at 08:34;  Start 4/21/17 at 08:00;  Stop 4/21/17 at 

21:33;  Status DC


Sennosides (Senna) 17.2 mg PRN BID  PRN PO CONSTIPATION, 2ND CHOICE Last 

administered on 4/21/17at 08:32;  Start 4/21/17 at 08:00


Polyethylene Glycol (miraLAX PACKET) 17 gm PRN DAILY  PRN PO CONSTIPATION, 1ST 

CHOICE;  Start 4/21/17 at 08:00


Nitroglycerin 0.4 mg 0.4 mg STK-MED ONCE SL ;  Start 4/20/17 at 13:00;  Stop 4/ 21/17 at 08:33;  Status DC


Heparin Sodium/ Sodium Chloride 1,000 ml @  As Directed STK-MED ONCE .ROUTE ;  

Start 4/21/17 at 10:08;  Stop 4/21/17 at 10:09;  Status DC


Lidocaine HCl 20 ml STK-MED ONCE .ROUTE ;  Start 4/21/17 at 10:09;  Stop 4/21/ 17 at 10:10;  Status DC


Iodixanol (Visipaque 320) 100 ml STK-MED ONCE .ROUTE ;  Start 4/21/17 at 10:09;

  Stop 4/21/17 at 10:10;  Status DC


Heparin Sodium (Porcine) (Heparin Sodium) 10,000 unit STK-MED ONCE .ROUTE ;  

Start 4/21/17 at 10:09;  Stop 4/21/17 at 10:10;  Status DC


Heparin Sodium (Porcine) (Heparin Sodium) 10,000 unit STK-MED ONCE .ROUTE ;  

Start 4/21/17 at 10:50;  Stop 4/21/17 at 10:51;  Status DC


Fentanyl Citrate (Fentanyl 5ml Vial) 250 mcg STK-MED ONCE .ROUTE ;  Start 4/21/ 17 at 10:51;  Stop 4/21/17 at 10:52;  Status DC


Midazolam HCl (Versed) 5 mg STK-MED ONCE .ROUTE ;  Start 4/21/17 at 10:51;  

Stop 4/21/17 at 10:52;  Status DC


Heparin Sodium/ Sodium Chloride 1,000 unit 1X  ONCE IART  Last administered on 4 /21/17at 11:22;  Start 4/21/17 at 11:00;  Stop 4/21/17 at 11:03;  Status DC


Midazolam HCl (Versed) 5 mg 1X  ONCE IV  Last administered on 4/21/17at 12:03;  

Start 4/21/17 at 11:00;  Stop 4/21/17 at 11:03;  Status DC


Fentanyl Citrate (Fentanyl 5ml Vial) 250 mcg 1X  ONCE IV  Last administered on 4 /21/17at 12:03;  Start 4/21/17 at 11:00;  Stop 4/21/17 at 11:03;  Status DC


Iodixanol (Visipaque 320) 100 ml 1X  ONCE IART  Last administered on 4/21/17at 

12:04;  Start 4/21/17 at 11:00;  Stop 4/21/17 at 11:03;  Status DC


Lidocaine HCl 20 ml 1X  ONCE IJ  Last administered on 4/21/17at 11:22;  Start 4/ 21/17 at 11:00;  Stop 4/21/17 at 11:03;  Status DC


Heparin Sodium (Porcine) 69538 unit 10,000 unit 1X  ONCE IV  Last administered 

on 4/21/17at 12:08;  Start 4/21/17 at 11:15;  Stop 4/21/17 at 11:16;  Status DC


Tirofiban/Sodium Chloride (Aggrastat 5 Mg/ 100 ml Premix) 100 ml @  As Directed 

STK-MED ONCE IV ;  Start 4/21/17 at 11:38;  Stop 4/21/17 at 11:39;  Status DC


Clopidogrel Bisulfate 300 mg 300 mg 1X  ONCE PO  Last administered on 4/21/17at 

12:04;  Start 4/21/17 at 11:45;  Stop 4/21/17 at 11:46;  Status DC


Tirofiban/Sodium Chloride 100 ml @ 0 mls/hr 1X  ONCE IV  Last administered on 4/ 21/17at 12:04;  Start 4/21/17 at 11:45;  Stop 4/21/17 at 11:46;  Status DC


Heparin Sodium/ Sodium Chloride 500 ml @  As Directed STK-MED ONCE .ROUTE ;  

Start 4/21/17 at 11:58;  Stop 4/21/17 at 11:59;  Status DC


Nitroglycerin (Nitroglycerin) 200 mcg 1X  ONCE IART  Last administered on 4/21/ 17at 12:11;  Start 4/21/17 at 12:15;  Stop 4/21/17 at 12:16;  Status DC


Fentanyl Citrate (Fentanyl 2ml Vial) 100 mcg STK-MED ONCE .ROUTE ;  Start 4/21/ 17 at 14:48;  Stop 4/21/17 at 14:49;  Status DC


Fentanyl Citrate (Fentanyl 2ml Vial) 50 mcg 1X  ONCE IV  Last administered on 4/ 21/17at 15:15;  Start 4/21/17 at 15:15;  Stop 4/21/17 at 15:22;  Status DC


Midazolam HCl (Versed) 1 mg 1X  ONCE IV  Last administered on 4/21/17at 15:15;  

Start 4/21/17 at 15:15;  Stop 4/21/17 at 15:22;  Status DC


Potassium Chloride (Klor-Con) 40 meq 1X  ONCE PO  Last administered on 4/21/ 17at 15:28;  Start 4/21/17 at 15:15;  Stop 4/21/17 at 15:22;  Status DC


Diazepam (Valium) 5 mg PRN Q4HRS  PRN PO ANXIETY Last administered on 4/21/17at 

15:27;  Start 4/21/17 at 15:15


Aspirin (Reggie Aspirin) 325 mg DAILYWBKFT PO ;  Start 4/21/17 at 16:00;  Stop 4/ 21/17 at 18:02;  Status DC


Clopidogrel Bisulfate (Plavix) 75 mg DAILYWBKFT PO ;  Start 4/21/17 at 16:00;  

Stop 4/21/17 at 18:03;  Status DC


Aspirin (Reggie Aspirin) 325 mg DAILYWBKFT PO  Last administered on 4/23/17at 08:

28;  Start 4/22/17 at 09:00


Clopidogrel Bisulfate (Plavix) 75 mg DAILYWBKFT PO  Last administered on 4/23/ 17at 08:28;  Start 4/22/17 at 09:00


Info (Anti-Coagulation Monitoring By Pharmacy) 1 each PRN DAILY  PRN MC SEE 

COMMENTS Last administered on 4/22/17at 13:23;  Start 4/22/17 at 13:15





Active Scripts


Active


Reported


Stiolto Respimat Inhal Spray (Tiotropium Br/Olodaterol HCl) 4 Gm Mist.inhal 4 

Gm IH DAILY


Novolog Flexpen (Insulin Aspart) 100 Unit/1 Ml Insuln.pen 1 Unit SQ 


Ventolin Hfa Inhaler (Albuterol Sulfate) 18 Gm Hfa.aer.ad 2 Puff INH QID


Flovent 110MCG Hfa (Fluticasone Propionate) 12 Gm Aer.w.adap 2 Puff IH BID


Losartan Potassium 25 Mg Tablet 100 Mg PO DAILY


Levemir (Insulin Detemir) 100 Unit/1 Ml Vial 70 Unit SQ QHS


Vitals/I & O





 Vital Sign - Last 24 Hours








 4/22/17 4/22/17 4/22/17 4/22/17





 12:00 16:07 17:19 19:30


 


Temp 98.4  98.2 





 98.4  98.2 


 


Pulse 68  69 


 


Resp 20  22 


 


B/P 120/62  140/51 


 


Pulse Ox 98 95 98 


 


O2 Delivery Nasal Cannula Nasal Cannula Nasal Cannula Nasal Cannula


 


O2 Flow Rate 3.0 3.0 3.0 3.0


 


    





    





 4/22/17 4/22/17 4/22/17 4/23/17





 19:30 20:59 23:42 03:15


 


Temp 98.5  98.7 98.5





 98.5  98.7 98.5


 


Pulse 50  53 57


 


Resp 18  18 18


 


B/P 114/76  133/62 119/66


 


Pulse Ox 98 97 98 99


 


O2 Delivery Nasal Cannula Nasal Cannula Nasal Cannula Nasal Cannula


 


O2 Flow Rate 3.0 3.0 3.0 3.0


 


    





    





 4/23/17 4/23/17 4/23/17 4/23/17





 07:11 07:45 08:24 08:28


 


Temp   97.8 





   97.8 


 


Pulse   48 48


 


Resp   18 


 


B/P   110/39 110/39


 


Pulse Ox 98  97 


 


O2 Delivery Nasal Cannula Nasal Cannula Nasal Cannula 


 


O2 Flow Rate 3.0 3.0 3.0 


 


    





    





 4/23/17   





 11:32   


 


Pulse Ox 98   


 


O2 Delivery Nasal Cannula   


 


O2 Flow Rate 3.0   














 Intake and Output   


 


 4/22/17 4/22/17 4/23/17





 15:00 23:00 07:00


 


Intake Total  350 ml 600 ml


 


Output Total 250 ml 1150 ml 500 ml


 


Balance -250 ml -800 ml 100 ml














HARRY GRIFFITHS MD Apr 23, 2017 11:49

## 2017-04-23 NOTE — PDOC
Provider Note


Provider Note


Covering for Dr. Eastman.


He is being discharged by the admitting physician today.


Will need to be on aspirin and Plavix for at least 4-6 weeks before it could be 

discontinued for a possible biopsy.


Lipid profile is to be done on the blood drawn in the morning of 4/22/17..


Discharge on atorvastatin 20 mg a day as per the guidelines.


He has had sinus arrhythmia at night with some episodes of bradycardia and 

prolonged R-R interval.


He also has COPD. There is thus a relative contraindication to beta blockers.


Follow-up with Dr. Eastman.








HEBER JOHNS MD Apr 23, 2017 14:31

## 2017-04-23 NOTE — PDOC
PULMONARY PROGRESS NOTES


Subjective


feels better


s/p LAD stent


Vitals





 Vital Signs








  Date Time  Temp Pulse Resp B/P Pulse Ox O2 Delivery O2 Flow Rate FiO2


 


4/23/17 08:28  48  110/39    


 


4/23/17 08:24 97.8  18  97 Nasal Cannula 3.0 





 97.8       








General:  Alert, No acute distress


Lungs:  Clear


Cardiovascular:  S1


Abdomen:  Soft, Other (obese)


Neuro Exam:  Alert


Extremities:  Other (trace edema)


Skin:  Warm


Labs





Laboratory Tests








Test


  4/21/17


13:34 4/21/17


14:30 4/21/17


17:53 4/21/17


20:17


 


Glucose (Fingerstick)


  120mg/dL


(70-99) 


  168mg/dL


(70-99) 164mg/dL


(70-99)


 


Activated Clotting Time  SEC ()   














Test


  4/22/17


08:12 4/22/17


11:15 4/22/17


12:50 4/22/17


17:30


 


Glucose (Fingerstick)


  138mg/dL


(70-99) 


  123mg/dL


(70-99) 136mg/dL


(70-99)


 


White Blood Count


  


  7.5x10^3/uL


(4.0-11.0) 


  


 


 


Red Blood Count


  


  4.82x10^6/uL


(4.30-5.70) 


  


 


 


Hemoglobin


  


  13.1g/dL


(13.0-17.5) 


  


 


 


Hematocrit


  


  38.6%


(39.0-53.0) 


  


 


 


Mean Corpuscular Volume  80fL ()   


 


Mean Corpuscular Hemoglobin  27pg (25-35)   


 


Mean Corpuscular Hemoglobin


Concent 


  34g/dL (31-37) 


  


  


 


 


Red Cell Distribution Width


  


  15.5%


(11.5-14.5) 


  


 


 


Platelet Count


  


  198x10^3/uL


(140-400) 


  


 


 


Sodium Level


  


  139mmol/L


(136-145) 


  


 


 


Potassium Level


  


  4.5mmol/L


(3.5-5.1) 


  


 


 


Chloride Level


  


  100mmol/L


() 


  


 


 


Carbon Dioxide Level


  


  35mmol/L


(21-32) 


  


 


 


Anion Gap  4 (6-14)   


 


Blood Urea Nitrogen  21mg/dL (8-26)   


 


Creatinine


  


  1.2mg/dL


(0.7-1.3) 


  


 


 


Estimated GFR


(Cockcroft-Gault) 


  60.6 


  


  


 


 


Glucose Level


  


  157mg/dL


(70-99) 


  


 


 


Calcium Level


  


  8.8mg/dL


(8.5-10.1) 


  


 














Test


  4/22/17


20:46 4/23/17


07:31 


  


 


 


Glucose (Fingerstick)


  126mg/dL


(70-99) 112mg/dL


(70-99) 


  


 








Laboratory Tests








Test


  4/22/17


11:15 4/22/17


12:50 4/22/17


17:30 4/22/17


20:46


 


White Blood Count


  7.5x10^3/uL


(4.0-11.0) 


  


  


 


 


Red Blood Count


  4.82x10^6/uL


(4.30-5.70) 


  


  


 


 


Hemoglobin


  13.1g/dL


(13.0-17.5) 


  


  


 


 


Hematocrit


  38.6%


(39.0-53.0) 


  


  


 


 


Mean Corpuscular Volume 80fL ()    


 


Mean Corpuscular Hemoglobin 27pg (25-35)    


 


Mean Corpuscular Hemoglobin


Concent 34g/dL (31-37) 


  


  


  


 


 


Red Cell Distribution Width


  15.5%


(11.5-14.5) 


  


  


 


 


Platelet Count


  198x10^3/uL


(140-400) 


  


  


 


 


Sodium Level


  139mmol/L


(136-145) 


  


  


 


 


Potassium Level


  4.5mmol/L


(3.5-5.1) 


  


  


 


 


Chloride Level


  100mmol/L


() 


  


  


 


 


Carbon Dioxide Level


  35mmol/L


(21-32) 


  


  


 


 


Anion Gap 4 (6-14)    


 


Blood Urea Nitrogen 21mg/dL (8-26)    


 


Creatinine


  1.2mg/dL


(0.7-1.3) 


  


  


 


 


Estimated GFR


(Cockcroft-Gault) 60.6 


  


  


  


 


 


Glucose Level


  157mg/dL


(70-99) 


  


  


 


 


Calcium Level


  8.8mg/dL


(8.5-10.1) 


  


  


 


 


Glucose (Fingerstick)


  


  123mg/dL


(70-99) 136mg/dL


(70-99) 126mg/dL


(70-99)














Test


  4/23/17


07:31 


  


  


 


 


Glucose (Fingerstick)


  112mg/dL


(70-99) 


  


  


 








Medications





Active Scripts








 Medications  Dose


 Route/Sig Days Date Category


 


 Stiolto Respimat


 Inhal Spray


  (Tiotropium


 Br/Olodaterol


 HCl) 4 Gm


 Mist.inhal  4 Gm


 IH DAILY   3/14/17 Reported


 


 Novolog Flexpen


  (Insulin Aspart)


 100 Unit/1 Ml


 Insuln.pen  1 Unit


 SQ   3/14/17 Reported


 


 Ventolin Hfa


 Inhaler


  (Albuterol


 Sulfate) 18 Gm


 Hfa.aer.ad  2 Puff


 INH QID   3/14/17 Reported


 


 Flovent 110MCG


 Hfa (Fluticasone


 Propionate) 12 Gm


 Aer.w.adap  2 Puff


 IH BID   3/14/17 Reported


 


 Losartan


 Potassium 25 Mg


 Tablet  100 Mg


 PO DAILY   3/14/17 Reported


 


 Levemir (Insulin


 Detemir) 100


 Unit/1 Ml Vial  70 Unit


 SQ QHS   3/14/17 Reported











Impression


.


1.  Acute-on-chronic hypoxemic respiratory failure secondary to acute-on-chronic


systolic heart failure.


2.  CAD, s/p cath, MV CAD, s/p stent LAD


3.   hypertension.


4.  Elevated troponin./NSTMI


5.  Type 2 diabetes.


6.  Leukocytosis, suspect reactive.


7.  Elevated liver chemistries suspect hepatic congestion. resolved


8.  COPD,moderate FEV1 1.3(41%) Predicted


9.  Multivessel CAD


10. 2.2 cm mass with eccentric calcification, ? malignancy vs hemartoma





Plan


.





1.  s/p cath .Stent LAD. MV CAD, not the best optimum candidate for surgery . d/

w Dr Oconnell


2.  CT Chest with 2.2 cm left lung mass with eccentric calcification, could be 

Hamartoma, cannot exclude Neoplasm. would benefit from OP PET, since CABG is 

cancelled , Cardiology to consider PCI. would recommend stabilizing cardiac 

status first before persuing with any invasive biopsy such as Bronch or ct 

guided lung biopsy. would recommend OP PET first. If Hypermetabolic mass,then 

have to wait 3-4 weeks before invasive biopsy and stabilization of cardiac 

status (may need at least 4-6 week on anti-platelet therapy )


3.  Continue oxygen supplementation.


4.  No need for antibiotics or steroids.


5.  d/w Dr Eastman and Dr Oconnlel


6.  Pt on home oxygen at 4 litres. 


7.  ok with dc home today. f/u with me on May 10, PET before








ENOCH FAN MD Apr 23, 2017 09:49

## 2017-04-23 NOTE — DS
DATE OF DISCHARGE:  04/23/2017



CHIEF COMPLAINT:  Shortness of breath.



HISTORY OF PRESENT ILLNESS:  The patient is a 66-year-old male with a history of

COPD who presented to the Emergency Room with the above complaint.  He

apparently had the onset of symptoms several days prior to admission and they

had gradually worsened.  He was already on oxygen at home and he used his

nebulized breathing treatments, but he was feeling increasingly short of breath.

 Initial evaluation in the Emergency Room showed the patient to be in mild

respiratory distress.  He was also felt to be experiencing an exacerbation of

congestive heart failure.  Treatment was started and he was admitted for further

treatment.



HOSPITAL COURSE:  The patient was admitted and placed on telemetry.  He was seen

in consultation by Dr. Eastman, Dr. Thorpe, and Dr. Casey.  The patient

initially required BiPAP and a nitroglycerin drip.  He was given extra Lasix and

did have some diuresis with this, which improved his respiratory status and the 
Bipap was discontinued.  He had

atrial fibrillation, but this spontaneously converted and he then remained in

sinus rhythm with frequent PACs for the rest of his hospital stay.  The patient

had an echocardiogram, which showed an ejection fraction of 45%, which was

similar to results from a previous echocardiogram.  The patient's respiratory

status improved as he was diuresed and he is now breathing comfortably on his

usual 3-4 liters of oxygen per nasal cannula.  



The patient had a cardiac

catheterization, which showed significant diffuse coronary artery disease.  He

was considered to be a candidate for a CABG and so Dr. Casey was consulted. 

Workup for this was undertaken, which included a CT of the chest.  This

unfortunately showed a new nodule in the left lower lobe.  Dr. Casey felt

that this would need to be evaluated and treated before the patient could

undergo cardiac bypass surgery.  Therefore, Dr. Eastman performed angioplasty

and the placement of 2 bare-metal stents in the LAD on 04/21/2017 for short-
term treatment of his CAD.  The patient

remained stable after this.  He has had no further chest pain.  Dr. Thorpe

recommends an outpatient PET scan for further evaluation of his lung nodule. 

Biopsy will be undertaken if the PET scan is abnormal.  The patient feels much

better and will be discharged to home today.  His blood pressure is controlled

with his usual Losartan.  His diabetes is very well controlled with his usual

insulins.  His congestive heart failure is presently compensated on a low dose

of Lasix daily.



FINAL DIAGNOSES:

1.  Coronary artery disease.

2.  Systolic congestive heart failure with acute exacerbation.

3.  Acute-on-chronic respiratory failure with COPD.

4.  Diabetes mellitus type 2, insulin-dependent.

5.  Lung nodule, left lower lobe.

6.  Hypertension.



DISCHARGE MEDICATIONS:  Aspirin 325 mg daily, Plavix 75 mg daily, furosemide 20

mg daily, nitroglycerin 0.4 mg sublingual p.r.n., albuterol inhaler and

nebulized treatments p.r.n., Flovent 2 puffs b.i.d., NovoLog insulin, Levemir

insulin 70 units at bedtime, losartan 100 mg daily, Stiolto Respimat 1

inhalation daily.



DISCHARGE INSTRUCTIONS:  Follow up with Dr. Rodriguez within 2 weeks.  Follow up with

Dr. Thorpe and Dr. Eastman as advised.

 



______________________________

HARRY GRIFFITHS MD



DR:  OLEGARIO/luan  JOB#:  [id number] / [id number]

DD:  04/23/2017 12:09  DT:  04/23/2017 13:33

ANNE-MARIE

## 2017-04-27 NOTE — RAD
Bilateral lower extremity vein mapping 



History:  Preoperative CABG.



Comparison:  None.



Technique: Grayscale imaging was performed of the superficial veins of both

lower extremities.



Findings:  



Right greater saphenous vein near the confluence with the common femoral vein

has diameter 5.1 mm. In the proximal thigh, diameter is 5.6 mm. In the mid

thigh, diameter is 4.1 mm. In the distal thigh, diameter is 2.7 mm. At the

knee, diameter is 2.6 mm. In the proximal calf, diameter is 2.6 mm. In the mid

calf, diameter is 2.7 mm. In the distal calf, diameter is 2.7 mm.



Right lesser saphenous vein in the proximal calf has diameter of 3.3 mm. In

the mid calf, diameter is 2.0 mm. In the distal calf, diameter is 2.0 mm.



Left greater saphenous vein near the confluence of the common femoral vein has

diameter of 6.9 mm. In the proximal thigh, diameter is 6.4 mm. In the mid

thigh, diameter is 3.8 mm. In the distal thigh, diameter is 3.6 mm. At the

knee, diameter is 2.2 mm. In the proximal calf, diameter is 2.9 mm. In the mid

calf, diameter is 2.6 mm. In the distal calf, diameter is 2.8 mm.



Left lesser saphenous vein at the proximal thigh has diameter of 3.6 mm. At

the mid calf, diameter is 2.7 mm. At the distal calf, diameter is 2.6 mm.



Bilateral greater and lesser saphenous veins appear patent.



Impression:  

Vein mapping was performed of bilateral greater and lesser saphenous veins.

Diameters are as above.

## 2017-05-04 ENCOUNTER — HOSPITAL ENCOUNTER (OUTPATIENT)
Dept: HOSPITAL 61 - PETSC | Age: 67
Discharge: HOME | End: 2017-05-04
Attending: INTERNAL MEDICINE
Payer: COMMERCIAL

## 2017-05-04 DIAGNOSIS — R91.8: Primary | ICD-10-CM

## 2017-05-04 PROCEDURE — A9552 F18 FDG: HCPCS

## 2017-05-04 PROCEDURE — 78815 PET IMAGE W/CT SKULL-THIGH: CPT

## 2017-05-04 NOTE — RAD
FDG tumor localization scan, PET/CT, 5/4/2017:



History: Lung nodule



Following IV injection of 13.4 mCi of 18 F-FDG, imaging was performed from the

skull base to the proximal thighs. The noncontrast CT component was performed

for attenuation correction and anatomic localization purposes rather than for

primary diagnosis. The patient's blood glucose level at the time of injection

was 95 MG/DL. 



Again noted is a 2.2 cm nodule in the left lower lobe. It does not demonstrate

abnormal FDG uptake. No hypermetabolic pulmonary or mediastinal foci are seen.

Physiologic muscular activity is seen in the chest, neck and at the hips.



Normal GI tract and urinary tract activity is present in the abdomen and

pelvis. No hypermetabolic abdominal or pelvic lesion is seen.



Incidental CT findings include the presence of moderate aortic and coronary

artery calcifications. The prostate gland is at the upper limits of normal in

size.





IMPRESSION:

 The patient's left lower lobe pulmonary nodule does not demonstrate

significant FDG uptake, compatible with a benign etiology such as an old

granuloma. False negative results can occur with low-grade neoplasms such as a

carcinoid tumor.

## 2017-08-11 VITALS — SYSTOLIC BLOOD PRESSURE: 112 MMHG | DIASTOLIC BLOOD PRESSURE: 65 MMHG

## 2017-09-22 ENCOUNTER — HOSPITAL ENCOUNTER (OUTPATIENT)
Dept: HOSPITAL 61 - CT | Age: 67
Discharge: HOME | End: 2017-09-22
Attending: INTERNAL MEDICINE
Payer: COMMERCIAL

## 2017-09-22 DIAGNOSIS — R91.8: Primary | ICD-10-CM

## 2017-09-22 PROCEDURE — 71250 CT THORAX DX C-: CPT

## 2017-09-22 NOTE — RAD
Indication follow-up lung mass.



Axial images through the chest were obtained and are compared to an

examination 5 months earlier. Note is made of a PET/CT examination 5/4/2017.



Known pulmonary nodule in the left lower lobe, with an associated punctate

calcification is unchanged in size measuring 2.2 cm some minimal

pleural-parenchymal scarring is noted in the left lower lobe. An acute finding

in the chest or new pulmonary nodule is not seen. Some mild the seminal

adenopathy is noted appearing similar. Coronary artery calcification is noted.

Imaging through the upper abdomen is unremarkable. A few tiny scattered

calcified granulomas are noted.



IMPRESSION: Stable pulmonary nodule in the left lower lobe. Continued imaging,

establishing stability over a 2 year time frame, is advised to confirm benign

etiology















PQRS Compliance Statement:



One or more of the following individualized dose reduction techniques were

utilized for this examination:

1. Automated exposure control

2. Adjustment of the mA and/or kV according to patient size

3. Use of iterative reconstruction technique

## 2018-03-23 ENCOUNTER — HOSPITAL ENCOUNTER (OUTPATIENT)
Dept: HOSPITAL 61 - CT | Age: 68
Discharge: HOME | End: 2018-03-23
Attending: INTERNAL MEDICINE
Payer: COMMERCIAL

## 2018-03-23 DIAGNOSIS — J43.2: Primary | ICD-10-CM

## 2018-03-23 DIAGNOSIS — I25.10: ICD-10-CM

## 2018-03-23 DIAGNOSIS — I70.0: ICD-10-CM

## 2018-03-23 DIAGNOSIS — M47.894: ICD-10-CM

## 2018-03-23 PROCEDURE — 71250 CT THORAX DX C-: CPT

## 2018-07-25 ENCOUNTER — HOSPITAL ENCOUNTER (EMERGENCY)
Dept: HOSPITAL 61 - ER | Age: 68
LOS: 1 days | Discharge: HOME | End: 2018-07-26
Payer: COMMERCIAL

## 2018-07-25 DIAGNOSIS — J44.9: ICD-10-CM

## 2018-07-25 DIAGNOSIS — R04.0: Primary | ICD-10-CM

## 2018-07-25 DIAGNOSIS — R42: ICD-10-CM

## 2018-07-25 DIAGNOSIS — I48.91: ICD-10-CM

## 2018-07-25 PROCEDURE — 85025 COMPLETE CBC W/AUTO DIFF WBC: CPT

## 2018-07-25 PROCEDURE — 30905 CONTROL OF NOSEBLEED: CPT

## 2018-07-25 PROCEDURE — 96360 HYDRATION IV INFUSION INIT: CPT

## 2018-07-25 PROCEDURE — 99284 EMERGENCY DEPT VISIT MOD MDM: CPT

## 2018-07-25 PROCEDURE — 36415 COLL VENOUS BLD VENIPUNCTURE: CPT

## 2018-07-25 RX ADMIN — PHENYLEPHRINE HYDROCHLORIDE 1 SPRAY: 0.5 SPRAY NASAL at 22:38

## 2018-07-25 RX ADMIN — BACITRACIN 1 MLS/HR: 5000 INJECTION, POWDER, FOR SOLUTION INTRAMUSCULAR at 23:45

## 2018-07-25 RX ADMIN — AMOXICILLIN AND CLAVULANATE POTASSIUM 1 TAB: 875; 125 TABLET, FILM COATED ORAL at 23:25

## 2018-07-26 LAB
ADD MAN DIFF?: NO
ANISOCYTOSIS: (no result)
BASO #: 0.1 X10^3/UL (ref 0–0.2)
BASO %: 1 % (ref 0–3)
EOS #: 0.3 X10^3/UL (ref 0–0.7)
EOS %: 3 % (ref 0–3)
HCG SERPL-ACNC: 10.5 X10^3/UL (ref 4–11)
HEMATOCRIT: 26.1 % (ref 39–53)
HEMOGLOBIN: 7.8 G/DL (ref 13–17.5)
HYPOCHROMIA: (no result)
LYMPH #: 2.2 X10^3/UL (ref 1–4.8)
LYMPH %: 21 % (ref 24–48)
MEAN CORPUSCULAR HEMOGLOBIN: 18 PG (ref 25–35)
MEAN CORPUSCULAR HGB CONC: 30 G/DL (ref 31–37)
MEAN CORPUSCULAR VOLUME: 59 FL (ref 79–100)
MICROCYTOSIS: (no result)
MONO #: 1.4 X10^3/UL (ref 0–1.1)
MONO %: 13 % (ref 0–9)
NEUT #: 6.5 X10^3UL (ref 1.8–7.7)
NEUT %: 62 % (ref 31–73)
OVALOCYTES: (no result)
PLATELET COUNT: 370 X10^3/UL (ref 140–400)
PLT ESTIMATE: ADEQUATE
POLYCHROMASIA: SLIGHT
RED BLOOD COUNT: 4.4 X10^6/UL (ref 4.3–5.7)
RED CELL DISTRIBUTION WIDTH: 21.5 % (ref 11.5–14.5)

## 2021-11-13 ENCOUNTER — HOSPITAL ENCOUNTER (EMERGENCY)
Dept: HOSPITAL 61 - ER | Age: 71
Discharge: ANOTHER HOSPITAL | End: 2021-11-13
Payer: MEDICARE

## 2021-11-13 VITALS — DIASTOLIC BLOOD PRESSURE: 92 MMHG | SYSTOLIC BLOOD PRESSURE: 142 MMHG

## 2021-11-13 VITALS — BODY MASS INDEX: 27.14 KG/M2 | WEIGHT: 168.87 LBS | HEIGHT: 66 IN

## 2021-11-13 DIAGNOSIS — I25.2: ICD-10-CM

## 2021-11-13 DIAGNOSIS — I50.9: ICD-10-CM

## 2021-11-13 DIAGNOSIS — E78.00: ICD-10-CM

## 2021-11-13 DIAGNOSIS — Z87.891: ICD-10-CM

## 2021-11-13 DIAGNOSIS — Z95.5: ICD-10-CM

## 2021-11-13 DIAGNOSIS — I48.91: ICD-10-CM

## 2021-11-13 DIAGNOSIS — J44.9: ICD-10-CM

## 2021-11-13 DIAGNOSIS — I25.10: ICD-10-CM

## 2021-11-13 DIAGNOSIS — I11.0: ICD-10-CM

## 2021-11-13 DIAGNOSIS — I63.9: Primary | ICD-10-CM

## 2021-11-13 LAB
ANION GAP SERPL CALC-SCNC: 7 MMOL/L (ref 6–14)
APTT BLD: 27 SEC (ref 24–38)
BASOPHILS # BLD AUTO: 0.1 X10^3/UL (ref 0–0.2)
BASOPHILS NFR BLD: 1 % (ref 0–3)
BUN SERPL-MCNC: 21 MG/DL (ref 8–26)
CALCIUM SERPL-MCNC: 8.4 MG/DL (ref 8.5–10.1)
CHLORIDE SERPL-SCNC: 100 MMOL/L (ref 98–107)
CO2 SERPL-SCNC: 29 MMOL/L (ref 21–32)
CREAT SERPL-MCNC: 1 MG/DL (ref 0.7–1.3)
EOSINOPHIL NFR BLD: 0.4 X10^3/UL (ref 0–0.7)
EOSINOPHIL NFR BLD: 5 % (ref 0–3)
ERYTHROCYTE [DISTWIDTH] IN BLOOD BY AUTOMATED COUNT: 14.1 % (ref 11.5–14.5)
GFR SERPLBLD BASED ON 1.73 SQ M-ARVRAT: 73.7 ML/MIN
GLUCOSE SERPL-MCNC: 205 MG/DL (ref 70–99)
HCT VFR BLD CALC: 47.6 % (ref 39–53)
HGB BLD-MCNC: 15.9 G/DL (ref 13–17.5)
LYMPHOCYTES # BLD: 1.8 X10^3/UL (ref 1–4.8)
LYMPHOCYTES NFR BLD AUTO: 24 % (ref 24–48)
MCH RBC QN AUTO: 28 PG (ref 25–35)
MCHC RBC AUTO-ENTMCNC: 33 G/DL (ref 31–37)
MCV RBC AUTO: 83 FL (ref 79–100)
MONO #: 0.8 X10^3/UL (ref 0–1.1)
MONOCYTES NFR BLD: 10 % (ref 0–9)
NEUT #: 4.5 X10^3/UL (ref 1.8–7.7)
NEUTROPHILS NFR BLD AUTO: 60 % (ref 31–73)
PLATELET # BLD AUTO: 248 X10^3/UL (ref 140–400)
POTASSIUM SERPL-SCNC: 4.4 MMOL/L (ref 3.5–5.1)
PROTHROMBIN TIME: 13.1 SEC (ref 11.7–14)
RBC # BLD AUTO: 5.77 X10^6/UL (ref 4.3–5.7)
SODIUM SERPL-SCNC: 136 MMOL/L (ref 136–145)
WBC # BLD AUTO: 7.6 X10^3/UL (ref 4–11)

## 2021-11-13 PROCEDURE — 84484 ASSAY OF TROPONIN QUANT: CPT

## 2021-11-13 PROCEDURE — 70450 CT HEAD/BRAIN W/O DYE: CPT

## 2021-11-13 PROCEDURE — 36415 COLL VENOUS BLD VENIPUNCTURE: CPT

## 2021-11-13 PROCEDURE — 70496 CT ANGIOGRAPHY HEAD: CPT

## 2021-11-13 PROCEDURE — 70498 CT ANGIOGRAPHY NECK: CPT

## 2021-11-13 PROCEDURE — 80048 BASIC METABOLIC PNL TOTAL CA: CPT

## 2021-11-13 PROCEDURE — 85730 THROMBOPLASTIN TIME PARTIAL: CPT

## 2021-11-13 PROCEDURE — 85025 COMPLETE CBC W/AUTO DIFF WBC: CPT

## 2021-11-13 PROCEDURE — 71045 X-RAY EXAM CHEST 1 VIEW: CPT

## 2021-11-13 PROCEDURE — 37195 THROMBOLYTIC THERAPY STROKE: CPT

## 2021-11-13 PROCEDURE — 99285 EMERGENCY DEPT VISIT HI MDM: CPT

## 2021-11-13 PROCEDURE — 93005 ELECTROCARDIOGRAM TRACING: CPT

## 2021-11-13 PROCEDURE — 85610 PROTHROMBIN TIME: CPT

## 2021-11-13 NOTE — RAD
EXAMINATION: XR CHEST 1V



CLINICAL HISTORY: Altered level of consciousness



EXAM DATE/TIME: 11/13/2021 1:21 PM



COMPARISON: 8/8/2017





FINDINGS: 



Lines, Tubes, and Devices: None.



Cardiomediastinal Silhouette: Normal heart size. Aortic atherosclerotic calcification.



Lungs and Pleura: Nonspecific mild diffuse interstitial prominence, similar to prior study and likely
 chronic. No evidence of focal airspace consolidation or pleural effusion. 



Bones and Soft Tissues: Degenerative changes in the thoracic spine.

 



IMPRESSION:



No evidence of acute cardiopulmonary abnormality or significant interval change.



Electronically signed by: Adin Crocker DO (11/13/2021 1:49 PM) [id number]

## 2021-11-13 NOTE — RAD
CT HEAD



INDICATION: Code stroke



COMPARISON: None Available.



Exposure: One or more of the following individualized dose reduction techniques were utilized for thi
s examination:  1. Automated exposure control  2. Adjustment of the mA and/or kV according to patient
 size  3. Use of iterative reconstruction technique



TECHNIQUE: 5 mm contiguous axial images were obtained from the skull base to the vertex in both bone 
and soft tissue algorithm.  



FINDINGS: 



There is hyperdensity appearing left middle cerebral artery, question hyperdense MCA sign. Mild bilat
eral periventricular white matter hypodensities likely chronic small vessel ischemic disease. Small h
ypodensity identified in the right frontal lobe likely old infarct.



No evidence of acute intracranial hemorrhage.   No extra-axial fluid collections.



No mass effect or midline shift. Ventricular size is appropriate.  Basal cisterns are patent.



No fractures identified.Gray-white differentiation is preserved.Globes and orbits are within normal l
imits.   Paranasal sinuses and mastoid air cells are clear.   



IMPRESSION:



Hyperdensity appearing left middle cerebral artery, question hyperdense MCA sign. Thrombus is not exc
luded. Recommend CT angiogram.





**********FOR INTERNAL CODING PURPOSES**********



Critical result:



Findings discussed with  ER physician at 11/13/2021 1:33 PM.



RESULT CODE: (C)  



  











Electronically signed by: Jake Ray MD (11/13/2021 1:33 PM) [id number]

## 2021-11-13 NOTE — PHYS DOC
Past Medical History


Past Medical History:  A-Fib, Bronchitis, CAD, CHF, COPD, Diabetes-Type II, High

Cholesterol, Hypertension, MI


Past Surgical History:  Other


Additional Past Surgical Histo:  CARDIAC STENTS


Smoking Status:  Former Smoker


Alcohol Use:  None


Drug Use:  None





General Adult


HPI:


HPI:





Patient is a 71-year-old male that presents today via [facility] 

with right-sided weakness, aphasia, inability to follow commands, and forced ga

ze.  Per EMS patient last known well was 12:40 PM, EMS his Accu-Chek was 161, 

unable to get history from patient due to speech issues.  Spoke to son and he 

spoke to his brother-in-law with whom who called 911 he states around 1245 today

he was speaking to his father-in-law and all of a sudden he got a blank stare on

his face and started not acting right son-in-law then called 911 and patient was

transported to [facility].  Family did state the patient gets 

medications filled at [facility] at [facility] and state they were contacted and states 

that the last prescription they had for Plavix was filled in 2020





Review of Systems:


Review of Systems:


Patient unable to communicate any concerns or complaints at this time due to 

aphasia





Heart Score:


C/O Chest Pain:  N/A


Risk Factors:


Risk Factors:  DM, Current or recent (<one month) smoker, HTN, HLP, family 

history of CAD, obesity.


Risk Scores:


Score 0 - 3:  2.5% MACE over next 6 weeks - Discharge Home


Score 4 - 6:  20.3% MACE over next 6 weeks - Admit for Clinical Observation


Score 7 - 10:  72.7% MACE over next 6 weeks - Early Invasive Strategies





Allergies:


Allergies:





Allergies








Coded Allergies Type Severity Reaction Last Updated Verified


 


  No Known Drug Allergies    10/3/14 No











Physical Exam:


PE:





Constitutional: Well developed, well nourished, no acute distress, non-toxic 

appearance. []


HENT: Normocephalic, atraumatic, bilateral external ears normal, oropharynx 

moist, no oral exudates, nose normal. []


Eyes: PERRLA, EOMI, conjunctiva normal, no discharge. 


Neck: Normal range of motion, no tenderness, supple, no stridor. [] 


Cardiovascular: Cardiac monitor shows atrial fibrillation no murmurs noted, 

peripheral pulses 1+


Lungs & Thorax:  Bilateral breath sounds clear to auscultation, diminished bases


Abdomen: Bowel sounds normal, soft, no tenderness, no masses, no pulsatile 

masses. [] 


Skin: Warm, dry, no erythema, no rash. [] 


Back: No tenderness, no CVA tenderness. [] 


Extremities: Healing wounds noted over left leg shin area no signs and symptoms 

of infection


Neurologic: Patient is somnolent, does not follow commands, aphasia noted, has 

limited movement to no movement on the right side, sensory intact, does have 

neglect on the right as well.


Psychologic: Unable to assess psych at this time due to patient's inability to 

follow commands or speak at this time





Current Patient Data:


Labs:





Laboratory Tests








Test


 11/13/21


13:20


 


White Blood Count 7.6 x10^3/uL 


 


Red Blood Count 5.77 x10^6/uL 


 


Hemoglobin 15.9 g/dL 


 


Hematocrit 47.6 % 


 


Mean Corpuscular Volume 83 fL 


 


Mean Corpuscular Hemoglobin 28 pg 


 


Mean Corpuscular Hemoglobin


Concent 33 g/dL 





 


Red Cell Distribution Width 14.1 % 


 


Platelet Count 248 x10^3/uL 


 


Neutrophils (%) (Auto) 60 % 


 


Lymphocytes (%) (Auto) 24 % 


 


Monocytes (%) (Auto) 10 % 


 


Eosinophils (%) (Auto) 5 % 


 


Basophils (%) (Auto) 1 % 


 


Neutrophils # (Auto) 4.5 x10^3/uL 


 


Lymphocytes # (Auto) 1.8 x10^3/uL 


 


Monocytes # (Auto) 0.8 x10^3/uL 


 


Eosinophils # (Auto) 0.4 x10^3/uL 


 


Basophils # (Auto) 0.1 x10^3/uL 


 


Prothrombin Time 13.1 SEC 


 


Prothromb Time International


Ratio 1.0 





 


Activated Partial


Thromboplast Time 27 SEC 





 


Sodium Level 136 mmol/L 


 


Potassium Level 4.4 mmol/L 


 


Chloride Level 100 mmol/L 


 


Carbon Dioxide Level 29 mmol/L 


 


Anion Gap 7 


 


Blood Urea Nitrogen 21 mg/dL 


 


Creatinine 1.0 mg/dL 


 


Estimated GFR


(Cockcroft-Gault) 73.7 





 


Glucose Level 205 mg/dL 


 


Calcium Level 8.4 mg/dL 


 


Troponin I High Sensitivity 56 ng/L 








Current Medications








 Medications


  (Trade)  Dose


 Ordered  Sig/Chasity


 Route


 PRN Reason  Start Time


 Stop Time Status Last Admin


Dose Admin


 


 Iohexol


  (Omnipaque 300


 Mg/ml)  75 ml  1X  ONCE


 IV


   11/13/21 13:45


 11/13/21 13:46 DC 11/13/21 13:40





 


 Info


  (CONTRAST GIVEN


 -- Rx MONITORING)  1 each  PRN DAILY  PRN


 [facility]


 SEE COMMENTS  11/13/21 13:45


 11/15/21 13:44   














EKG:


EKG:


EKG done at 1342 read at 1350 by Dr. Joshi no STEMI noted to show sinus rhythm 

with PACs []





Radiology/Procedures:


Radiology/Procedures:


REASON: Possible CVA


PROCEDURE: CT CODE STROKE HEAD WO








CT HEAD





INDICATION: Code stroke





COMPARISON: None Available.





Exposure: One or more of the following individualized dose reduction techniques 

were utilized for this examination:  1. Automated exposure control  2. 

Adjustment of the mA and/or kV according to patient size  3. Use of iterative 

reconstruction technique





TECHNIQUE: 5 mm contiguous axial images were obtained from the skull base to the

 vertex in both bone and soft tissue algorithm.  





FINDINGS: 





There is hyperdensity appearing left middle cerebral artery, question hyperdense

 MCA sign. Mild bilateral periventricular white matter hypodensities likely 

chronic small vessel ischemic disease. Small hypodensity identified in the right

 frontal lobe likely old infarct.





No evidence of acute intracranial hemorrhage.   No extra-axial fluid 

collections.





No mass effect or midline shift. Ventricular size is appropriate.  Basal 

cisterns are patent.





No fractures identified.Gray-white differentiation is preserved.Globes and 

orbits are within normal limits.   Paranasal sinuses and mastoid air cells are 

clear.   





IMPRESSION:





Hyperdensity appearing left middle cerebral artery, question hyperdense MCA 

sign. Thrombus is not excluded. Recommend CT angiogram.








**********FOR INTERNAL CODING PURPOSES**********





Critical result:





Findings discussed with  ER physician at 11/13/2021 1:33 PM.





RESULT CODE: (C)  []





REASON: Possible CVA


PROCEDURE: CT ANGIOGRAPHY HEAD AND NECK





Examination: CT angiography head and neck with IV contrast





COMPARISON:CT head same day exam





History: Cerebrovascular accident, code stroke





TECHNIQUE: Axial CT angiographic images of the head and neck were performed with

 IV contrast. Coronal and sagittal 3-D MIP reformats are performed. 3-D 

Volumetric reformats of the carotids and [place] were performed.





Exposure: One or more of the following individualized dose reduction techniques 

were utilized for this examination:  1. Automated exposure control  2. 

Adjustment of the mA and/or kV according to patient size  3. Use of iterative 

reconstruction technique








Stenosis calculations for CT, MR, and conventional angiography are based upon 

measurements of the distal ICA diameter in accordance with the NASCET 

methodology. Stenosis calculations for carotid ultrasound studies are derived 

from validated velocity criteria which are known to correlate with the NASCET 

methodology.





FINDINGS:





The origin of the great vessels from the arch of the aorta grossly appears 

unremarkable. The bilateral common carotid arteries are patent. There is 

complete occlusion/thrombus of the left internal carotid artery at its origin is

 no contrast or flow the left internal carotid artery throughout the neck and 

inthe intracranial region. There is some contrast identified in the M1 segment 

on the left crossflow from anterior communicating artery. The bilateral anterior

 cerebral arteries are patent. There is occlusion/thrombus of the left middle 

cerebral artery.





The right internal carotid arteries. The right middle cerebral artery is patent.





The bilateral vertebral arteries, basilar artery is patent. There is fetal 

origin of the right posterior cerebral artery. Moderate degenerative changes 

cervical spine.. Moderate bilateral lung emphysematous changes.





IMPRESSION:





Complete occlusion/thrombus of the left internal carotid artery at its origin 

with occlusion/no contrast or flow the left internal carotid artery throughout 

the neck and the intracranial region. There is some contrast identified in the 

M1 segment on the left crossflow from anterior communicating artery. There is 

occlusion/thrombus of the left middle cerebral artery.








**********FOR INTERNAL CODING PURPOSES**********





Critical result:





Findings discussed with  ER physician at 11/13/2021 1:40 PM.





RESULT CODE: (C)  





PROCEDURE: PORTABLE CHEST 1V





EXAMINATION: XR CHEST 1V





CLINICAL HISTORY: Altered level of consciousness





EXAM DATE/TIME: 11/13/2021 1:21 PM





COMPARISON: 8/8/2017








FINDINGS: 





Lines, Tubes, and Devices: None.





Cardiomediastinal Silhouette: Normal heart size. Aortic atherosclerotic 

calcification.





Lungs and Pleura: Nonspecific mild diffuse interstitial prominence, similar to 

prior study and likely chronic. No evidence of focal airspace consolidation or 

pleural effusion. 





Bones and Soft Tissues: Degenerative changes in the thoracic spine.


 





IMPRESSION:





No evidence of acute cardiopulmonary abnormality or significant interval change.





Electronically signed by: Adin Crocker DO (11/13/2021 1:49 PM) [id number]





Course & Med Decision Making:


Course & Med Decision Making


Pertinent Labs and Imaging studies reviewed. (See chart for details)





1329 radiologist called back states no head bleed does have a dense area in the 

left MCA we will proceed with a CTA at this time. 





1346 radiologist called back with CTA results that shows a left internal carotid

 artery occlusion, [facility] acute hospital was contacted spoke to 

Dr. Mims, he states to go ahead and start TPA here at [facility] and transfer the patient for further higher level of neurological care at

 the General acute hospital.  Spoke to patient's family son who is 

at the bedside and informed him that the patient will be transferred to the 

General acute hospital.





1420 current blood pressure is 142/92, heart rate 103.  Spoke to family with 

plan of care and they are agreeable





Dragon Disclaimer:


Dragon Disclaimer:


This electronic medical record was generated, in whole or in part, using a voice

 recognition dictation system.





Departure


Departure


Impression:  


   Primary Impression:  


   Acute ischemic cerebrovascular accident (CVA) involving internal carotid 

   artery territory


Disposition:  [facility]


Condition:  CRITICAL


Referrals:  


BOLA LEPE MD (PCP)











MAKEDA HIGGINS       Nov 13, 2021 13:50

## 2021-11-13 NOTE — RAD
Examination: CT angiography head and neck with IV contrast



COMPARISON:CT head same day exam



History: Cerebrovascular accident, code stroke



TECHNIQUE: Axial CT angiographic images of the head and neck were performed with IV contrast. Coronal
 and sagittal 3-D MIP reformats are performed. 3-D Volumetric reformats of the carotids and [place]
 [place] were performed.



Exposure: One or more of the following individualized dose reduction techniques were utilized for thi
s examination:  1. Automated exposure control  2. Adjustment of the mA and/or kV according to patient
 size  3. Use of iterative reconstruction technique





Stenosis calculations for CT, MR, and conventional angiography are based upon measurements of the dis
ole ICA diameter in accordance with the NASCET methodology. Stenosis calculations for carotid ultraso
und studies are derived from validated velocity criteria which are known to correlate with the NASCET
 methodology.



FINDINGS:



The origin of the great vessels from the arch of the aorta grossly appears unremarkable. The bilatera
l common carotid arteries are patent. There is complete occlusion/thrombus of the left internal carot
id artery at its origin is no contrast or flow the left internal carotid artery throughout the neck a
nd inthe intracranial region. There is some contrast identified in the M1 segment on the left crossfl
ow from anterior communicating artery. The bilateral anterior cerebral arteries are patent. There is 
occlusion/thrombus of the left middle cerebral artery.



The right internal carotid arteries. The right middle cerebral artery is patent.



The bilateral vertebral arteries, basilar artery is patent. There is fetal origin of the right poster
ior cerebral artery. Moderate degenerative changes cervical spine.. Moderate bilateral lung emphysema
tous changes.



IMPRESSION:



Complete occlusion/thrombus of the left internal carotid artery at its origin with occlusion/no contr
ast or flow the left internal carotid artery throughout the neck and the intracranial region. There i
s some contrast identified in the M1 segment on the left crossflow from anterior communicating artery
. There is occlusion/thrombus of the left middle cerebral artery.





**********FOR INTERNAL CODING PURPOSES**********



Critical result:



Findings discussed with  ER physician at 11/13/2021 1:40 PM.



RESULT CODE: (C)  



  







Electronically signed by: Jake Ray MD (11/13/2021 1:52 PM) [id number]

## 2021-11-13 NOTE — EKG
[facility]

              [address] [place] [address]

Test Date:    2021               Test Time:    13:42:47

Pat Name:     ANISA TYLER              Department:   

Patient ID:   [id number]           Room:          

Gender:       M                        Technician:   

:          [id number]               Requested By: MAKEDA HIGGINS

Order Number: [id number]           Reading MD:   Ishan Bell MD

                                 Measurements

Intervals                              Axis          

Rate:         91                       P:            -23

MA:           244                      QRS:          -72

QRSD:         122                      T:            113

QT:           408                                    

QTc:          504                                    

                           Interpretive Statements

ATRIAL FIBRILLATION

LAD

LAFB

NON-SPECIFIC ST/T CHANGES

Electronically Signed On 2021 13:49:23 CST by Ishan Bell MD